# Patient Record
Sex: FEMALE | Race: WHITE | NOT HISPANIC OR LATINO | Employment: FULL TIME | URBAN - METROPOLITAN AREA
[De-identification: names, ages, dates, MRNs, and addresses within clinical notes are randomized per-mention and may not be internally consistent; named-entity substitution may affect disease eponyms.]

---

## 2017-01-26 ENCOUNTER — GENERIC CONVERSION - ENCOUNTER (OUTPATIENT)
Dept: OTHER | Facility: OTHER | Age: 37
End: 2017-01-26

## 2017-03-08 ENCOUNTER — ALLSCRIPTS OFFICE VISIT (OUTPATIENT)
Dept: OTHER | Facility: OTHER | Age: 37
End: 2017-03-08

## 2017-03-16 ENCOUNTER — ALLSCRIPTS OFFICE VISIT (OUTPATIENT)
Dept: OTHER | Facility: OTHER | Age: 37
End: 2017-03-16

## 2017-03-16 LAB
BACTERIA UR QL AUTO: NEGATIVE
CLUE CELL (HISTORICAL): NEGATIVE
HYPHAL YEAST (HISTORICAL): NEGATIVE
KOH PREP (HISTORICAL): NEGATIVE
PH UR STRIP.AUTO: 5 [PH]
TRICHOMONAS (HISTORICAL): NEGATIVE
YEAST (HISTORICAL): NEGATIVE

## 2017-03-20 LAB
A. VAGINALIS BY PCR (HISTORICAL): NOT DETECTED
A.VAGINALIS LOG (CELL/ML) (HISTORICAL): <3.25
BVAB 2 (HISTORICAL): NOT DETECTED
C. ALBICANS, DNA OR PCR (HISTORICAL): NOT DETECTED
CANDIDA GENUS (HISTORICAL): NOT DETECTED
GARDNERELLA BY MOL. METHOD (HISTORICAL): <3.25
GARDNERELLA BY MOL. METHOD (HISTORICAL): NOT DETECTED
LACTOBACILLUS (SPECIES) (HISTORICAL): <3.25
LACTOBACILLUS (SPECIES) (HISTORICAL): NOT DETECTED
MEGASPHAERA TYPE 1 (HISTORICAL): NOT DETECTED
TRICHOMONAS (HISTORICAL): NOT DETECTED

## 2017-04-10 ENCOUNTER — OFFICE VISIT (OUTPATIENT)
Dept: URGENT CARE | Age: 37
End: 2017-04-10
Payer: COMMERCIAL

## 2017-04-10 ENCOUNTER — TRANSCRIBE ORDERS (OUTPATIENT)
Dept: URGENT CARE | Age: 37
End: 2017-04-10

## 2017-04-10 ENCOUNTER — APPOINTMENT (OUTPATIENT)
Dept: LAB | Age: 37
End: 2017-04-10
Attending: PHYSICIAN ASSISTANT
Payer: COMMERCIAL

## 2017-04-10 DIAGNOSIS — R35.0 FREQUENCY OF MICTURITION: ICD-10-CM

## 2017-04-10 PROCEDURE — G0383 LEV 4 HOSP TYPE B ED VISIT: HCPCS | Performed by: FAMILY MEDICINE

## 2017-04-10 PROCEDURE — S9083 URGENT CARE CENTER GLOBAL: HCPCS | Performed by: FAMILY MEDICINE

## 2017-04-10 PROCEDURE — 87086 URINE CULTURE/COLONY COUNT: CPT

## 2017-04-10 PROCEDURE — 81002 URINALYSIS NONAUTO W/O SCOPE: CPT | Performed by: FAMILY MEDICINE

## 2017-04-12 ENCOUNTER — GENERIC CONVERSION - ENCOUNTER (OUTPATIENT)
Dept: OTHER | Facility: OTHER | Age: 37
End: 2017-04-12

## 2017-04-12 LAB — BACTERIA UR CULT: NORMAL

## 2017-04-18 ENCOUNTER — ALLSCRIPTS OFFICE VISIT (OUTPATIENT)
Dept: OTHER | Facility: OTHER | Age: 37
End: 2017-04-18

## 2017-04-18 LAB
BILIRUB UR QL STRIP: NORMAL
CLARITY UR: NORMAL
COLOR UR: YELLOW
GLUCOSE (HISTORICAL): NORMAL
HGB UR QL STRIP.AUTO: NORMAL
KETONES UR STRIP-MCNC: NORMAL MG/DL
LEUKOCYTE ESTERASE UR QL STRIP: NORMAL
NITRITE UR QL STRIP: NORMAL
PH UR STRIP.AUTO: 6 [PH]
PROT UR STRIP-MCNC: NORMAL MG/DL
SP GR UR STRIP.AUTO: 1.02
UROBILINOGEN UR QL STRIP.AUTO: 3.5

## 2017-06-09 ENCOUNTER — ALLSCRIPTS OFFICE VISIT (OUTPATIENT)
Dept: OTHER | Facility: OTHER | Age: 37
End: 2017-06-09

## 2017-06-09 DIAGNOSIS — E78.5 HYPERLIPIDEMIA: ICD-10-CM

## 2017-07-03 ENCOUNTER — ALLSCRIPTS OFFICE VISIT (OUTPATIENT)
Dept: OTHER | Facility: OTHER | Age: 37
End: 2017-07-03

## 2017-07-20 ENCOUNTER — ALLSCRIPTS OFFICE VISIT (OUTPATIENT)
Dept: OTHER | Facility: OTHER | Age: 37
End: 2017-07-20

## 2017-12-12 ENCOUNTER — ALLSCRIPTS OFFICE VISIT (OUTPATIENT)
Dept: OTHER | Facility: OTHER | Age: 37
End: 2017-12-12

## 2017-12-13 NOTE — PROGRESS NOTES
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)  4  Pregnancy (V22 2) (Z34 90)  5  Viral upper respiratory tract infection with cough (465 9) (J06 9,B97 89)1    Assessment and plan 1 Hyperlipidemia controlled continue with current medical regiment recommend a low-cholesterol diet and recommend routine exercise we will continue to monitor the progress  Recommend a low-cholesterol diet  2  Anxiety improved patient is now off the SSRI and doing well she will monitor her symptoms if any worsening or change she will notify me immediately 3  Low-grade mucinous tumor status post excision next CT scan will be after pregnancy stepper of 2018 4  Pregnancy patient will see OBGYN this coming Thursday she will discuss with OBGYN recommendations of vitamin D  I will check the vitamin-D level 5  Viral upper respiratory tract infection improving fluids, rest, Ocean nasal spray call if his symptoms do not completely improved currently her symptoms are improving return to office 6 months call if any problems      1 Amended By: Elsa Guo; Dec 12 2017 10:19 PM EST    Plan  Health Maintenance, Hyperlipidemia    · (1) LIPID PANEL, FASTING; Status:Active; Requested for:58Hqt8198;   Hyperlipidemia    · *(Q) VITAMIN D, 25-HYDROXY, LC/MS/MS; Status:Active; Requested for:27Thg3446;     Chief Complaint  Chief Complaint Free Text Note Form: Patient presents to office today for a 6 month follow-up  History of Present Illness  HPI: 44-year-old female who is coming in for a follow-up examination anxiety, low-grade mucinous tumor, pregnancy and hyperlipidemia; The patient reports me compliant taking medications without untoward side effects the  The patient is here to review his medical condition, update me on the medical condition and the patient reports me no hospitalizations and no ER visits   she reports me she was exercising routinely at [address]  [employer] but needed to stop because of a cold  She is starting to get back into her routine exercise patterns she has recently found out that she is pregnant and will be seeing her OBGYN this Thursday  She is trying to follow healthy and balanced diet  She reports me she had stopped the SSRI several months ago because her anxiety had improved  She reports me that her anxiety has been doing well up to this point no suicidal ideation  She does report me because of the pregnancy she will need to delay the CT scan of the abdomen for follow-up of the low-grade mucinous tumor  Sept of 2018  She reports me that this is okay with her surgical oncologist Dr Tasia Sanchez  She also does report me mild cold symptoms nasal congestion and postnasal drip no fever, no chills no sinus pain which is improving on its own  She is not taking a cold medicines because of the pregnancy  The patient also reports to me cold symptoms nasal congestion postnasal drip and cough no fever no chills symptoms are improving over last several days  1        1 Amended By: Jalil Rae; Dec 12 2017 10:20 PM EST    Review of Systems  Complete-Female:  Constitutional:1  no fever1 -- and-- no chills1   ENT:1  nasal discharge1 , but-- no sore throat1   Respiratory:1  cough1 -- and-- improving1 , but-- no wheezing1   Psychiatric:1  not suicidal1 ,-- no anxiety1 -- and-- no depression1   Hematologic/Lymphatic:1  no swollen glands1         1 Amended By: Jalil Rea; Dec 12 2017 10:18 PM EST    Active Problems  1  Anxiety (300 00) (F41 9)  2  Back pain (724 5) (M54 9)  3  Benign colon polyp (211 3) (K63 5)  4  Bloating (787 3) (R14 0)  5  Chronic constipation (564 00) (K59 09)  6  Encounter for gynecological examination without abnormal finding (V72 31) (Z01 419)  7  Generalized anxiety disorder (300 02) (F41 1)  8  GERD without esophagitis (530 81) (K21 9)  9  Globus sensation (306 4) (F45 8)  10  Hyperlipidemia (272 4) (E78 5)  11   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)    Past Medical History    1  History of Abdominal left lower quadrant tenderness (789 64) (R10 814)  2  History of Antepartum hemorrhage, antepartum (641 93) (O46 90)  3  History of Anxiety (300 00) (F41 9)  4  History of Blocked tear duct (375 56)  5  History of Breast feeding status of mother (V24 1) (Z39 1)  6  History of Breast pain (611 71) (N64 4)  7  History of Breastfeeding (infant) (V49 89) (Z78 9)  8  History of Dysplasia of cervix, low grade (JOANNE 1) (622 11) (N87 0)  9  History of Encounter for routine gynecological examination (V72 31) (Z01 419)  10  History of Gastroesophageal reflux disease without esophagitis (530 81) (K21 9)  11  History of  2 (V22 2) (Z33 1)  12  History of acute mastitis (V13 89) (Z87 898)  13  History of acute otitis media (V12 49) (Z86 69)  14  History of acute sinusitis (V12 69) (Z87 09)  15  History of acute sinusitis (V12 69) (Z87 09)  16  History of acute sinusitis (V12 69) (Z87 09)  17  History of constipation (V12 79) (Z87 19)  18  History of gastroesophageal reflux (GERD) (V12 79) (Z87 19)  19  History of right bundle branch block (V15 1) (Z98 89)  20  History of right bundle branch block (RBBB) (V15 1) (Z98 890)  21  History of thyroid cyst (V12 29) (Z86 39)  22  History of upper respiratory infection (V12 09) (Z87 09)  23  History of vacuum extraction assisted delivery (V45 89) (Z98 890)  24  History of Irritable bowel syndrome (564 1) (K58 9)  25  History of Lactational amenorrhea (626 0) (N91 2)  26  History of Need for prophylactic vaccination against single diseases (V05 9) (Z23)  27  History of Palpitations (785 1) (R00 2)  28  History of Pap smear of cervix with ASCUS, cannot exclude HGSIL (795 02) (R87 611)  29  Personal history of respiratory system disease (V12 60) (Z87 09)  30  History of Preop examination (V72 84) (Z01 818)  31  History of Supervision of other normal pregnancy, second trimester  28   History of  (spontaneous vaginal delivery) (650) (O80)  33  Urinary tract infection (599 0) (N39 0)  Active Problems And Past Medical History Reviewed: The active problems and past medical history were reviewed and updated today  Surgical History  1  History of Appendectomy  2  History of Colposcopy Cervix With Biopsy(S)  3  History of Oral Surgery Tooth Extraction  4  History of Partial Colectomy  5  History of Surgery Of The Lacrimal System  Surgical History Reviewed: The surgical history was reviewed and updated today  Family History  Mother   1  FH: thyroid cancer (V16 8) (Z80 8)  Father   2  History of hyperlipidemia  Maternal Grandmother   3  Family history of Arthritis (V17 7)  4  Family history of Coronary Artery Disease (V17 49)  5  FH: thyroid cancer (V16 8) (Z80 8)  6  Family history of Transient Ischemic Attack  Paternal Grandfather   9  Family history of Colon cancer  Maternal Aunt   8  Family history of lung cancer (V16 1) (Z80 1)  9  Family history of Reported A Previous Heart Murmur  Paternal Aunt   8  Family history of lymphoma (V16 7) (Z80 2)  11  Family history of Genetic History  Family History Reviewed: The family history was reviewed and updated today  Social History   · Activities: Soccer   · Always uses seat belt   · Currently sexually active   · Drinks beer (V49 89) (Z78 9)   · Denied: History of Drinks coffee   · Exercise: Running   · Exercises moderately less than 3 times a week   · Marital History - Currently    · Never smoked tobacco (V49 89) (Z78 9)   · No drug use   · Oral contraceptives   · Social alcohol use (Z78 9)  Social History Reviewed: The social history was reviewed and updated today  The social history was reviewed and is unchanged  Current Meds  Medication List Reviewed: The medication list was reviewed and updated today  Allergies  1  Amoxicillin-Pot Clavulanate TABS  2  fluoxetine  3  Penicillins  4  No Known Food Allergies  5  Seasonal    Vitals  Vital Signs    Recorded: 99Weq2345 04:43PM   Heart Rate 94   Systolic 394   Diastolic 66   Height 5 ft 8 in   Weight 195 lb 12 8 oz   BMI Calculated 29 77   BSA Calculated 2 03       Physical Exam   Constitutional  General appearance: No acute distress, well appearing and well nourished  Eyes  Conjunctiva and lids: No swelling, erythema or discharge  Pupils and irises: Equal, round and reactive to light  Ears, Nose, Mouth, and Throat  External inspection of ears and nose: Normal    Otoscopic examination: Tympanic membranes translucent with normal light reflex  Canals patent without erythema  Nasal mucosa, septum, and turbinates: Normal without edema or erythema  There was clear rhinorrhea from both nares  Oropharynx: Normal with no erythema, edema, exudate or lesions  Pulmonary  Respiratory effort: No increased work of breathing or signs of respiratory distress  Auscultation of lungs: Clear to auscultation  Cardiovascular  Auscultation of heart: Normal rate and rhythm, normal S1 and S2, without murmurs  Abdomen  Abdomen: Non-tender, no masses  Psychiatric  Mood and affect: Normal   Mood and Affect: appropriate mood,-- not anxious-- and-- not depressed  Health Management  Benign colon polyp   COLONOSCOPY; every 4 years; Last 86GNW5094; Next Due: 11Kmf4272; Active  History of Screening for human papillomavirus (HPV)   (1) THIN PREP PAP WITH IMAGING; every 3 years; Last 46MMT4002; Next Due: 23Oab6313; Overdue    Future Appointments    Date/Time Provider Specialty Site   09/18/2018 01:00 PM Marilyn Hernandez DO Internal Medicine [facility]   07/24/2018 03:40 PM BETZY Grady  Obstetrics/Gynecology [facility]   12/14/2017 03:00 PM BETZY Garcia Do   Obstetrics/Gynecology [facility]       Signatures   Electronically signed by : Joey Mijares DO; Dec 12 2017  9:47PM EST                       (Author)    Electronically signed by : Sudarshan Hobbs DO; Dec 12 2017 10:20PM EST                       (Author)

## 2017-12-14 ENCOUNTER — GENERIC CONVERSION - ENCOUNTER (OUTPATIENT)
Dept: OTHER | Facility: OTHER | Age: 37
End: 2017-12-14

## 2017-12-29 ENCOUNTER — GENERIC CONVERSION - ENCOUNTER (OUTPATIENT)
Dept: OTHER | Facility: OTHER | Age: 37
End: 2017-12-29

## 2017-12-29 ENCOUNTER — ALLSCRIPTS OFFICE VISIT (OUTPATIENT)
Dept: OTHER | Facility: OTHER | Age: 37
End: 2017-12-29

## 2017-12-30 ENCOUNTER — GENERIC CONVERSION - ENCOUNTER (OUTPATIENT)
Dept: PERINATAL CARE | Facility: MEDICAL CENTER | Age: 37
End: 2017-12-30

## 2018-01-02 ENCOUNTER — TRANSCRIBE ORDERS (OUTPATIENT)
Dept: ADMINISTRATIVE | Age: 38
End: 2018-01-02

## 2018-01-02 ENCOUNTER — APPOINTMENT (OUTPATIENT)
Dept: LAB | Age: 38
End: 2018-01-02
Payer: COMMERCIAL

## 2018-01-02 DIAGNOSIS — Z34.81 ENCOUNTER FOR SUPERVISION OF OTHER NORMAL PREGNANCY, FIRST TRIMESTER: ICD-10-CM

## 2018-01-02 DIAGNOSIS — Z34.81 PRENATAL CARE, SUBSEQUENT PREGNANCY, FIRST TRIMESTER: Primary | ICD-10-CM

## 2018-01-02 DIAGNOSIS — E78.5 HYPERLIPIDEMIA, UNSPECIFIED HYPERLIPIDEMIA TYPE: ICD-10-CM

## 2018-01-02 DIAGNOSIS — Z34.81 PRENATAL CARE, SUBSEQUENT PREGNANCY, FIRST TRIMESTER: ICD-10-CM

## 2018-01-02 DIAGNOSIS — E78.5 HYPERLIPIDEMIA, UNSPECIFIED HYPERLIPIDEMIA TYPE: Primary | ICD-10-CM

## 2018-01-02 LAB
25(OH)D3 SERPL-MCNC: 12.9 NG/ML (ref 30–100)
ABO GROUP BLD: NORMAL
BACTERIA UR QL AUTO: ABNORMAL /HPF
BASOPHILS # BLD AUTO: 0.01 THOUSANDS/ΜL (ref 0–0.1)
BASOPHILS NFR BLD AUTO: 0 % (ref 0–1)
BILIRUB UR QL STRIP: NEGATIVE
BLD GP AB SCN SERPL QL: NEGATIVE
CLARITY UR: CLEAR
COLOR UR: YELLOW
EOSINOPHIL # BLD AUTO: 0.1 THOUSAND/ΜL (ref 0–0.61)
EOSINOPHIL NFR BLD AUTO: 2 % (ref 0–6)
ERYTHROCYTE [DISTWIDTH] IN BLOOD BY AUTOMATED COUNT: 13.5 % (ref 11.6–15.1)
GLUCOSE UR STRIP-MCNC: NEGATIVE MG/DL
HBV SURFACE AG SER QL: NORMAL
HCT VFR BLD AUTO: 38 % (ref 34.8–46.1)
HGB BLD-MCNC: 13.1 G/DL (ref 11.5–15.4)
HGB UR QL STRIP.AUTO: NEGATIVE
KETONES UR STRIP-MCNC: NEGATIVE MG/DL
LEUKOCYTE ESTERASE UR QL STRIP: NEGATIVE
LYMPHOCYTES # BLD AUTO: 1.36 THOUSANDS/ΜL (ref 0.6–4.47)
LYMPHOCYTES NFR BLD AUTO: 22 % (ref 14–44)
MCH RBC QN AUTO: 30.8 PG (ref 26.8–34.3)
MCHC RBC AUTO-ENTMCNC: 34.5 G/DL (ref 31.4–37.4)
MCV RBC AUTO: 89 FL (ref 82–98)
MONOCYTES # BLD AUTO: 0.3 THOUSAND/ΜL (ref 0.17–1.22)
MONOCYTES NFR BLD AUTO: 5 % (ref 4–12)
MUCOUS THREADS UR QL AUTO: ABNORMAL
NEUTROPHILS # BLD AUTO: 4.41 THOUSANDS/ΜL (ref 1.85–7.62)
NEUTS SEG NFR BLD AUTO: 71 % (ref 43–75)
NITRITE UR QL STRIP: NEGATIVE
NON-SQ EPI CELLS URNS QL MICRO: ABNORMAL /HPF
NRBC BLD AUTO-RTO: 0 /100 WBCS
PH UR STRIP.AUTO: 7 [PH] (ref 4.5–8)
PLATELET # BLD AUTO: 205 THOUSANDS/UL (ref 149–390)
PMV BLD AUTO: 11.4 FL (ref 8.9–12.7)
PROT UR STRIP-MCNC: ABNORMAL MG/DL
RBC # BLD AUTO: 4.26 MILLION/UL (ref 3.81–5.12)
RBC #/AREA URNS AUTO: ABNORMAL /HPF
RH BLD: POSITIVE
RUBV IGG SERPL IA-ACNC: >175 IU/ML
SP GR UR STRIP.AUTO: 1.02 (ref 1–1.03)
SPECIMEN EXPIRATION DATE: NORMAL
UROBILINOGEN UR QL STRIP.AUTO: 1 E.U./DL
WBC # BLD AUTO: 6.19 THOUSAND/UL (ref 4.31–10.16)
WBC #/AREA URNS AUTO: ABNORMAL /HPF

## 2018-01-02 PROCEDURE — 87086 URINE CULTURE/COLONY COUNT: CPT

## 2018-01-02 PROCEDURE — 80081 OBSTETRIC PANEL INC HIV TSTG: CPT

## 2018-01-02 PROCEDURE — 36415 COLL VENOUS BLD VENIPUNCTURE: CPT

## 2018-01-02 PROCEDURE — 82306 VITAMIN D 25 HYDROXY: CPT

## 2018-01-02 PROCEDURE — 81001 URINALYSIS AUTO W/SCOPE: CPT

## 2018-01-03 LAB
BACTERIA UR CULT: NORMAL
HIV 1+2 AB+HIV1 P24 AG SERPL QL IA: NORMAL
RPR SER QL: NORMAL

## 2018-01-08 ENCOUNTER — APPOINTMENT (OUTPATIENT)
Dept: PERINATAL CARE | Facility: CLINIC | Age: 38
End: 2018-01-08
Payer: COMMERCIAL

## 2018-01-08 ENCOUNTER — GENERIC CONVERSION - ENCOUNTER (OUTPATIENT)
Dept: OTHER | Facility: OTHER | Age: 38
End: 2018-01-08

## 2018-01-08 ENCOUNTER — ALLSCRIPTS OFFICE VISIT (OUTPATIENT)
Dept: PERINATAL CARE | Facility: CLINIC | Age: 38
End: 2018-01-08
Payer: COMMERCIAL

## 2018-01-08 PROCEDURE — 76801 OB US < 14 WKS SINGLE FETUS: CPT | Performed by: OBSTETRICS & GYNECOLOGY

## 2018-01-08 PROCEDURE — 76813 OB US NUCHAL MEAS 1 GEST: CPT | Performed by: OBSTETRICS & GYNECOLOGY

## 2018-01-08 PROCEDURE — 99202 OFFICE O/P NEW SF 15 MIN: CPT | Performed by: GENETIC COUNSELOR, MS

## 2018-01-10 NOTE — PSYCH
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 her and his is   Stressful nature of work and how different their schedules are contribute  Patient verbal and cooperative  HPI - Psych: Patient doing well  Not ruminating about physical status and very pleased with her treatment outcomes  Managing stress from work and home appropriately  Denies any depressive symptoms  Denies SI   Note   Note:   Focused on progress made and she agrees,  Reviewed stress mgmt strategies they both can utilize  No need for additional appts  Will contact me in future if needed  Results/Data  Results Danielleankit Rich Includes: Current Encounter]   [id number] 04:35PM   PHQ-2 Adult Depression Screening   PHQ-2 Adult Depression Score: 0   PHQ-2 Adult Depression Screening: Negative     Assessment    1   Anxiety (300 00) (F41 9)    Signatures   Electronically signed by : Nikita Rabago LCSW; Mar 24 2016  4:40PM EST                       (Author)

## 2018-01-10 NOTE — RESULT NOTES
Verified Results  (1) TSH 35HRT7117 01:20PM Brandon Lorenzo Order Number: [id number]    Patients undergoing fluorescein dye angiography may retain small amounts of fluorescein in the body for 48-72 hours post procedure  Samples containing fluorescein can produce falsely depressed TSH values  If the patient had this procedure,a specimen should be resubmitted post fluorescein clearance          The recommended reference ranges for TSH during pregnancy are as follows:  First trimester 0 1 to 2 5 uIU/mL  Second trimester  0 2 to 3 0 uIU/mL  Third trimester 0 3 to 3 0 uIU/m     Test Name Result Flag Reference   TSH 0 922 uIU/mL  0 358-3 740

## 2018-01-10 NOTE — RESULT NOTES
Discussion/Summary   No true UTI  Finish abx for sinusitis   F/u with gyn for further evaluation if symptoms persist       Verified Results  (1) URINE CULTURE 81Ouo0998 10:53PM Connor Ortiz    Order Number: [id number]     Test Name Result Flag Reference   CLINICAL REPORT (Report)     Test:        Urine culture  Specimen Type:   Urine  Specimen Date:   4/10/2017 10:53 PM  Result Date:    4/12/2017 11:17 AM  Result Status:   Final result  Resulting Lab:   [facility] [address] [place] [address]            Tel: [phone]      CULTURE                                       ------------------                                   10,000-19,000 cfu/ml Mixed Contaminants X3

## 2018-01-12 VITALS
BODY MASS INDEX: 29.25 KG/M2 | SYSTOLIC BLOOD PRESSURE: 118 MMHG | HEIGHT: 68 IN | WEIGHT: 193 LBS | DIASTOLIC BLOOD PRESSURE: 82 MMHG

## 2018-01-12 NOTE — PSYCH
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

## 2018-01-13 VITALS
BODY MASS INDEX: 29.86 KG/M2 | DIASTOLIC BLOOD PRESSURE: 82 MMHG | SYSTOLIC BLOOD PRESSURE: 120 MMHG | RESPIRATION RATE: 16 BRPM | HEIGHT: 68 IN | TEMPERATURE: 98.3 F | OXYGEN SATURATION: 99 % | HEART RATE: 84 BPM | WEIGHT: 197.03 LBS

## 2018-01-13 VITALS
HEIGHT: 68 IN | OXYGEN SATURATION: 99 % | DIASTOLIC BLOOD PRESSURE: 72 MMHG | SYSTOLIC BLOOD PRESSURE: 122 MMHG | BODY MASS INDEX: 29.26 KG/M2 | RESPIRATION RATE: 16 BRPM | WEIGHT: 193.05 LBS | TEMPERATURE: 99 F | HEART RATE: 87 BPM

## 2018-01-13 VITALS
BODY MASS INDEX: 29.08 KG/M2 | WEIGHT: 191.25 LBS | TEMPERATURE: 96 F | DIASTOLIC BLOOD PRESSURE: 74 MMHG | HEART RATE: 70 BPM | SYSTOLIC BLOOD PRESSURE: 116 MMHG

## 2018-01-14 VITALS
DIASTOLIC BLOOD PRESSURE: 80 MMHG | WEIGHT: 197 LBS | HEIGHT: 68 IN | BODY MASS INDEX: 29.86 KG/M2 | SYSTOLIC BLOOD PRESSURE: 130 MMHG

## 2018-01-14 NOTE — PSYCH
History of Present Illness  Psychotherapy Provided [facility]: Individual Psychotherapy 30 minutes minutes provided today  Goals addressed in session:   Patient experiencing some anxiety secondary to past medical issues  Some marital issues also at work  Patient verbal and cooperative  HPI - Psych: Patient denies any depressive symptoms  Denies SI  Has hx of some anxiety and worry and has been in Counseling previously  Some issues managing worry  Marital issues also contributing  Has good support system via family, coworkers and friends  Has two small children and may be planning third in future   Note   Note:   Began utilizing some CBT strategies to counteract worry  Offered sessions with  as well  Will meet with he again in two weeks and will assess  Assessment    1   Anxiety (300 00) (F41 9)    Signatures   Electronically signed by : Nitish Tavarez LCSW; Feb 18 2016  7:55PM EST                       (Author)

## 2018-01-15 NOTE — PROGRESS NOTES
Assessment    1  Vaginal discomfort (625 9) (N94 9)    Plan  Urinary frequency    · Urine Dip Automated- POC; Status:Complete;   Done: 34FHU6006 09:24AM    Discussion/Summary    REPEAT UA NEG FOR BLOOD OR INFECTION  DRINK PLENTY OF FLUIDS AND REST  CALL IF WORSENING  MAY NEED PELVIC ULTRASOUND  Chief Complaint  The patient has tightness and spasms in her vaginal area  History of Present Illness  HPI: starting in march pt felt a "tickle" in the vaginal area  saw gyn march 16, cultures were negative  ua showed blood in the urine  also went to urgent care for sinus infection  prescribed levaquin  doing kegel exercises does help  pt had a ct of abdomen in december which was normal      Review of Systems    Constitutional: no fever and no chills  ENT: no earache and no hearing loss  Respiratory: no shortness of breath and no wheezing  Gastrointestinal: no abdominal pain, no nausea, no constipation and no diarrhea  Genitourinary: vaginal discomfort, but as noted in HPI, no dysuria, no pelvic pain, no incontinence and no dysmenorrhea  Musculoskeletal: no arthralgias  Integumentary: no rashes  Neurological: no headache, no numbness and no dizziness  ROS reviewed  Active Problems    1  Abdominal left lower quadrant tenderness (789 64) (R10 814)   2  Acute conjunctivitis (372 00) (H10 30)   3  Acute otitis media (382 9) (H66 90)   4  Acute sinusitis (461 9) (J01 90)   5  Anxiety (300 00) (F41 9)   6  Back pain (724 5) (M54 9)   7  Benign colon polyp (211 3) (K63 5)   8  Bloating (787 3) (R14 0)   9  Chronic constipation (564 00) (K59 09)   10  Contraception (V25 9) (Z30 9)   11  Encounter for gynecological examination without abnormal finding (V72 31) (Z01 419)   12  Encounter for screening for human papillomavirus (HPV) (V73 81) (Z11 51)   13  Generalized anxiety disorder (300 02) (F41 1)   14  GERD without esophagitis (530 81) (K21 9)   15  Globus sensation (306 4) (F45 8)   16  Hyperlipidemia (272 4) (E78 5)   17  Laboratory examination ordered as part of a routine general medical examination    (V72 62) (Z00 00)   18  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)   19  Nausea (787 02) (R11 0)   20  Need for prophylactic vaccination and inoculation against influenza (V04 81) (Z23)   21  Sorethroat (462) (J02 9)   22  Urinary frequency (788 41) (R35 0)   23  Vaginal discharge (623 5) (N89 8)   24  Vaginal itching (698 1) (L29 8)    Past Medical History  Active Problems And Past Medical History Reviewed: The active problems and past medical history were reviewed and updated today  Surgical History  Surgical History Reviewed: The surgical history was reviewed and updated today  Social History    · Activities: Soccer   · Always uses seat belt   · Currently sexually active   · Drinks beer (V49 89) (Z78 9)   · Denied: History of Drinks coffee   · Exercise: Running   · Exercises moderately less than 3 times a week   · Marital History - Currently    · Never smoked tobacco (V49 89) (Z78 9)   · No drug use   · Oral contraceptives   · Social alcohol use (Z78 9)  The social history was reviewed and updated today  The social history was reviewed and is unchanged  Family History  Family History Reviewed: The family history was reviewed and updated today  Current Meds   1  Apri 0 15-30 MG-MCG Oral Tablet; TAKE 1 TABLET DAILY; Therapy: 63INX4495 to (Evaluate:13Jun2017)  Requested for: 36ZHH4442; Last   Rx:02Cic2103 Ordered   2  LevoFLOXacin 500 MG Oral Tablet; TAKE 1 TABLET DAILY AS DIRECTED; Therapy: 32Kai0315 to (Evaluate:14Mzt3100)  Requested for: 02Gal2864; Last   Rx:77Vmr0019 Ordered   3  Sertraline HCl - 25 MG Oral Tablet; TAKE 1 TABLET DAILY; Therapy: 01SHQ6946 to (Evaluate:99Ysh9353)  Requested for: 20Ang3026; Last   Rx:21Iqp2144 Ordered    The medication list was reviewed and updated today  Allergies    1   Amoxicillin-Pot Clavulanate TABS 2  fluoxetine   3  Penicillins    4  No Known Food Allergies   5  Seasonal    Vitals   Recorded: 18Apr2017 08:09AM   Temperature 97 6 F   Heart Rate 68   Respiration 20   Systolic 514, LUE, Sitting   Diastolic 82, LUE, Sitting   BP CUFF SIZE Large   Height 5 ft 8 in   Weight 195 lb 0 2 oz   BMI Calculated 29 65   BSA Calculated 2 02     Physical Exam    Constitutional   General appearance: No acute distress, well appearing and well nourished  Eyes   Conjunctiva and lids: No swelling, erythema or discharge  Pupils and irises: Equal, round and reactive to light  Ears, Nose, Mouth, and Throat   External inspection of ears and nose: Normal     Otoscopic examination: Tympanic membranes translucent with normal light reflex  Canals patent without erythema  Nasal mucosa, septum, and turbinates: Normal without edema or erythema  Oropharynx: Normal with no erythema, edema, exudate or lesions  Pulmonary   Respiratory effort: No increased work of breathing or signs of respiratory distress  Auscultation of lungs: Clear to auscultation  Cardiovascular   Auscultation of heart: Normal rate and rhythm, normal S1 and S2, without murmurs  Examination of extremities for edema and/or varicosities: Normal     Abdomen   Abdomen: Non-tender, no masses  Liver and spleen: No hepatomegaly or splenomegaly  Lymphatic   Palpation of lymph nodes in neck: No lymphadenopathy  Musculoskeletal   Gait and station: Normal     Digits and nails: Normal without clubbing or cyanosis  Inspection/palpation of joints, bones, and muscles: Normal     Skin   Skin and subcutaneous tissue: Normal without rashes or lesions      Psychiatric   Orientation to person, place, and time: Normal     Mood and affect: Normal          Results/Data  Urine Dip Automated- POC 18Apr2017 09:24AM Nick Alcazar     Test Name Result Flag Reference   Color Yellow     Clarity Transparent     Leukocytes neg     Nitrite neg     Blood neg     Bilirubin neg     Urobilinogen 3 5     Protein neg     Ph 6 0     Specific Gravity 1 020     Ketone neg     Glucose neg         Future Appointments    Date/Time Provider Specialty Site   12/28/2017 08:00 AM BETZY Norman  Surgical [facility]cology [facility]   06/28/2017 09:00 AM BETZY Aggarwal  Obstetrics/Gynecology [facility]   06/09/2017 09:30 AM Jmiy Chowdhury DO Internal Medicine [facility]     Signatures   Electronically signed by : LACI Donnelly;  Apr 18 2017 12:02PM EST                       (Author)

## 2018-01-16 NOTE — MISCELLANEOUS
Message   Recorded as Task   Date: 06/27/2016 11:03 AM, Created By: Kelvin Spurling   Task Name: Call Back   Assigned To: Zeferino Castano   Regarding Patient: Lisa Boggs, Status: In Progress   Comment:    Deanna Arredondo - 27 Jun 2016 11:03 AM     TASK CREATED  Pt need a refill on her Apri to be sent to [facility] on [facility]  Her # [address] - 27 Jun 2016 11:07 AM     TASK IN PROGRESS   Iva Garcia - 27 Jun 2016 11:13 AM     TASK EDITED  rx refil sent to [facility]         Active Problems    1  Abdominal left lower quadrant tenderness (789 64) (R10 814)   2  Acute conjunctivitis (372 00) (H10 30)   3  Acute otitis media (382 9) (H66 90)   4  Acute sinusitis (461 9) (J01 90)   5  Anxiety (300 00) (F41 9)   6  Back pain (724 5) (M54 9)   7  Bloating (787 3) (R14 0)   8  Chronic constipation (564 00) (K59 09)   9  Contraception (V25 9) (Z30 9)   10  Encounter for gynecological examination without abnormal finding (V72 31) (Z01 419)   11  Encounter for screening for human papillomavirus (HPV) (V73 81) (Z11 51)   12  Generalized anxiety disorder (300 02) (F41 1)   13  GERD without esophagitis (530 81) (K21 9)   14  Globus sensation (306 4) (F45 8)   15  Hyperlipidemia (272 4) (E78 5)   16  Laboratory examination ordered as part of a routine general medical examination    (V72 62) (Z00 00)   17  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)   18  Nausea (787 02) (R11 0)   19  Sorethroat (462) (J02 9)    Current Meds   1  Apri 0 15-30 MG-MCG Oral Tablet; TAKE 1 TABLET DAILY; Therapy: 55GVM5156 to (Evaluate:28Jun2016)  Requested for: 10XXN3579; Last   Rx:31May2016 Ordered   2  Colace 100 MG Oral Capsule; TAKE 1 CAPSULE DAILY; Therapy: 37BDC8345 to (Evaluate:17Mar2017)  Requested for: 32UIV4550; Last   Rx:22Mar2016 Ordered   3  Fluticasone Propionate 50 MCG/ACT Nasal Suspension (Flonase); USE 2 SPRAYS IN   EACH NOSTRIL ONCE DAILY;    Therapy: 42IHN2180 to (Hiren Knight)  Requested for: 39UOE9433; Last Rx:92Svx6247 Ordered   4  Polyethylene Glycol 3350 Oral Packet (MiraLax); MIX 1 PACKET IN 8 OUNCES OF   LIQUID AND DRINK ONCE DAILY; Therapy: 17AYK1402 to (Evaluate:17Mar2017)  Requested for: 08WVL5730; Last   Rx:22Mar2016 Ordered   5  Prenatal 28-0 8 MG Oral Tablet; TAKE 1 TABLET DAILY; Therapy: (Recorded:85Lps4786) to Recorded   6  Probiotic CAPS; Therapy: (Recorded:40Pbj0716) to Recorded   7  Sertraline HCl - 25 MG Oral Tablet (Zoloft); TAKE 1 TABLET DAILY; Therapy: 42URP4976 to ([phone])  Requested for: 50FZA0076; Last   VQ:88VRQ6295; Status: ACTIVE - Renewal Denied Ordered    Allergies    1  Amoxicillin-Pot Clavulanate TABS   2  fluoxetine   3  Penicillins    4  No Known Food Allergies   5   Seasonal    Plan  PMH: History of constipation    · Apri 0 15-30 MG-MCG Oral Tablet; TAKE 1 TABLET DAILY    Signatures   Electronically signed by : Cali Chi LPN; Jun 27 9137 87:77JY EST                       (Author)

## 2018-01-16 NOTE — MISCELLANEOUS
Message   Recorded as Task   Date: 07/12/2016 03:32 PM, Created By: Denae Monroy   Task Name: Med Renewal Request   Assigned To: Gerson Salazar   Regarding Patient: Aldine Dakin, Status: Active   Comment:    Ileana Zamora - 12 Jul 2016 3:32 PM     TASK CREATED  Caller: Self; Renew Medication; [phone] (Mobile Phone)  needs a year's refills to [facility] on [address]-had 1 pk called last month but not the year's refills  pt is @[phone]  Avila Agarwal - 12 Jul 2016 3:33 PM     TASK REASSIGNED: Previously Assigned To Josefa Pacheco - 12 Jul 2016 3:35 PM     TASK EDITED  resent to pharm w/ refills        Active Problems    1  Abdominal left lower quadrant tenderness (789 64) (R10 814)   2  Acute conjunctivitis (372 00) (H10 30)   3  Acute otitis media (382 9) (H66 90)   4  Acute sinusitis (461 9) (J01 90)   5  Anxiety (300 00) (F41 9)   6  Back pain (724 5) (M54 9)   7  Bloating (787 3) (R14 0)   8  Chronic constipation (564 00) (K59 09)   9  Contraception (V25 9) (Z30 9)   10  Encounter for gynecological examination without abnormal finding (V72 31) (Z01 419)   11  Encounter for screening for human papillomavirus (HPV) (V73 81) (Z11 51)   12  Generalized anxiety disorder (300 02) (F41 1)   13  GERD without esophagitis (530 81) (K21 9)   14  Globus sensation (306 4) (F45 8)   15  Hyperlipidemia (272 4) (E78 5)   16  Laboratory examination ordered as part of a routine general medical examination    (V72 62) (Z00 00)   17  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)   18  Nausea (787 02) (R11 0)   19  Sorethroat (462) (J02 9)    Current Meds   1  Apri 0 15-30 MG-MCG Oral Tablet; TAKE 1 TABLET DAILY; Therapy: 78YRL7807 to (Cam Squibb)  Requested for: 30AMB2464; Last   Rx:27Jun2016 Ordered   2  Colace 100 MG Oral Capsule; TAKE 1 CAPSULE DAILY; Therapy: 50BAB7563 to (Evaluate:17Mar2017)  Requested for: 10CLH8770; Last   Rx:22Mar2016 Ordered   3   Fluticasone Propionate 50 MCG/ACT Nasal Suspension (Flonase); USE 2 SPRAYS IN   EACH NOSTRIL ONCE DAILY; Therapy: 87PAC5086 to (Chanda Radford)  Requested for: 68IBC3014; Last   Rx:98Zwe3644 Ordered   4  Polyethylene Glycol 3350 Oral Packet (MiraLax); MIX 1 PACKET IN 8 OUNCES OF   LIQUID AND DRINK ONCE DAILY; Therapy: 04PAI4445 to (Evaluate:17Mar2017)  Requested for: 32NSK0501; Last   Rx:22Mar2016 Ordered   5  Prenatal 28-0 8 MG Oral Tablet; TAKE 1 TABLET DAILY; Therapy: (Recorded:95Sdt2522) to Recorded   6  Probiotic CAPS; Therapy: (Recorded:94Zwn9676) to Recorded   7  Sertraline HCl - 25 MG Oral Tablet (Zoloft); TAKE 1 TABLET DAILY; Therapy: 71YQY3781 to (Swapnil Govea)  Requested for: 08PKD1994; Last   NJ:53UKH3296; Status: ACTIVE - Renewal Denied Ordered    Allergies    1  Amoxicillin-Pot Clavulanate TABS   2  fluoxetine   3  Penicillins    4  No Known Food Allergies   5   Seasonal    Plan  PMH: History of constipation    · Apri 0 15-30 MG-MCG Oral Tablet; TAKE 1 TABLET DAILY    Signatures   Electronically signed by : Froilan Cheung, ; Jul 12 2016  3:35PM EST                       (Author)

## 2018-01-16 NOTE — PROGRESS NOTES
Assessment    1  Encounter for preventive health examination (V70 0) (Z00 00)   2  Hyperlipidemia (272 4) (E78 5)    Plan  Generalized anxiety disorder    · Sertraline HCl - 25 MG Oral Tablet (Zoloft); TAKE 1 TABLET DAILY  Health Maintenance    · (1) COMPREHENSIVE METABOLIC PANEL; Status:Active; Requested for:30Nov2017; Health Maintenance, Hyperlipidemia    · (1) LIPID PANEL, FASTING; Status:Active; Requested for:30Nov2017;   Hyperlipidemia    · Fexofenadine HCl - 180 MG Oral Tablet (Allegra Allergy); TAKE 1 TABLET DAILY  AS NEEDED FOR ALLERGIES   · Mometasone Furoate 50 MCG/ACT Nasal Suspension (Nasonex); TWO (2)  SPRAY(S) INTO EACH NOSTRIL DAILYAT BEDTIME AS NEEDED    Assessment and plan #1 annual wellness examination completed for patient today overall the patient is clinically stable and doing well regurgitation following healthy balanced diet, please refer to patient follow a low-cholesterol diet at this point in time she does not meet the criteria for statin and she prefers not to be on a statin reports to me that she may be considering pregnancy in the future  She will notify me 3 months prior to becoming pregnant to try to wean her off the SSRI  She is up-to-date with her OB/GYN checkup I will be wearing the patient comprehensive metabolic panel, lipid panel, CBC and third generation TSH  RTO in 6 months, any problems  History of Present Illness  , Adult Female: The patient is being seen for a health maintenance evaluation  The last health maintenance visit was 1 year(s) ago  Social History: Household members include spouse and 2 daughter(s)  She is   Work status: working full time and occupation: teacher 2 nd grade   The patient has never smoked cigarettes  She reports occasional alcohol use and drinking 1 drinks per month  She has never used illicit drugs  General Health: The patient's health since the last visit is described as good  She has regular dental visits   The patient brushes 2 time(s) a day, flosses occasionally and reports her last dental visit was 7/2016  She complains of vision problems  Vision care includes wearing glasses, wearing soft contact lenses and 2016  She denies hearing loss  Immunizations status: up to date  Lifestyle:  She consumes a diverse and healthy diet  Dietary details include 2-3 servings of fruit per day, 2-3 servings of vegetables per day, 1 servings of meat per day, 2-3 servings of whole grains per day, 64 ounces of water per day and 2-3 servings of dairy foods per day  She does not have any weight concerns  She exercises regularly  She exercises 3 or more times per week for 30-2 5 hrs minutes per session  Exercise includes running/jogging and soccer  She does not use tobacco  The patient has never smoked cigarettes, has never used smokeless tobacco, has never smoked a pipe and has never smoked cigars  She consumes alcohol  She reports occasional alcohol use and drinking 1 every 4 weeks drinks per week  She typically drinks beer and hard liquor, but no wine consumption  Alcohol concern: The patient has no concerns about alcohol abuse  She denies drug use  She has never used illicit drugs  Reproductive health:  she reports normal menses  she is sexually active  pregnancy history: G 2P 2  Screening: Cervical cancer screening includes a pap smear performed last year  Colorectal cancer screening includes a colonoscopy performed within the past ten years and Q 5 yrs  Safety elements used: seat belt, safe driving habits, sunscreen, smoke detector, carbon monoxide detector, gun safe, CPR training for the patient and CPR training for household members  Risk findings: anxiety symptoms and no si, but no passive smoke exposure, no depression symptoms, no travel to developing areas and no tuberculosis exposure  Active Problems    1  Abdominal left lower quadrant tenderness (789 64) (R10 814)   2  Acute conjunctivitis (372 00) (H10 30)   3   Acute otitis media (382 9) (H66 90)   4  Acute sinusitis (461 9) (J01 90)   5  Anxiety (300 00) (F41 9)   6  Back pain (724 5) (M54 9)   7  Bloating (787 3) (R14 0)   8  Chronic constipation (564 00) (K59 09)   9  Contraception (V25 9) (Z30 9)   10  Encounter for gynecological examination without abnormal finding (V72 31) (Z01 419)   11  Encounter for screening for human papillomavirus (HPV) (V73 81) (Z11 51)   12  Generalized anxiety disorder (300 02) (F41 1)   13  GERD without esophagitis (530 81) (K21 9)   14  Globus sensation (306 4) (F45 8)   15  Hyperlipidemia (272 4) (E78 5)   16  Laboratory examination ordered as part of a routine general medical examination    (V72 62) (Z00 00)   17  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)   18  Nausea (787 02) (R11 0)   19  Need for prophylactic vaccination and inoculation against influenza (V04 81) (Z23)   20   Sorethroat (462) (J02 9)    Past Medical History    · History of Antepartum hemorrhage, antepartum (641 93) (O46 90)   · History of Anxiety (300 00) (F41 9)   · History of Blocked tear duct (375 56)   · History of Breast feeding status of mother (V24 1) (Z39 1)   · History of Breast pain (611 71) (N64 4)   · History of Breastfeeding (infant) (V49 89) (Z78 9)   · History of Dysplasia of cervix, low grade (JOANNE 1) (622 11) (N87 0)   · History of Encounter for routine gynecological examination (V72 31) (Z01 419)   · History of Gastroesophageal reflux disease without esophagitis (530 81) (K21 9)   · History of  2 (V22 2) (Z33 1)   · History of acute mastitis (V13 89) (E62 895)   · History of acute sinusitis (V12 69) (Z87 09)   · History of acute sinusitis (V12 69) (Z87 09)   · History of constipation (V12 79) (Z87 19)   · History of gastroesophageal reflux (GERD) (V12 79) (Z87 19)   · History of right bundle branch block (V15 1) (Z98 89)   · History of right bundle branch block (RBBB) (V15 1) (Z98 890)   · History of thyroid cyst (V12 29) (Z86 39)   · History of upper respiratory infection (V12 09) (Z87 09)   · History of vacuum extraction assisted delivery (V45 89) (S39 097)   · History of Irritable bowel syndrome (564 1) (K58 9)   · History of Lactational amenorrhea (626 0) (N91 2)   · History of Need for prophylactic vaccination against single diseases (V05 9) (Z23)   · History of Palpitations (785 1) (R00 2)   · History of Pap smear of cervix with ASCUS, cannot exclude HGSIL (795 02) (R87 611)   · Personal history of respiratory system disease (V12 60) (Z87 09)   · History of Preop examination (V72 84) (Z01 818)   · History of Supervision of other normal pregnancy, second trimester   · History of  (spontaneous vaginal delivery) (650) (O80)    Surgical History    · History of Appendectomy   · History of Colposcopy Cervix With Biopsy(S)   · History of Oral Surgery Tooth Extraction   · History of Partial Colectomy   · History of Surgery Of The Lacrimal System    Family History  Mother    · FH: thyroid cancer (V16 8) (Z80 8)  Father    · History of hyperlipidemia  Maternal Grandmother    · Family history of Arthritis (V17 7)   · Family history of Coronary Artery Disease (V17 49)   · FH: thyroid cancer (V16 8) (Z80 8)   · Family history of Transient Ischemic Attack  Paternal Grandfather    · Family history of Colon cancer  Maternal Aunt    · Family history of lung cancer (V16 1) (Z80 1)   · Family history of Reported A Previous Heart Murmur  Paternal Aunt    · Family history of lymphoma (V16 7) (Z80 2)   · Family history of Genetic History    Social History    · Activities: Soccer   · Always uses seat belt   · Currently sexually active   · Drinks beer (V49 89) (Z78 9)   · Denied: History of Drinks coffee   · Exercise: Running   · Exercises moderately less than 3 times a week   · Marital History - Currently    · Never smoked tobacco (V49 89) (Z78 9)   · No drug use   · Oral contraceptives   · Social alcohol use (Z78 9)    Current Meds   1   Apri 0 15-30 MG-MCG Oral Tablet; TAKE 1 TABLET DAILY; Therapy: 35MNB0424 to (Evaluate:13Jun2017)  Requested for: 24DXL1515; Last   Rx:93Pjf6717 Ordered   2  Colace 100 MG Oral Capsule; TAKE 1 CAPSULE DAILY, as needed; Therapy: 05IXF0072 to (Анна Monroy)  Requested for: 52FAM7576 Recorded   3  Polyethylene Glycol 3350 Oral Packet; MIX 1 PACKET IN 8 OUNCES OF LIQUID AND   DRINK ONCE DAILY; Therapy: 56AJN4462 to (Evaluate:17Mar2017)  Requested for: 23GTR4004; Last   Rx:22Mar2016 Ordered   4  Prenatal 28-0 8 MG Oral Tablet; TAKE 1 TABLET DAILY; Therapy: (Recorded:64Wyx1018) to Recorded   5  Sertraline HCl - 25 MG Oral Tablet; TAKE 1 TABLET DAILY; Therapy: 35OGL0548 to (Evaluate:06Opa1055)  Requested for: 79Vgd1489; Last   Rx:43Hxi2904 Ordered    Allergies    1  Amoxicillin-Pot Clavulanate TABS   2  fluoxetine   3  Penicillins    4  No Known Food Allergies   5  Seasonal    Vitals   Recorded: 73WZI8143 04:58PM   Heart Rate 76   Respiration 16   Systolic 440, RUE, Sitting   Diastolic 76, RUE, Sitting   BP CUFF SIZE Large   Height 5 ft 8 in   Weight 188 lb 0 6 oz   BMI Calculated 28 59   BSA Calculated 1 99     Physical Exam    Constitutional   General appearance: No acute distress, well appearing and well nourished  Head and Face   Head and face: Normal     Eyes   Conjunctiva and lids: No swelling, erythema or discharge  Pupils and irises: Equal, round, reactive to light  Ears, Nose, Mouth, and Throat   External inspection of ears and nose: Normal     Otoscopic examination: Tympanic membranes translucent with normal light reflex  Canals patent without erythema  Hearing: Normal     Nasal mucosa, septum, and turbinates: Normal without edema or erythema  Lips, teeth, and gums: Normal, good dentition  Oropharynx: Normal with no erythema, edema, exudate or lesions  Neck   Neck: Supple, symmetric, trachea midline, no masses      Pulmonary   Respiratory effort: No increased work of breathing or signs of respiratory distress  Auscultation of lungs: Clear to auscultation  Cardiovascular   Auscultation of heart: Normal rate and rhythm, normal S1 and S2, no murmurs  Pedal pulses: 2+ bilaterally  Examination of extremities for edema and/or varicosities: Normal     Abdomen   Abdomen: Non-tender, no masses  Psychiatric   Mood and affect: Normal        Results/Data  (1) CBC/PLT/DIFF 24HOF1577 10:49AM [facility]     Test Name Result Flag Reference   WBC COUNT 5 13 Thousand/uL  4 31-10 16   RBC COUNT 4 81 Million/uL  3 81-5 12   HEMOGLOBIN 14 2 g/dL  11 5-15 4   HEMATOCRIT 42 7 %  34 8-46  1   MCV 89 fL  82-98   MCH 29 5 pg  26 8-34 3   MCHC 33 3 g/dL  31 4-37 4   RDW 12 7 %  11 6-15 1   MPV 10 9 fL  8 9-12 7   PLATELET COUNT 777 Thousands/uL  149-390   nRBC AUTOMATED 0 /100 WBCs     NEUTROPHILS RELATIVE PERCENT 51 %  43-75   LYMPHOCYTES RELATIVE PERCENT 42 %  14-44   MONOCYTES RELATIVE PERCENT 5 %  4-12   EOSINOPHILS RELATIVE PERCENT 2 %  0-6   BASOPHILS RELATIVE PERCENT 0 %  0-1   NEUTROPHILS ABSOLUTE COUNT 2 60 Thousands/?L  1 85-7 62   LYMPHOCYTES ABSOLUTE COUNT 2 16 Thousands/?L  0 60-4 47   MONOCYTES ABSOLUTE COUNT 0 25 Thousand/?L  0 17-1 22   EOSINOPHILS ABSOLUTE COUNT 0 10 Thousand/?L  0 00-0 61   BASOPHILS ABSOLUTE COUNT 0 01 Thousands/?L  0 00-0 10     (1) COMPREHENSIVE METABOLIC PANEL [id number] 43:38NS [facility]     Test Name Result Flag Reference   GLUCOSE,RANDM 81 mg/dL     If the patient is fasting, the ADA then defines impaired fasting glucose as > 100 mg/dL and diabetes as > or equal to 123 mg/dL     SODIUM 139 mmol/L  136-145   POTASSIUM 4 0 mmol/L  3 5-5 3   CHLORIDE 105 mmol/L  100-108   CARBON DIOXIDE 28 mmol/L  21-32   ANION GAP (CALC) 6 mmol/L  4-13   BLOOD UREA NITROGEN 13 mg/dL  5-25   CREATININE 0 67 mg/dL  0 60-1 30   Standardized to IDMS reference method   CALCIUM 8 7 mg/dL  8 3-10 1   BILI, TOTAL 0 71 mg/dL  0 20-1 00   ALK PHOSPHATAS 72 U/L     ALT (SGPT) 26 U/L  12-78   AST(SGOT) 15 U/L  5-45   ALBUMIN 3 5 g/dL  3 5-5 0   TOTAL PROTEIN 7 0 g/dL  6 4-8 2   eGFR Non-African American      >60 0 ml/min/1 73sq m   [facility] Disease Education Program recommendations are as follows:  GFR calculation is accurate only with a steady state creatinine  Chronic Kidney disease less than 60 ml/min/1 73 sq  meters  Kidney failure less than 15 ml/min/1 73 sq  meters  (1) LIPID PANEL FASTING W DIRECT LDL REFLEX [id number] 10:49AM [facility]     Test Name Result Flag Reference   CHOLESTEROL 257 mg/dL H    LDL CHOLESTEROL CALCULATED 160 mg/dL H 0-100   Triglyceride:         Normal              <150 mg/dl       Borderline High    150-199 mg/dl       High               200-499 mg/dl       Very High          >499 mg/dl  Cholesterol:         Desirable        <200 mg/dl      Borderline High  200-239 mg/dl      High             >239 mg/dl  HDL Cholesterol:        High    >59 mg/dL      Low     <41 mg/dL  LDL Cholesterol:        Optimal          <100 mg/dl        Near Optimal     100-129 mg/dl        Above Optimal          Borderline High   130-159 mg/dl          High              160-189 mg/dl          Very High        >189 mg/dl  LDL CALCULATED:    This screening LDL is a calculated result  It does not have the accuracy of the Direct Measured LDL in the monitoring of patients with hyperlipidemia and/or statin therapy  Direct Measure LDL (FJK507) must be ordered separately in these patients  TRIGLYCERIDES 147 mg/dL  <=150   Specimen collection should occur prior to N-Acetylcysteine or Metamizole administration due to the potential for falsely depressed results  HDL,DIRECT 68 mg/dL H 40-60   Specimen collection should occur prior to Metamizole administration due to the potential for falsely depressed results       (1) TSH WITH FT4 REFLEX [id number] 10:49AM [facility]     Test Name Result Flag Reference   TSH 0 710 uIU/mL  0 358-3 740   Patients undergoing fluorescein dye angiography may retain small amounts of fluorescein in the body for 48-72 hours post procedure  Samples containing fluorescein can produce falsely depressed TSH values  If the patient had this procedure,a specimen should be resubmitted post fluorescein clearance  The recommended reference ranges for TSH during pregnancy are as follows:  First trimester 0 1 to 2 5 uIU/mL  Second trimester  0 2 to 3 0 uIU/mL  Third trimester 0 3 to 3 0 uIU/m     (1) CBC/PLT/DIFF [id number] 10:49AM [facility]     Test Name Result Flag Reference   WBC COUNT 5 13 Thousand/uL  4 31-10 16   RBC COUNT 4 81 Million/uL  3 81-5 12   HEMOGLOBIN 14 2 g/dL  11 5-15 4   HEMATOCRIT 42 7 %  34 8-46  1   MCV 89 fL  82-98   MCH 29 5 pg  26 8-34 3   MCHC 33 3 g/dL  31 4-37 4   RDW 12 7 %  11 6-15 1   MPV 10 9 fL  8 9-12 7   PLATELET COUNT 295 Thousands/uL  149-390   nRBC AUTOMATED 0 /100 WBCs     NEUTROPHILS RELATIVE PERCENT 51 %  43-75   LYMPHOCYTES RELATIVE PERCENT 42 %  14-44   MONOCYTES RELATIVE PERCENT 5 %  4-12   EOSINOPHILS RELATIVE PERCENT 2 %  0-6   BASOPHILS RELATIVE PERCENT 0 %  0-1   NEUTROPHILS ABSOLUTE COUNT 2 60 Thousands/?L  1 85-7 62   LYMPHOCYTES ABSOLUTE COUNT 2 16 Thousands/?L  0 60-4 47   MONOCYTES ABSOLUTE COUNT 0 25 Thousand/?L  0 17-1 22   EOSINOPHILS ABSOLUTE COUNT 0 10 Thousand/?L  0 00-0 61   BASOPHILS ABSOLUTE COUNT 0 01 Thousands/?L  0 00-0 10     (1) COMPREHENSIVE METABOLIC PANEL [id number] 66:49TL [place] [facility]     Test Name Result Flag Reference   GLUCOSE,RANDM 81 mg/dL     If the patient is fasting, the ADA then defines impaired fasting glucose as > 100 mg/dL and diabetes as > or equal to 123 mg/dL     SODIUM 139 mmol/L  136-145   POTASSIUM 4 0 mmol/L  3 5-5 3   CHLORIDE 105 mmol/L  100-108   CARBON DIOXIDE 28 mmol/L  21-32   ANION GAP (CALC) 6 mmol/L  4-13   BLOOD UREA NITROGEN 13 mg/dL  5-25   CREATININE 0 67 mg/dL  0 60-1 30   Standardized to IDMS reference method   CALCIUM 8 7 mg/dL  8 3-10 1   BILI, TOTAL 0 71 mg/dL  0 20-1 00   ALK PHOSPHATAS 72 U/L     ALT (SGPT) 26 U/L  12-78   AST(SGOT) 15 U/L  5-45   ALBUMIN 3 5 g/dL  3 5-5 0   TOTAL PROTEIN 7 0 g/dL  6 4-8 2   eGFR Non-African American      >60 0 ml/min/1 73sq m   [facility] Disease Education Program recommendations are as follows:  GFR calculation is accurate only with a steady state creatinine  Chronic Kidney disease less than 60 ml/min/1 73 sq  meters  Kidney failure less than 15 ml/min/1 73 sq  meters  (1) LIPID PANEL FASTING W DIRECT LDL REFLEX [id number] 10:49AM [facility]     Test Name Result Flag Reference   CHOLESTEROL 257 mg/dL H    LDL CHOLESTEROL CALCULATED 160 mg/dL H 0-100   Triglyceride:         Normal              <150 mg/dl       Borderline High    150-199 mg/dl       High               200-499 mg/dl       Very High          >499 mg/dl  Cholesterol:         Desirable        <200 mg/dl      Borderline High  200-239 mg/dl      High             >239 mg/dl  HDL Cholesterol:        High    >59 mg/dL      Low     <41 mg/dL  LDL Cholesterol:        Optimal          <100 mg/dl        Near Optimal     100-129 mg/dl        Above Optimal          Borderline High   130-159 mg/dl          High              160-189 mg/dl          Very High        >189 mg/dl  LDL CALCULATED:    This screening LDL is a calculated result  It does not have the accuracy of the Direct Measured LDL in the monitoring of patients with hyperlipidemia and/or statin therapy  Direct Measure LDL (PJH029) must be ordered separately in these patients  TRIGLYCERIDES 147 mg/dL  <=150   Specimen collection should occur prior to N-Acetylcysteine or Metamizole administration due to the potential for falsely depressed results  HDL,DIRECT 68 mg/dL H 40-60   Specimen collection should occur prior to Metamizole administration due to the potential for falsely depressed results       (1) TSH WITH FT4 REFLEX 23Nov2016 10:49AM [facility]     Test Name Result Flag Reference   TSH 0 710 uIU/mL  0 358-3 740   Patients undergoing fluorescein dye angiography may retain small amounts of fluorescein in the body for 48-72 hours post procedure  Samples containing fluorescein can produce falsely depressed TSH values  If the patient had this procedure,a specimen should be resubmitted post fluorescein clearance  The recommended reference ranges for TSH during pregnancy are as follows:  First trimester 0 1 to 2 5 uIU/mL  Second trimester  0 2 to 3 0 uIU/mL  Third trimester 0 3 to 3 0 uIU/m     (B) PAP DEPENDENT HPV COTEST >= 27YEARS OLD 22Jun2016 09:30AM [facility]     Test Name Result Flag Reference   PAP, LIQUID-BASED NILM     DIAGNOSIS:            Negative for intraepithelial lesion or malignancy  ADEQUACY:             Satisfactory for evaluation /                         Endocervical/transformation zone component                         present  COMMENT:              This Pap smear was screened with the assistance                         of the ZOCKOPrep(TM) Imaging System and                         screened by a cytotechnologist   SPECIMEN SOURCE:      Pap Dependent HPV Cotest >= 27years old, CERVIX  CLINICAL INFORMATION: LMP: 6/1/2016                        Provided Diagnosis Codes: Z01 419,Z11 51, V72 31,                         Z11 51                                                Cervicovaginal cytology should be considered a                         screening procedure subject to false negatives                         and false positives  Results are more reliable                         when a satisfactory sample is obtained on a                         regular repetitive basis, and should be                         interpreted together with past and current                         clinical data    ELECTRONICALLY SIGNED   BY:                   Screened By: VERO Romero (ASCP)   Case Electronically Signed 06/25/2016   HPV HR (NON 16/18) Not Detected     HPV 18+ Not Detected     HPV16+ Not Detected     HPV HR(NON 16/18) (1,2,3,4)  HPV 18+ (1,2,3,4)  HPV16+ (1,2,3,4)  (1)  The arturo(R) HPV test is FDA-cleared for ThinPrep(R) specimens and   detects genomic HPV DNA in the polymorphic L1 region in 14 subtypes:   Type 16, Type 18, and other high risk types   (92,30,75,95,36,08,80,07,85,37,50,85)  The test has been modified and   validated for use in SurePath(TM) specimens  (2)  HPV types 16 and/or 18 that were Not Detected were undetectable or   below the pre-set threshold  (3)  The non-repeat rate for HPV genotyping assays varies from 5 to 15%  In   the NILM cytology category, there is a low positive predictive value   (PPV = 15-20%) for CIN2+ with a positive high risk HPV result  (4)  Results should be interpreted together with past and current clinical   and laboratory data  Health Management  History of Screening for human papillomavirus (HPV)   (1) THIN PREP PAP WITH IMAGING; every 3 years; Last [id number]; Next Due:  01Apr2016; Overdue    Future Appointments    Date/Time Provider Specialty Site   06/28/2017 09:00 AM BETZY Bush   Obstetrics/Gynecology [facility]   06/09/2017 09:30 AM Mario Nichols DO Internal Medicine [facility]     Signatures   Electronically signed by : Jimmy Jain DO; Dec  4 2016 10:22AM EST                       (Author)

## 2018-01-17 ENCOUNTER — ALLSCRIPTS OFFICE VISIT (OUTPATIENT)
Dept: OTHER | Facility: OTHER | Age: 38
End: 2018-01-17

## 2018-01-18 NOTE — RESULT NOTES
Message   Notify the patient throat culture was negative for falls as scheduled        Verified Results  (1) THROAT CULTURE (CULTURE, UPPER RESPIRATORY) [id number] 08:02PM Shannon Crabtree Order Number: [id number]     Test Name Result Flag Reference   CLINICAL REPORT (Report)     Test:        Throat culture  Specimen Type:   Throat  Specimen Date:   5/19/2016 8:02 PM  Result Date:    5/22/2016 12:23 PM  Result Status:   Final result  Resulting Lab:   [place] [address]            Tel: [phone]      CULTURE                                       ------------------                                   Negative for beta-hemolytic Streptococcus       Signatures   Electronically signed by : Salma Cheng DO; May 22 2016  6:10PM EST                       (Author)

## 2018-01-19 ENCOUNTER — GENERIC CONVERSION - ENCOUNTER (OUTPATIENT)
Dept: OTHER | Facility: OTHER | Age: 38
End: 2018-01-19

## 2018-01-19 ENCOUNTER — LAB REQUISITION (OUTPATIENT)
Dept: LAB | Facility: HOSPITAL | Age: 38
End: 2018-01-19
Payer: COMMERCIAL

## 2018-01-19 DIAGNOSIS — Z34.81 ENCOUNTER FOR SUPERVISION OF OTHER NORMAL PREGNANCY, FIRST TRIMESTER: ICD-10-CM

## 2018-01-19 PROCEDURE — 87591 N.GONORRHOEAE DNA AMP PROB: CPT | Performed by: NURSE PRACTITIONER

## 2018-01-19 PROCEDURE — 87491 CHLMYD TRACH DNA AMP PROBE: CPT | Performed by: NURSE PRACTITIONER

## 2018-01-19 NOTE — PROGRESS NOTES
Assessment   1  Acute non-recurrent maxillary sinusitis (461 0) (J01 00)    Plan   Acute non-recurrent maxillary sinusitis    · Zithromax Z-Marino 250 MG Oral Tablet (Azithromycin); TAKE 2 TABLETS ON DAY 1    THEN TAKE 1 TABLET A DAY FOR 4 DAYS    Discussion/Summary      Start antibiotics of fluids and rest     Possible side effects of new medications were reviewed with the patient/guardian today  The treatment plan was reviewed with the patient/guardian  The patient/guardian understands and agrees with the treatment plan      Chief Complaint   Patient presents today for concerns of URI  History of Present Illness   HPI: last week started with sinus infection fever weeks pregnant      Review of Systems        Constitutional: feeling poorly-- and-- feeling tired, but-- no fever-- and-- no chills  ENT: sore throat-- and-- nasal discharge, but-- no earache,-- no nosebleeds-- and-- no hearing loss  Cardiovascular: no complaints of slow or fast heart rate, no chest pain, no palpitations, no leg claudication or lower extremity edema  Respiratory: no complaints of shortness of breath, no wheezing, no dyspnea on exertion, no orthopnea or PND  Gastrointestinal: no complaints of abdominal pain, no constipation, no nausea or diarrhea, no vomiting, no bloody stools  Genitourinary: no complaints of dysuria, no incontinence, no pelvic pain, no dysmenorrhea, no vaginal discharge or abnormal vaginal bleeding  Musculoskeletal: no complaints of arthralgia, no myalgia, no joint swelling or stiffness, no limb pain or swelling  Integumentary: no complaints of skin rash or lesion, no itching or dry skin, no skin wounds  Neurological: no complaints of headache, no confusion, no numbness or tingling, no dizziness or fainting  Active Problems   1  Chronic constipation (564 00) (K59 09)   2  Elderly multigravida, first trimester (659 63) (O09 521)   3   Encounter for gynecological examination without abnormal finding (V72 31) (Z01 419)   4  Encounter for supervision of normal pregnancy in multigravida in first trimester (V22 1)     (Z34 81)   5  Globus sensation (306 4) (F45 8)   6  Hyperlipidemia (272 4) (E78 5)   7  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)   8  Pregnancy (V22 2) (Z34 90)    Past Medical History   Active Problems And Past Medical History Reviewed: The active problems and past medical history were reviewed and updated today  Surgical History   Surgical History Reviewed: The surgical history was reviewed and updated today  Social History    · Activities: Soccer   · Always uses seat belt   · Currently sexually active   · Drinks beer (V49 89) (Z78 9)   · Denied: History of Drinks coffee   · Exercise: Running   · Exercises moderately less than 3 times a week   · Marital History - Currently    · Never smoked tobacco (V49 89) (Z78 9)   · No drug use   · Oral contraceptives   · Social alcohol use (Z78 9)  The social history was reviewed and updated today  The social history was reviewed and is unchanged  Family History   Family History Reviewed: The family history was reviewed and updated today  Current Meds    1  Prenatal Vitamins TABS; Therapy: (Recorded:[id number]) to Recorded     The medication list was reviewed and updated today  Allergies   1  Amoxicillin-Pot Clavulanate TABS   2  fluoxetine   3  Penicillins  4  No Known Food Allergies   5  Seasonal    Vitals    Recorded: [id number] 01:03PM   Heart Rate 91   Respiration 18   Systolic 706   Diastolic 68   Height 5 ft 8 in   Weight 194 lb 0 6 oz   BMI Calculated 29 5   BSA Calculated 2 02   O2 Saturation 99     Physical Exam        Constitutional      General appearance: No acute distress, well appearing and well nourished  Eyes      Conjunctiva and lids: No swelling, erythema or discharge  Pupils and irises: Equal, round and reactive to light         Ears, Nose, Mouth, and Throat      External inspection of ears and nose: Normal        Otoscopic examination: Tympanic membranes translucent with normal light reflex  Canals patent without erythema  Nasal mucosa, septum, and turbinates: Normal without edema or erythema  Oropharynx: Normal with no erythema, edema, exudate or lesions  Pulmonary      Respiratory effort: No increased work of breathing or signs of respiratory distress  Auscultation of lungs: Clear to auscultation  Cardiovascular      Palpation of heart: Normal PMI, no thrills  Auscultation of heart: Normal rate and rhythm, normal S1 and S2, without murmurs  Examination of extremities for edema and/or varicosities: Normal        Carotid pulses: Normal        Abdomen      Abdomen: Non-tender, no masses  Liver and spleen: No hepatomegaly or splenomegaly  Lymphatic      Palpation of lymph nodes in neck: No lymphadenopathy  Musculoskeletal      Gait and station: Normal        Digits and nails: Normal without clubbing or cyanosis  Inspection/palpation of joints, bones, and muscles: Normal        Skin      Skin and subcutaneous tissue: Normal without rashes or lesions  Neurologic      Cranial nerves: Cranial nerves 2-12 intact  Reflexes: 2+ and symmetric  Sensation: No sensory loss  Psychiatric      Orientation to person, place, and time: Normal        Mood and affect: Normal           Future Appointments      Date/Time Provider Specialty Site   01/19/2018 07:40 AM LACI Aguilar Obstetrics/Gynecology [facility]   09/18/2018 01:00 PM Tex Varma DO [facility]   07/24/2018 03:40 PM BETZY Chiu   Obstetrics/Gynecology [facility]     Signatures    Electronically signed by : Jojo Disla; Jan 18 2018 11:57AM EST                       (Author)     Electronically signed by : Dayday Kelly DO; Jan 18 2018  7:20PM EST (Author)

## 2018-01-21 ENCOUNTER — LAB CONVERSION - ENCOUNTER (OUTPATIENT)
Dept: OTHER | Facility: OTHER | Age: 38
End: 2018-01-21

## 2018-01-21 ENCOUNTER — GENERIC CONVERSION - ENCOUNTER (OUTPATIENT)
Dept: OTHER | Facility: OTHER | Age: 38
End: 2018-01-21

## 2018-01-21 LAB
ABNORMAL MSS? (HISTORICAL): NO
ABNORMAL US? (HISTORICAL): NO
ADVANCED MATERNAL AGE? (HISTORICAL): YES
CHROMOSOME ANALYSIS (HISTORICAL): NORMAL
CHROMOSOME, BLOOD (HISTORICAL): DETECTED
CLINICAL HISTORY (HISTORICAL): NO
COMMENTS: (HISTORICAL): NORMAL
FETAL FRACTION (HISTORICAL): NORMAL
GESTATIONAL AGE (HISTORICAL): 14
GESTATIONAL AGE (HISTORICAL): 3
INTERPRETATION (HISTORICAL): NORMAL
LIMITATIONS (HISTORICAL): NORMAL
METHODOLOGY (HISTORICAL): NORMAL
MICRODELETION (HISTORICAL): NORMAL
MICRODELETION INTERPR. (HISTORICAL): NORMAL
NUMBER OF FETUSES (HISTORICAL): 1
SPECIFICATIONS (HISTORICAL): NORMAL
TRISOMY 13 (T13) (HISTORICAL): NEGATIVE
TRISOMY 18 (T18) (HISTORICAL): NEGATIVE
TRISOMY 21 (T21) (HISTORICAL): NEGATIVE

## 2018-01-22 ENCOUNTER — GENERIC CONVERSION - ENCOUNTER (OUTPATIENT)
Dept: OTHER | Facility: OTHER | Age: 38
End: 2018-01-22

## 2018-01-22 VITALS
RESPIRATION RATE: 20 BRPM | HEART RATE: 68 BPM | BODY MASS INDEX: 29.55 KG/M2 | HEIGHT: 68 IN | WEIGHT: 195.01 LBS | SYSTOLIC BLOOD PRESSURE: 142 MMHG | TEMPERATURE: 97.6 F | DIASTOLIC BLOOD PRESSURE: 82 MMHG

## 2018-01-22 LAB
CHLAMYDIA DNA CVX QL NAA+PROBE: NORMAL
N GONORRHOEA DNA GENITAL QL NAA+PROBE: NORMAL

## 2018-01-23 VITALS
DIASTOLIC BLOOD PRESSURE: 68 MMHG | SYSTOLIC BLOOD PRESSURE: 120 MMHG | RESPIRATION RATE: 18 BRPM | OXYGEN SATURATION: 99 % | WEIGHT: 194.04 LBS | BODY MASS INDEX: 29.41 KG/M2 | HEIGHT: 68 IN | HEART RATE: 91 BPM

## 2018-01-23 VITALS
DIASTOLIC BLOOD PRESSURE: 66 MMHG | BODY MASS INDEX: 29.67 KG/M2 | HEART RATE: 94 BPM | WEIGHT: 195.8 LBS | SYSTOLIC BLOOD PRESSURE: 118 MMHG | HEIGHT: 68 IN

## 2018-01-23 VITALS
BODY MASS INDEX: 29.86 KG/M2 | DIASTOLIC BLOOD PRESSURE: 65 MMHG | WEIGHT: 197 LBS | SYSTOLIC BLOOD PRESSURE: 112 MMHG | HEIGHT: 68 IN

## 2018-01-23 NOTE — PROGRESS NOTES
2018         RE: Oscar Rubalcava                                    To: [facility]  MR#: [id number]                                   [address]ie [address]  : [address]: [id number]:Shaquille Kebede U  6    (Exam #: [id number])                           Fax: [phone]      The LMP of this 30year old,  G3, P2-0-0-2 patient was OCT 8 2017, giving   her an DINA of JUL 15 2018 and a current gestational age of 17 weeks 1 day   by dates  A sonographic examination was performed on 2018 using real   time equipment  The ultrasound examination was performed using abdominal   technique  The patient has a BMI of 30 0  Her blood pressure today was   112/65  Earliest US on record: 17 8w6d  DINA 18 Multiple longitudinal   and transverse sections revealed a strickland intrauterine pregnancy with   the fetus in variable presentation  The placenta is anterior, fundal in   implantation  Cardiac motion was observed at 164 bpm       INDICATIONS      advanced maternal age      Exam Types      Level I      RESULTS      Fetus # 1 of 1   Variable presentation   Fetal growth appeared normal      MEASUREMENTS (* Included In Average GA)      CRL              6 4 cm        12 weeks 4 days*   Nuchal Trans    1 60 mm      THE AVERAGE GESTATIONAL AGE is 12 weeks 4 days +/- 7 days  ANATOMY COMMENTS      Anatomic detail is limited at this gestational age  The fetal cranium   appeared normal in shape and the nuchal translucency was normal in size at   1 6 mm  The nasal bone appears to be present  The intracranial anatomy   was unremarkable  Anatomy of the fetal thorax appeared within normal   limits with a normal cardiac axis and first trimester three vessel   tracheal view  The cardiac rhythm was regular and documented with M-mode      Within the abdomen, the stomach & bladder were visualized and the abdominal wall appeared intact  A three vessel cord appears to be present  Active movement of the fetal body & extremities was seen  There is no   suspicion of a subchorionic bleed  The placental cord insertion appeared   normal    There is no suspicion of a uterine myoma  Free fluid is not seen   in the posterior cul-de-sac  The spine could not be evaluated due to   fetal position  ADNEXA      The left ovary appeared normal and measured 3 0 x 2 5 x 1 3 cm with a   volume of 5 1 cc  The right ovary appeared normal and measured 1 8 x 1 8 x   1 4 cm with a volume of 2 4 cc  AMNIOTIC FLUID         Largest Vertical Pocket = 3 3 cm   Amniotic Fluid: Normal      IMPRESSION      Chacko IUP   12 weeks and 4 days by this ultrasound  (DINA=2018)   Variable presentation   Fetal growth appeared normal   Regular fetal heart rate of 164 bpm   Anterior, fundal placenta      CONSULT COMMENT      Thank you very much for your kind referral of Yen Shelton  to the [facility] in [place] on 2018 for first-trimester ultrasound   evaluation and MFM consult  Charanjit Unger is a 61-year-old  3 para 2000   white female who is currently at 15 1/7 weeks gestation by an estimated   due date of July 15, 2018 which is based on menstrual dating  She   presents for the indication of advanced maternal age  She will be 41  years old and her estimated due date  She met  with Nish Orlando for   genetic counseling for the indication of advanced maternal age and has   decided to pursue cell free DNA analysis for genetic screening  Her   prenatal course so far has been unremarkable  Charanjit Unger has no complaints  She denies vaginal bleeding      Charanjit Unger has a history of 2 prior vaginal deliveries at term following   uncomplicated prenatal courses  She delivered appropriately grown babies,   each currently healthy  She has a history of an anxiety disorder,   currently not treated medically      She also has a history of right bundle   branch block  Franky Corey  had a partial colectomy at [facility] in   December of 2015 for the indication of a low-grade mucinous appendiceal   neoplasm  She has had follow-up with Surgical Oncology at [facility] for this indication  Her past medical history is otherwise   unremarkable  Her past surgical history is otherwise significant for a   LEEP procedure and wisdom teeth removal   She currently takes no   medication with the exception of a prenatal vitamin on a daily basis  Franky Corey has drug allergies to amoxicillin and fluoxetine  Today's ultrasound findings and suggested follow-up were discussed in   detail with Cammy  At age 41, her risk for a live born baby with Down   syndrome is 1 in 56 and a risk for a live born baby with any chromosomal   abnormality of 1 in 80  We discussed that definitive prenatal diagnosis   is possible only through genetic amniocentesis or chorionic villus   sampling  Franky Corey will pursue cell free DNA analysis once preauthorization   is obtained from her insurance company  MSAFP testing is recommended at   16 to 20 weeks gestation  She will return to the [address] [place] [address] at 20   weeks gestation for Level II ultrasound assessment  The face to face time, in addition to time spent discussing ultrasound   results, was approximately 15 minutes, greater than 50% of which was spent   during counseling and coordination of care  ARAMIS Quezada M D  Maternal-Fetal Medicine   Electronically signed 01/09/18 18:12           Electronically signed by:Kota HERNADEZ    Michael 10 2018  6:50AM EST

## 2018-01-23 NOTE — PROGRESS NOTES
Chief Complaint  Patient and , Kristopher Day, seen for genetic counseling to discuss maternal age related risk for aneuploidy  At the time of our genetic counseling session Charanjit Unger was approximately 13 weeks 1 day pregnant with her third pregnancy  Her prior pregnancy history is significant for two previous full-term vaginal deliveries of healthy daughters now ages 11 and 1 also with her current partner  At the age of 41 there is an estimated 1/148 risk for a fetal chromosome abnormality at term  Approximately half of these abnormalities are due to Down syndrome with the remainder due to other chromosome abnormalities  I reviewed with the patient the options regarding prenatal diagnosis for chromosome abnormalities including CVS and amniocentesis  Chorionic villus sampling(CVS) is generally performed between 10 and 12 weeks of pregnancy and amniocentesis is generally performed at 16 weeks or later  Recent literature supports that CVS and amniocentesis both have an associated procedure related risk of miscarriage of less than 1/300  Chromosome results from CVS or amniocentesis are greater than 99 9% accurate  Occasionally a repeat procedure may be necessary due to cell culture failure  Approximately 1% of the time, a mosaic chromosome result from CVS will necessitate a followup amniocentesis  Measurement of AFP from amniotic fluid is able to detect approximately 95% of open neural tube defects  Maternal serum AFP is recommended for patients who elect CVS     We also reviewed the availability and limitations of sequential screening, NIPS, and level II ultrasound evaluation   Sequential screening consisting of first trimester measurement of nuchal translucency combined with first trimester biochemical analysis as well as second trimester biochemical analysis is able to detect approximately 90% of pregnancies in which the fetus has Down syndrome, 90% of pregnancies in which the fetus has trisomy 25 and 80% of pregnancies which appears has an open neural tube defect  NIPS involves assessment of fragments of fetal DNA in maternal blood  This test has varying detection rates depending on the laboratory used though the quoted detection rate for Down syndrome is generally greater than 99% and detection rate for trisomy 18 is 98% or better  NIPS has a low false positive rate however consideration of prenatal diagnostic testing is always recommended following a positive NIPS  MSAFP is recommended for patients electing NIPS  Level II ultrasound evaluation is able to detect many major physical birth defects and variations in fetal development that may be associated with chromosome abnormalities  Level II ultrasound evaluation is not able to detect all birth defects or health problems  After discussing the available prenatal diagnostic and screening procedures couple elected to decline prenatal diagnostic testing at this time  They did opt to move forward with NIPS  [employer] requires prior authorization  Our office will initiate the process and contact the patient once authorization has been obtained  In addition the patient is scheduled for nuchal translucency ultrasound following the genetic counseling session and is planning on pursuing level 2 ultrasound evaluation at the appropriate time  During our counseling session history is were taken on the patient's family and her 's family  Jovanny's family history was negative for any prior known cases of intellectual disability, birth defects or single gene disorders  In reviewing Cammy's family history she reported having a paternal aunt with Lake syndrome  We discussed the fact that Lake syndrome usually results from a random air in chromosome division which should not increase the risk to other family members  Less commonly Lake syndrome may result from a chromosome translocation involving the X chromosome       Ramesh Lopez also reports having a maternal aunt born with an isolated congenital heart defect which she described as a small hole surgically corrected in childhood  We reviewed the multifactorial inheritance of isolated congenital heart defects and discussed that given the distance of the relative this history is not likely to significantly increase her risk over the general population for having a child with congenital heart defect  We reviewed the availability limitations of ultrasound evaluation for detecting congenital heart defects prenatally  The patient reports being of [place], [place], [place] and [place] descent while her  reports being of [place] descent  They both deny having any known Evangelical ancestry  Carrier screening for CF, SMA, Fragile X and hemoglobinopathies was discussed  This couple elected to decline genetic carrier screening for CF, SMA and Fragile X  Hemoglobinopathy screening is recommended if not previously performed  Lastly, we discussed the fact that it is important to keep in mind that everyone in the general population regardless of age, family history, or medical background has approximately a 3% risk of having a child with some type of her defect, genetic disease or intellectual disability  Currently there are no tests available to rule out all birth defects or health problems  Active Problems    1  Chronic constipation (564 00) (K59 09)   2  Encounter for gynecological examination without abnormal finding (V72 31) (Z01 419)   3  Encounter for supervision of normal pregnancy in multigravida in first trimester (V22 1)   (Z34 81)   4  Globus sensation (306 4) (F45 8)   5  Hyperlipidemia (272 4) (E78 5)   6  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)   7  Pregnancy (V22 2) (Z34 90)    Past Medical History    1  History of Abdominal left lower quadrant tenderness (789 64) (R10 814)   2  History of Antepartum hemorrhage, antepartum (641 93) (O46 90)   3  History of Anxiety (300 00) (F41 9)   4  History of Blocked tear duct (375 56)   5  History of Breast pain (611 71) (N64 4)   6  History of Breastfeeding (infant) (V49 89) (Z78 9)   7  History of Dysplasia of cervix, low grade (JOANNE 1) (622 11) (N87 0)   8  History of Encounter for routine gynecological examination (V72 31) (Z01 419)   9  History of Gastroesophageal reflux disease without esophagitis (530 81) (K21 9)   10  History of acute mastitis (V13 89) (Z87 898)   11  History of acute otitis media (V12 49) (Z86 69)   12  History of acute sinusitis (V12 69) (Z87 09)   13  History of constipation (V12 79) (Z87 19)   14  History of gastroesophageal reflux (GERD) (V12 79) (Z87 19)   15  History of right bundle branch block (V15 1) (Z98 89)   16  History of thyroid cyst (V12 29) (Z86 39)   17  History of upper respiratory infection (V12 09) (Z87 09)   18  History of vacuum extraction assisted delivery (V45 89) (Z98 890)   19  History of Irritable bowel syndrome (564 1) (K58 9)   20  History of Lactational amenorrhea (626 0) (N91 2)   21  History of Need for prophylactic vaccination against single diseases (V05 9) (Z23)   22  Normal delivery (650) (O80,Z37 9)   23  History of Palpitations (785 1) (R00 2)   24  History of Pap smear of cervix with ASCUS, cannot exclude HGSIL (795 02) (R87 611)   25  Personal history of respiratory system disease (V12 60) (Z87 09)   26  Urinary tract infection (599 0) (N39 0)    Surgical History    1  History of Appendectomy   2  History of Colposcopy Cervix With Biopsy(S)   3  History of Oral Surgery Tooth Extraction   4  History of Partial Colectomy   5  History of Surgery Of The Lacrimal System    Family History    1  FH: thyroid cancer (V16 8) (Z80 8)    2  History of hyperlipidemia    3  Family history of Arthritis (V17 7)   4  Family history of Coronary Artery Disease (V17 49)   5  FH: thyroid cancer (V16 8) (Z80 8)   6  Family history of Transient Ischemic Attack    7  Family history of Colon cancer    8   Family history of lung cancer (V16 1) (Z80 1)   9  Family history of Reported A Previous Heart Murmur    10  Family history of lymphoma (V16 7) (Z80 2)   11  Family history of Genetic History    Social History    · Activities: Soccer   · Always uses seat belt   · Currently sexually active   · Drinks beer (V49 89) (Z78 9)   · Denied: History of Drinks coffee   · Exercise: Running   · Exercises moderately less than 3 times a week   · Marital History - Currently    · Never smoked tobacco (V49 89) (Z78 9)   · No drug use   · Oral contraceptives   · Social alcohol use (Z78 9)    Current Meds   1  Prenatal Vitamins TABS; Therapy: (Recorded:97Pzo8540) to Recorded    Allergies    1  Amoxicillin-Pot Clavulanate TABS   2  fluoxetine   3  Penicillins    4  No Known Food Allergies   5  Seasonal    Vitals  Signs   Recorded: 24EAT9486 26:48YW   Systolic: 316, LUE, Sitting  Diastolic: 65, LUE, Sitting  BP Cuff Size: Extra-Large  Height: 5 ft 8 in  Weight: 197 lb   BMI Calculated: 29 95  BSA Calculated: 2 03  Pain Scale: 0    Future Appointments    Date/Time Provider Specialty Site   2018 01:00 PM Unknown DO Anatoliy [facility]   2018 11:00 AM  Jhonny, [facility] [address]   2018 07:40 BETZY Carlin  [address] [place] [address]   2018 03:40 PM BETZY Farfan   Obstetrics/Gynecology [facility]     Signatures   Electronically signed by : Papo Alvares, ; 2018 10:52AM EST                       (Author)

## 2018-01-24 VITALS
SYSTOLIC BLOOD PRESSURE: 122 MMHG | BODY MASS INDEX: 29.45 KG/M2 | DIASTOLIC BLOOD PRESSURE: 78 MMHG | WEIGHT: 194.3 LBS | HEIGHT: 68 IN

## 2018-01-24 VITALS
WEIGHT: 193.2 LBS | BODY MASS INDEX: 29.28 KG/M2 | DIASTOLIC BLOOD PRESSURE: 64 MMHG | HEIGHT: 68 IN | SYSTOLIC BLOOD PRESSURE: 122 MMHG

## 2018-01-24 VITALS
HEIGHT: 68 IN | WEIGHT: 196.5 LBS | DIASTOLIC BLOOD PRESSURE: 78 MMHG | BODY MASS INDEX: 29.78 KG/M2 | SYSTOLIC BLOOD PRESSURE: 122 MMHG

## 2018-01-24 NOTE — RESULT NOTES
Message   Notify the patientLow vitamin-D level, please let me know how much vitamin-D the patient is currently taking        Verified Results  (1) VITAMIN D 25-HYDROXY [id number] 07:06AM Anyi Jones     Test Name Result Flag Reference   VIT D 25-HYDROX 12 9 ng/mL L 30 0-100 0   This assay is a certified procedure of the CDC Vitamin D Standardization Certification Program (VDSCP)     Deficiency <20ng/ml   Insufficiency 20-30ng/ml   Sufficient  ng/ml     *Patients undergoing fluorescein dye angiography may retain small amounts of fluorescein in the body for 48-72 hours post procedure  Samples containing fluorescein can produce falsely elevated Vitamin D values  If the patient had this procedure, a specimen should be resubmitted post fluorescein clearance

## 2018-01-24 NOTE — RESULT NOTES
Verified Results  (Q) QNATAL (TM) ADVANCED [id number] 01:30PM Zari Fernando     Test Name Result Flag Reference   NUMBER OF FETUSES 1     ADVANCED MATERNAL AGE? YES     ABNORMAL JESUS? NO     ABNORMAL US? NO     PERSONAL/FAM HISTORY? NO     INTERPRETATION SEE NOTE     This specimen showed expected representation of chromosome  21, 18, and 13 material  Microdeletion testing was not  performed per clinician request    TRISOMY 21 (T21) Negative     TRISOMY 18 (T18) Negative     TRISOMY 13 (T13) Negative     Y CHROMOSOME Detected     Y CHR  INTERPRETATION SEE NOTE     Consistent with a male fetus  SEX CHROMOSOME No aneuploidy     SEX CHROMOSOME INTERP SEE NOTE     No apparent abnormality was detected  See "Limitations"  below  MICRODELETION Opted Out     MICRODELETION INTERP SEE NOTE     Microdeletion testing was not performed per clinician  request    GESTATION AGE (IN WEEKS) 14     GESTATIONAL AGE (IN DAYS) 3     FETAL FRACTION 12 10%     LABORATORY COMMENTS SEE NOTE     Laboratory testing supervised and results monitored by  Cindy Baires MD, Vel Hernandes, [facility] [place]  LIMITATIONS SEE NOTE     This test has been validated on women with a strickland  pregnancy that is >=10 weeks gestational age  As such, the  accuracy of this test for specimens drawn at less than 10  weeks gestation is unknown  In addition, there are limited  data available for the performance of this test in multiple  gestation pregnancies and for the detection of  microdeletions  Specimens are analyzed for aneuploidies  involving chromosomes 21, 18, 13, X, and Y, and  microdeletions of the specified regions only  The Y  chromosome is analyzed for the determination of fetal sex,  and the sensitivity and specificity of this analysis may be  less than that of the autosome analysis  Sex chromosome  aneuploidy analysis is not performed for multiple gestation  pregnancies   Aneuploidies involving chromosomes other than  those specified above or abnormalities involving chromosomal  regions other than those specified are not included in this  testing  While the results of this test are highly accurate,  not all of the chromosome abnormalities interrogated may be  detected due to maternal, placental, or fetal mosaicism, or  other unexplained causes  The accuracy of the test results  may also be affected by the presence of chromosome  abnormalities or copy number variations that are maternal in  origin, or by vanishing twin syndrome in a multiple  gestation pregnancy  Circulating cell-free fetal DNA  screening does not replace the precision of diagnosis using  chorionic villus sampling or amniocentesis  It does not  assess the risk of fetal anomalies such as neural tube  defects or ventral wall defects, and should not be  considered in isolation from other clinical findings and  laboratory test results  Management decisions, including  pregnancy termination, should not be based solely on the  results of cell-free DNA screening  A negative test result  does not ensure an unaffected pregnancy  The healthcare  provider is responsible for the use of this information in  the management of his/her patient  Health care providers, please contact your local [facility] genetic counselor or call the [facility] [employer]  Services at [email or web address] ([phone]) for assistance with  interpretation of these results  SPECIFICATIONS SEE NOTE     Sensitivity         Specificity  T21        >99 9%               >99 9%     T18        >99 9%               >99 9%     T13        >99 9%               >99 9%               Accuracy  Y          >99 9%     Performance of the [employer] laboratory-developed test  (LDT) has been determined based on internal analytical  assessment  METHODOLOGY SEE NOTE     Circulating cell-free (cf) DNA was isolated from plasma  It  was then detected on a massively parallel sequencing  platform   Bioinformatic analysis was performed to determine  the representation of fetal DNA in the specimen, especially  fetal material from chromosomes 21, 18, and 13  The  representation of other fetal material, including the sex  chromosomes (X and Y) and select chromosomal regions (22q,  15q, 11q, 8q, 5p, 4p 1p), was also evaluated and will only  be reported as "Additional Chromosome Results" when an  abnormality is detected  This test was developed and its  performance characteristics have been determined by [facility], [facility] [address]  It has  not been cleared or approved by the U S  Food and Drug  Administration  The FDA has determined that such clearance  or approval is not necessary  Performance characteristics  refer to the analytical performance of the test  This test  is performed pursuant to a license agreement with [employer]  This test was developed and its analytical performance  characteristics have been determined by [facility]  It has not been  cleared or approved by FDA  This assay has been validated  pursuant to the CLIA regulations and is used for clinical  purposes

## 2018-02-07 ENCOUNTER — TELEPHONE (OUTPATIENT)
Dept: PERINATAL CARE | Facility: CLINIC | Age: 38
End: 2018-02-07

## 2018-02-15 ENCOUNTER — ROUTINE PRENATAL (OUTPATIENT)
Dept: OBGYN CLINIC | Facility: CLINIC | Age: 38
End: 2018-02-15

## 2018-02-15 VITALS — SYSTOLIC BLOOD PRESSURE: 121 MMHG | BODY MASS INDEX: 30.56 KG/M2 | WEIGHT: 201 LBS | DIASTOLIC BLOOD PRESSURE: 70 MMHG

## 2018-02-15 DIAGNOSIS — Z34.82 PRENATAL CARE, SUBSEQUENT PREGNANCY, SECOND TRIMESTER: Primary | ICD-10-CM

## 2018-02-15 DIAGNOSIS — O09.522 ELDERLY MULTIGRAVIDA IN SECOND TRIMESTER: ICD-10-CM

## 2018-02-15 PROBLEM — K63.5 BENIGN COLON POLYP: Status: ACTIVE | Noted: 2017-03-08

## 2018-02-15 PROBLEM — K58.9 IBS (IRRITABLE BOWEL SYNDROME): Status: ACTIVE | Noted: 2018-01-19

## 2018-02-15 PROBLEM — E55.9 VITAMIN D DEFICIENCY: Status: ACTIVE | Noted: 2018-01-23

## 2018-02-15 PROBLEM — O09.529 ADVANCED MATERNAL AGE IN MULTIGRAVIDA: Status: ACTIVE | Noted: 2018-01-19

## 2018-02-15 PROCEDURE — PNV: Performed by: OBSTETRICS & GYNECOLOGY

## 2018-02-15 NOTE — PROGRESS NOTES
Has URI, otherwise doing okay  It's a boy! Undecided on name, has two girls at home  Did not find out gender in first two pregnancies, feels a little weird about this  Has level II US scheduled  Sister having fertility struggles, slight strain to relationship  Problem List Items Addressed This Visit        Other    Advanced maternal age in multigravida     Had CFDNA - normal  It's a boy!          Prenatal care, subsequent pregnancy, second trimester - Primary

## 2018-02-18 DIAGNOSIS — Z20.828 EXPOSURE TO INFLUENZA: Primary | ICD-10-CM

## 2018-02-18 DIAGNOSIS — Z20.828 EXPOSURE TO INFLUENZA: ICD-10-CM

## 2018-02-18 RX ORDER — OSELTAMIVIR PHOSPHATE 75 MG/1
75 CAPSULE ORAL DAILY
Qty: 10 CAPSULE | Refills: 0 | Status: SHIPPED | OUTPATIENT
Start: 2018-02-18 | End: 2018-02-18 | Stop reason: SDUPTHER

## 2018-02-18 RX ORDER — OSELTAMIVIR PHOSPHATE 75 MG/1
75 CAPSULE ORAL DAILY
Qty: 10 CAPSULE | Refills: 0 | Status: SHIPPED | OUTPATIENT
Start: 2018-02-18 | End: 2018-02-28

## 2018-02-18 NOTE — PROGRESS NOTES
Patient called stating she was exposed to the flu yesterday while visiting her grandfather (he was sick and later got admitted to the hospital and was diagnosed with flu)  She is s/p flu shot  Discussed Tamiflu prophylaxis - ERx sent  Patient aware to call back if she develops symptoms of flu as we would increase her dose of Tamiflu to BID for 5 days  Discussed risks of more severe sequelae of flu for pregnant patients  Encouraged her to call her family doctor or come to the hospital with any more severe symptoms  All questions answered  Patient is comfortable with the plan

## 2018-02-19 LAB
# FETUSES US: 1
AFP ADJ MOM SERPL: 0.75
AFP INTERP SERPL-IMP: NORMAL
AFP SERPL-MCNC: 29.9 NG/ML
DONATED EGG PATIENT QL: NO
GA CLIN EST: 18.7 WEEKS
GA METHOD: NORMAL
HX OF NTD NARR: NO
HX OF TRISOMY 21 NARR: NO
IDDM PATIENT QL: NO
NEURAL TUBE DEFECT RISK FETUS: NORMAL %
SL AMB REPEAT SPECIMEN: NO

## 2018-03-06 ENCOUNTER — ROUTINE PRENATAL (OUTPATIENT)
Dept: PERINATAL CARE | Facility: CLINIC | Age: 38
End: 2018-03-06
Payer: COMMERCIAL

## 2018-03-06 VITALS
HEART RATE: 105 BPM | WEIGHT: 203.4 LBS | HEIGHT: 68 IN | SYSTOLIC BLOOD PRESSURE: 125 MMHG | BODY MASS INDEX: 30.83 KG/M2 | DIASTOLIC BLOOD PRESSURE: 68 MMHG

## 2018-03-06 DIAGNOSIS — Z98.890 HISTORY OF LOOP ELECTRICAL EXCISION PROCEDURE (LEEP): ICD-10-CM

## 2018-03-06 DIAGNOSIS — Z3A.21 21 WEEKS GESTATION OF PREGNANCY: ICD-10-CM

## 2018-03-06 DIAGNOSIS — O09.522 ELDERLY MULTIGRAVIDA IN SECOND TRIMESTER: Primary | ICD-10-CM

## 2018-03-06 DIAGNOSIS — Z34.82 PRENATAL CARE, SUBSEQUENT PREGNANCY, SECOND TRIMESTER: ICD-10-CM

## 2018-03-06 PROBLEM — Z86.79: Status: ACTIVE | Noted: 2018-03-06

## 2018-03-06 PROCEDURE — 76811 OB US DETAILED SNGL FETUS: CPT | Performed by: OBSTETRICS & GYNECOLOGY

## 2018-03-06 PROCEDURE — 76817 TRANSVAGINAL US OBSTETRIC: CPT | Performed by: OBSTETRICS & GYNECOLOGY

## 2018-03-07 NOTE — PROGRESS NOTES
Education  Baby & Me Education 1st Trimester:   First Trimester Education provided:   benefits of breastfeeding, importance of exclusive breastfeeding, early initiation of breastfeeding, exclusive breastfeeding for the first 6 months and Pregnancy Essentials Reference Guide given   The patient is planning on breastfeeding     Prenatal education provided by: Steff Irving RN, [address] [place] [address]      Signatures   Electronically signed by : Ursula Mena RN; Dec 29 2017  9:26AM EST                       (Author)

## 2018-03-15 ENCOUNTER — ROUTINE PRENATAL (OUTPATIENT)
Dept: OBGYN CLINIC | Facility: CLINIC | Age: 38
End: 2018-03-15

## 2018-03-15 VITALS — DIASTOLIC BLOOD PRESSURE: 70 MMHG | SYSTOLIC BLOOD PRESSURE: 124 MMHG | WEIGHT: 206 LBS | BODY MASS INDEX: 31.32 KG/M2

## 2018-03-15 DIAGNOSIS — Z98.890 HISTORY OF LOOP ELECTRICAL EXCISION PROCEDURE (LEEP): ICD-10-CM

## 2018-03-15 DIAGNOSIS — Z34.82 PRENATAL CARE, SUBSEQUENT PREGNANCY, SECOND TRIMESTER: ICD-10-CM

## 2018-03-15 PROCEDURE — PNV: Performed by: PHYSICIAN ASSISTANT

## 2018-03-15 RX ORDER — MULTIVIT-MIN/IRON/FOLIC ACID/K 18-600-40
3000 CAPSULE ORAL
COMMUNITY

## 2018-03-15 NOTE — PROGRESS NOTES
Problem List Items Addressed This Visit     Prenatal care, subsequent pregnancy, second trimester      at 22 4 weeks  Feels well, no complaints  It's a boy  Level II normal, missed view of one kidney - has f/u planned  28 week lab slip given  Will plan to breast feed         Relevant Orders    Glucose, 1H PG    CBC and differential    RPR    History of loop electrical excision procedure (LEEP)

## 2018-03-15 NOTE — ASSESSMENT & PLAN NOTE
at 22 4 weeks  Feels well, no complaints  It's a boy  Level II normal, missed view of one kidney - has f/u planned  28 week lab slip given  Will plan to breast feed

## 2018-03-19 DIAGNOSIS — E55.9 VITAMIN D DEFICIENCY: ICD-10-CM

## 2018-04-16 ENCOUNTER — TRANSCRIBE ORDERS (OUTPATIENT)
Dept: ADMINISTRATIVE | Age: 38
End: 2018-04-16

## 2018-04-16 ENCOUNTER — APPOINTMENT (OUTPATIENT)
Dept: LAB | Age: 38
End: 2018-04-16
Payer: COMMERCIAL

## 2018-04-16 DIAGNOSIS — Z34.82 PRENATAL CARE, SUBSEQUENT PREGNANCY, SECOND TRIMESTER: ICD-10-CM

## 2018-04-16 DIAGNOSIS — E55.9 VITAMIN D DEFICIENCY: ICD-10-CM

## 2018-04-16 LAB
25(OH)D3 SERPL-MCNC: 23.1 NG/ML (ref 30–100)
BASOPHILS # BLD AUTO: 0.02 THOUSANDS/ΜL (ref 0–0.1)
BASOPHILS NFR BLD AUTO: 0 % (ref 0–1)
EOSINOPHIL # BLD AUTO: 0.06 THOUSAND/ΜL (ref 0–0.61)
EOSINOPHIL NFR BLD AUTO: 1 % (ref 0–6)
ERYTHROCYTE [DISTWIDTH] IN BLOOD BY AUTOMATED COUNT: 13.5 % (ref 11.6–15.1)
GLUCOSE 1H P 50 G GLC PO SERPL-MCNC: 92 MG/DL
HCT VFR BLD AUTO: 33.9 % (ref 34.8–46.1)
HGB BLD-MCNC: 11.4 G/DL (ref 11.5–15.4)
LYMPHOCYTES # BLD AUTO: 1.31 THOUSANDS/ΜL (ref 0.6–4.47)
LYMPHOCYTES NFR BLD AUTO: 17 % (ref 14–44)
MCH RBC QN AUTO: 31.3 PG (ref 26.8–34.3)
MCHC RBC AUTO-ENTMCNC: 33.6 G/DL (ref 31.4–37.4)
MCV RBC AUTO: 93 FL (ref 82–98)
MONOCYTES # BLD AUTO: 0.46 THOUSAND/ΜL (ref 0.17–1.22)
MONOCYTES NFR BLD AUTO: 6 % (ref 4–12)
NEUTROPHILS # BLD AUTO: 5.65 THOUSANDS/ΜL (ref 1.85–7.62)
NEUTS SEG NFR BLD AUTO: 76 % (ref 43–75)
NRBC BLD AUTO-RTO: 0 /100 WBCS
PLATELET # BLD AUTO: 205 THOUSANDS/UL (ref 149–390)
PMV BLD AUTO: 10.9 FL (ref 8.9–12.7)
RBC # BLD AUTO: 3.64 MILLION/UL (ref 3.81–5.12)
WBC # BLD AUTO: 7.55 THOUSAND/UL (ref 4.31–10.16)

## 2018-04-16 PROCEDURE — 86592 SYPHILIS TEST NON-TREP QUAL: CPT

## 2018-04-16 PROCEDURE — 82950 GLUCOSE TEST: CPT

## 2018-04-16 PROCEDURE — 36415 COLL VENOUS BLD VENIPUNCTURE: CPT

## 2018-04-16 PROCEDURE — 85025 COMPLETE CBC W/AUTO DIFF WBC: CPT

## 2018-04-16 PROCEDURE — 82306 VITAMIN D 25 HYDROXY: CPT

## 2018-04-17 ENCOUNTER — ROUTINE PRENATAL (OUTPATIENT)
Dept: OBGYN CLINIC | Facility: CLINIC | Age: 38
End: 2018-04-17

## 2018-04-17 VITALS — DIASTOLIC BLOOD PRESSURE: 70 MMHG | WEIGHT: 212 LBS | SYSTOLIC BLOOD PRESSURE: 132 MMHG | BODY MASS INDEX: 32.23 KG/M2

## 2018-04-17 DIAGNOSIS — Z34.92 ENCOUNTER FOR PREGNANCY RELATED EXAMINATION IN SECOND TRIMESTER: ICD-10-CM

## 2018-04-17 DIAGNOSIS — M25.551 PAIN OF BOTH HIP JOINTS: ICD-10-CM

## 2018-04-17 DIAGNOSIS — Z34.93 SUPERVISION OF LOW-RISK PREGNANCY, THIRD TRIMESTER: Primary | ICD-10-CM

## 2018-04-17 DIAGNOSIS — Z3A.27 27 WEEKS GESTATION OF PREGNANCY: ICD-10-CM

## 2018-04-17 DIAGNOSIS — M25.552 PAIN OF BOTH HIP JOINTS: ICD-10-CM

## 2018-04-17 LAB — RPR SER QL: NORMAL

## 2018-04-17 PROCEDURE — PNV: Performed by: NURSE PRACTITIONER

## 2018-04-17 NOTE — LETTER
April 17, 2018     Patient: Nasim Myles   YOB: 1980   Date of Visit: 4/17/2018       To Whom It May Concern: It is my medical opinion that Kem Odell should be excused temporarily from her gym membership due to a pregnancy related condition       If you have any questions or concerns, please don't hesitate to call           Sincerely,        LACI Le    CC: No Recipients

## 2018-04-17 NOTE — PROGRESS NOTES
Problem List Items Addressed This Visit     Supervision of low-risk pregnancy, third trimester - Primary     Denies OB complaints  Good fetal movement  Denies contractions, cramping, leakage of fluid or vaginal bleeding  L2 WNL  F/u is scheduled  S/p flu vaccine  Desires tdap at next visit  Reviewed  labor precautions and FKCs  27 weeks gestation of pregnancy    Pain of both hip joints     C/o bilat hip pain  Occurring late in the day  Worse in certain positions  H/o same in prior pregnancies  Offered PT consult - declined today  Discussed comfort measures  Advised massage and chiropractic medicine is safe in preg  Note provided excusing patient from gym membership              Other Visit Diagnoses     Encounter for pregnancy related examination in second trimester

## 2018-04-17 NOTE — ASSESSMENT & PLAN NOTE
C/o bilat hip pain  Occurring late in the day  Worse in certain positions  H/o same in prior pregnancies  Offered PT consult - declined today  Discussed comfort measures  Advised massage and chiropractic medicine is safe in preg  Note provided excusing patient from gym membership

## 2018-04-17 NOTE — ASSESSMENT & PLAN NOTE
Denies OB complaints  Good fetal movement  Denies contractions, cramping, leakage of fluid or vaginal bleeding  L2 WNL  F/u is scheduled  S/p flu vaccine  Desires tdap at next visit  Reviewed  labor precautions and FKCs

## 2018-04-27 ENCOUNTER — ROUTINE PRENATAL (OUTPATIENT)
Dept: OBGYN CLINIC | Facility: CLINIC | Age: 38
End: 2018-04-27
Payer: COMMERCIAL

## 2018-04-27 VITALS — DIASTOLIC BLOOD PRESSURE: 76 MMHG | BODY MASS INDEX: 32.11 KG/M2 | SYSTOLIC BLOOD PRESSURE: 128 MMHG | WEIGHT: 211.2 LBS

## 2018-04-27 DIAGNOSIS — M25.551 PAIN OF BOTH HIP JOINTS: ICD-10-CM

## 2018-04-27 DIAGNOSIS — Z34.93 ENCOUNTER FOR PREGNANCY RELATED EXAMINATION IN THIRD TRIMESTER: ICD-10-CM

## 2018-04-27 DIAGNOSIS — Z3A.28 28 WEEKS GESTATION OF PREGNANCY: ICD-10-CM

## 2018-04-27 DIAGNOSIS — Z23 NEED FOR TETANUS, DIPHTHERIA, AND ACELLULAR PERTUSSIS (TDAP) VACCINE IN PATIENT OF ADOLESCENT AGE OR OLDER: ICD-10-CM

## 2018-04-27 DIAGNOSIS — Z34.93 SUPERVISION OF LOW-RISK PREGNANCY, THIRD TRIMESTER: Primary | ICD-10-CM

## 2018-04-27 DIAGNOSIS — M25.552 PAIN OF BOTH HIP JOINTS: ICD-10-CM

## 2018-04-27 PROCEDURE — 90715 TDAP VACCINE 7 YRS/> IM: CPT

## 2018-04-27 PROCEDURE — PNV: Performed by: NURSE PRACTITIONER

## 2018-04-27 PROCEDURE — 90471 IMMUNIZATION ADMIN: CPT | Performed by: NURSE PRACTITIONER

## 2018-04-27 NOTE — ASSESSMENT & PLAN NOTE
Denies OB complaints  Good fetal movement  Denies contractions, cramping, leakage of fluid or vaginal bleeding  Tdap administered today  S/p flu vaccine  Baby and Me considerations reinforced  Reviewed  labor precautions and FKCs

## 2018-04-27 NOTE — ASSESSMENT & PLAN NOTE
Pt has had massage therapy and is scheduled to see chiropractor  She has declined pelvic PT consult  Reviewed comfort measures

## 2018-04-27 NOTE — PROGRESS NOTES
Problem List Items Addressed This Visit     Supervision of low-risk pregnancy, third trimester - Primary     Denies OB complaints  Good fetal movement  Denies contractions, cramping, leakage of fluid or vaginal bleeding  Tdap administered today  S/p flu vaccine  Baby and Me considerations reinforced  Reviewed  labor precautions and FKCs  28 weeks gestation of pregnancy    Pain of both hip joints     Pt has had massage therapy and is scheduled to see chiropractor  She has declined pelvic PT consult  Reviewed comfort measures              Other Visit Diagnoses     Encounter for pregnancy related examination in third trimester        Relevant Orders    TDAP Vaccine greater than or equal to 8yo (Completed)    Need for tetanus, diphtheria, and acellular pertussis (Tdap) vaccine in patient of adolescent age or older        Relevant Orders    TDAP Vaccine greater than or equal to 8yo (Completed)

## 2018-05-09 ENCOUNTER — ROUTINE PRENATAL (OUTPATIENT)
Dept: OBGYN CLINIC | Facility: CLINIC | Age: 38
End: 2018-05-09

## 2018-05-09 VITALS — WEIGHT: 214 LBS | DIASTOLIC BLOOD PRESSURE: 74 MMHG | BODY MASS INDEX: 32.54 KG/M2 | SYSTOLIC BLOOD PRESSURE: 126 MMHG

## 2018-05-09 DIAGNOSIS — Z34.93 SUPERVISION OF LOW-RISK PREGNANCY, THIRD TRIMESTER: Primary | ICD-10-CM

## 2018-05-09 PROCEDURE — PNV: Performed by: PHYSICIAN ASSISTANT

## 2018-05-09 NOTE — PROGRESS NOTES
Problem List Items Addressed This Visit     Supervision of low-risk pregnancy, third trimester - Primary     Feels well overall  Tired  Good fetal movement, it's a boy  Bilateral hips hurt, left more than right   Going to chiropractor, declines PT  28 week labs normal  Received TDAP  Still working as a teacher

## 2018-05-09 NOTE — ASSESSMENT & PLAN NOTE
Feels well overall  Tired  Good fetal movement, it's a boy  Bilateral hips hurt, left more than right   Going to chiropractor, declines PT  28 week labs normal  Received TDAP  Still working as a teacher

## 2018-05-22 ENCOUNTER — ULTRASOUND (OUTPATIENT)
Dept: PERINATAL CARE | Facility: CLINIC | Age: 38
End: 2018-05-22
Payer: COMMERCIAL

## 2018-05-22 VITALS
WEIGHT: 216.2 LBS | BODY MASS INDEX: 32.77 KG/M2 | SYSTOLIC BLOOD PRESSURE: 129 MMHG | HEIGHT: 68 IN | DIASTOLIC BLOOD PRESSURE: 74 MMHG | HEART RATE: 101 BPM

## 2018-05-22 DIAGNOSIS — Z34.82 PRENATAL CARE, SUBSEQUENT PREGNANCY, SECOND TRIMESTER: ICD-10-CM

## 2018-05-22 DIAGNOSIS — Z3A.32 32 WEEKS GESTATION OF PREGNANCY: ICD-10-CM

## 2018-05-22 DIAGNOSIS — O09.523 ELDERLY MULTIGRAVIDA, THIRD TRIMESTER: Primary | ICD-10-CM

## 2018-05-22 PROCEDURE — 76816 OB US FOLLOW-UP PER FETUS: CPT | Performed by: OBSTETRICS & GYNECOLOGY

## 2018-05-22 PROCEDURE — 99212 OFFICE O/P EST SF 10 MIN: CPT | Performed by: OBSTETRICS & GYNECOLOGY

## 2018-05-22 NOTE — LETTER
May 22, 2018     Paresh Ramirez, [address]    Patient: Ulices Matthews   YOB: 1980   Date of Visit: 5/22/2018       Dear Dr Melissa Rollins: Thank you for referring Trinity Weldon to me for evaluation  Below are my notes for this consultation  If you have questions, please do not hesitate to call me  I look forward to following your patient along with you  Sincerely,        Abimbola Tadeo MD        CC: No Recipients  Abimbola Tadeo MD  5/22/2018  5:15 PM  Sign at close encounter  Please refer to the [facility] ultrasound report in Ob Procedures for additional information regarding the visit to the [facility]  today

## 2018-05-22 NOTE — PROGRESS NOTES
Please refer to the [facility] ultrasound report in Ob Procedures for additional information regarding the visit to the [facility]  today

## 2018-05-23 ENCOUNTER — ROUTINE PRENATAL (OUTPATIENT)
Dept: OBGYN CLINIC | Facility: CLINIC | Age: 38
End: 2018-05-23

## 2018-05-23 VITALS — DIASTOLIC BLOOD PRESSURE: 81 MMHG | BODY MASS INDEX: 33.15 KG/M2 | WEIGHT: 218 LBS | SYSTOLIC BLOOD PRESSURE: 121 MMHG

## 2018-05-23 DIAGNOSIS — M25.552 PAIN OF BOTH HIP JOINTS: ICD-10-CM

## 2018-05-23 DIAGNOSIS — M25.551 PAIN OF BOTH HIP JOINTS: ICD-10-CM

## 2018-05-23 DIAGNOSIS — Z34.93 SUPERVISION OF LOW-RISK PREGNANCY, THIRD TRIMESTER: Primary | ICD-10-CM

## 2018-05-23 PROCEDURE — PNV: Performed by: OBSTETRICS & GYNECOLOGY

## 2018-05-23 NOTE — PROGRESS NOTES
Problem List Items Addressed This Visit        Other    Supervision of low-risk pregnancy, third trimester     Had normal growth scan, no further US scheduled  Checking in card given  S/P TDAP  Has breast pump             Pain of both hip joints - Primary

## 2018-06-05 ENCOUNTER — ROUTINE PRENATAL (OUTPATIENT)
Dept: OBGYN CLINIC | Facility: CLINIC | Age: 38
End: 2018-06-05

## 2018-06-05 VITALS — WEIGHT: 217.6 LBS | DIASTOLIC BLOOD PRESSURE: 70 MMHG | BODY MASS INDEX: 33.09 KG/M2 | SYSTOLIC BLOOD PRESSURE: 124 MMHG

## 2018-06-05 DIAGNOSIS — Z3A.34 34 WEEKS GESTATION OF PREGNANCY: Primary | ICD-10-CM

## 2018-06-05 PROCEDURE — PNV: Performed by: OBSTETRICS & GYNECOLOGY

## 2018-06-22 ENCOUNTER — ROUTINE PRENATAL (OUTPATIENT)
Dept: OBGYN CLINIC | Facility: CLINIC | Age: 38
End: 2018-06-22

## 2018-06-22 VITALS — SYSTOLIC BLOOD PRESSURE: 122 MMHG | DIASTOLIC BLOOD PRESSURE: 74 MMHG | BODY MASS INDEX: 33.6 KG/M2 | WEIGHT: 221 LBS

## 2018-06-22 DIAGNOSIS — Z34.93 SUPERVISION OF LOW-RISK PREGNANCY, THIRD TRIMESTER: ICD-10-CM

## 2018-06-22 DIAGNOSIS — Z34.93 ENCOUNTER FOR PREGNANCY RELATED EXAMINATION IN THIRD TRIMESTER: Primary | ICD-10-CM

## 2018-06-22 DIAGNOSIS — Z3A.36 36 WEEKS GESTATION OF PREGNANCY: ICD-10-CM

## 2018-06-22 PROCEDURE — PNV: Performed by: NURSE PRACTITIONER

## 2018-06-22 PROCEDURE — 87653 STREP B DNA AMP PROBE: CPT | Performed by: NURSE PRACTITIONER

## 2018-06-22 NOTE — PROGRESS NOTES
Problem List Items Addressed This Visit     Supervision of low-risk pregnancy, third trimester     Denies OB complaints  Good fetal movement  Denies contractions, cramping, leakage of fluid or vaginal bleeding  GBS collected today  Baby and Me considerations reinforced  Reviewed labor precautions and FKCs              36 weeks gestation of pregnancy      Other Visit Diagnoses     Encounter for pregnancy related examination in third trimester    -  Primary    Relevant Orders    Strep B DNA probe, amplification

## 2018-06-22 NOTE — ASSESSMENT & PLAN NOTE
Denies OB complaints  Good fetal movement  Denies contractions, cramping, leakage of fluid or vaginal bleeding  GBS collected today  Baby and Me considerations reinforced  Reviewed labor precautions and FKCs

## 2018-06-24 LAB — GP B STREP DNA SPEC QL NAA+PROBE: NORMAL

## 2018-06-28 ENCOUNTER — ROUTINE PRENATAL (OUTPATIENT)
Dept: OBGYN CLINIC | Facility: CLINIC | Age: 38
End: 2018-06-28

## 2018-06-28 VITALS — SYSTOLIC BLOOD PRESSURE: 120 MMHG | DIASTOLIC BLOOD PRESSURE: 74 MMHG | BODY MASS INDEX: 33.6 KG/M2 | WEIGHT: 221 LBS

## 2018-06-28 DIAGNOSIS — O09.523 ELDERLY MULTIGRAVIDA IN THIRD TRIMESTER: ICD-10-CM

## 2018-06-28 PROBLEM — O09.529 ADVANCED MATERNAL AGE IN MULTIGRAVIDA: Status: RESOLVED | Noted: 2018-01-19 | Resolved: 2018-06-28

## 2018-06-28 PROBLEM — Z3A.36 36 WEEKS GESTATION OF PREGNANCY: Status: RESOLVED | Noted: 2018-04-17 | Resolved: 2018-06-28

## 2018-06-28 PROBLEM — Z34.93 SUPERVISION OF LOW-RISK PREGNANCY, THIRD TRIMESTER: Status: RESOLVED | Noted: 2018-04-17 | Resolved: 2018-06-28

## 2018-06-28 PROCEDURE — PNV: Performed by: PHYSICIAN ASSISTANT

## 2018-06-28 NOTE — PROGRESS NOTES
Problem List Items Addressed This Visit     Elderly multigravida in third trimester     Feels well overall  Good fetal movement  Its a boy  Inconsistent ctx  GBS neg

## 2018-07-05 ENCOUNTER — ROUTINE PRENATAL (OUTPATIENT)
Dept: OBGYN CLINIC | Facility: CLINIC | Age: 38
End: 2018-07-05

## 2018-07-05 VITALS — SYSTOLIC BLOOD PRESSURE: 118 MMHG | WEIGHT: 221 LBS | DIASTOLIC BLOOD PRESSURE: 64 MMHG | BODY MASS INDEX: 33.6 KG/M2

## 2018-07-05 DIAGNOSIS — Z98.890 HISTORY OF LOOP ELECTRICAL EXCISION PROCEDURE (LEEP): ICD-10-CM

## 2018-07-05 DIAGNOSIS — O09.523 ELDERLY MULTIGRAVIDA IN THIRD TRIMESTER: ICD-10-CM

## 2018-07-05 PROCEDURE — PNV: Performed by: PHYSICIAN ASSISTANT

## 2018-07-05 NOTE — PROGRESS NOTES
Problem List Items Addressed This Visit     Elderly multigravida in third trimester     Feels well overall  Good fetal movement  Its a boy  GBS neg  Ready to deliver         History of loop electrical excision procedure (LEEP)

## 2018-07-12 ENCOUNTER — ROUTINE PRENATAL (OUTPATIENT)
Dept: OBGYN CLINIC | Facility: CLINIC | Age: 38
End: 2018-07-12

## 2018-07-12 VITALS — BODY MASS INDEX: 33.85 KG/M2 | WEIGHT: 222.6 LBS | SYSTOLIC BLOOD PRESSURE: 126 MMHG | DIASTOLIC BLOOD PRESSURE: 70 MMHG

## 2018-07-12 DIAGNOSIS — Z34.83 PRENATAL CARE, SUBSEQUENT PREGNANCY, THIRD TRIMESTER: Primary | ICD-10-CM

## 2018-07-12 PROCEDURE — PNV: Performed by: OBSTETRICS & GYNECOLOGY

## 2018-07-12 NOTE — PROGRESS NOTES
Problem List Items Addressed This Visit        Other    Prenatal care, subsequent pregnancy, third trimester - Primary     Patient has no complaints has had no contractions   normal fetal movement   discussed pregnancy care after her due date

## 2018-07-12 NOTE — ASSESSMENT & PLAN NOTE
Patient has no complaints has had no contractions   normal fetal movement   discussed pregnancy care after her due date

## 2018-07-16 ENCOUNTER — ROUTINE PRENATAL (OUTPATIENT)
Dept: OBGYN CLINIC | Facility: CLINIC | Age: 38
End: 2018-07-16

## 2018-07-16 VITALS — DIASTOLIC BLOOD PRESSURE: 66 MMHG | SYSTOLIC BLOOD PRESSURE: 116 MMHG | WEIGHT: 223 LBS | BODY MASS INDEX: 33.91 KG/M2

## 2018-07-16 DIAGNOSIS — Z3A.40 40 WEEKS GESTATION OF PREGNANCY: Primary | ICD-10-CM

## 2018-07-16 DIAGNOSIS — Z34.83 PRENATAL CARE, SUBSEQUENT PREGNANCY, THIRD TRIMESTER: ICD-10-CM

## 2018-07-16 PROCEDURE — PNV: Performed by: NURSE PRACTITIONER

## 2018-07-16 NOTE — ASSESSMENT & PLAN NOTE
Denies OB complaints  Good fetal movement  Denies contractions, cramping, leakage of fluid or vaginal bleeding  Offered membrane sweep and this was declined  Discussed IOL if still pregnant at 36 weeks  Baby and Me considerations reinforced  Reviewed labor precautions and FKCs

## 2018-07-16 NOTE — PROGRESS NOTES
Problem List Items Addressed This Visit     Prenatal care, subsequent pregnancy, third trimester     Denies OB complaints  Good fetal movement  Denies contractions, cramping, leakage of fluid or vaginal bleeding  Offered membrane sweep and this was declined  Discussed IOL if still pregnant at 36 weeks  Baby and Me considerations reinforced  Reviewed labor precautions and FKCs              40 weeks gestation of pregnancy - Primary

## 2018-07-21 ENCOUNTER — HOSPITAL ENCOUNTER (INPATIENT)
Facility: HOSPITAL | Age: 38
LOS: 2 days | Discharge: HOME | End: 2018-07-23
Attending: OBSTETRICS & GYNECOLOGY | Admitting: OBSTETRICS & GYNECOLOGY
Payer: COMMERCIAL

## 2018-07-21 DIAGNOSIS — Z3A.40 40 WEEKS GESTATION OF PREGNANCY: Primary | ICD-10-CM

## 2018-07-21 LAB
ABO GROUP BLD: NORMAL
BASOPHILS # BLD AUTO: 0.02 THOUSANDS/ΜL (ref 0–0.1)
BASOPHILS NFR BLD AUTO: 0 % (ref 0–1)
BLD GP AB SCN SERPL QL: NEGATIVE
EOSINOPHIL # BLD AUTO: 0.09 THOUSAND/ΜL (ref 0–0.61)
EOSINOPHIL NFR BLD AUTO: 1 % (ref 0–6)
ERYTHROCYTE [DISTWIDTH] IN BLOOD BY AUTOMATED COUNT: 13.7 % (ref 11.6–15.1)
HCT VFR BLD AUTO: 33.8 % (ref 34.8–46.1)
HGB BLD-MCNC: 10.9 G/DL (ref 11.5–15.4)
IMM GRANULOCYTES # BLD AUTO: 0.1 THOUSAND/UL (ref 0–0.2)
IMM GRANULOCYTES NFR BLD AUTO: 1 % (ref 0–2)
LYMPHOCYTES # BLD AUTO: 1.72 THOUSANDS/ΜL (ref 0.6–4.47)
LYMPHOCYTES NFR BLD AUTO: 17 % (ref 14–44)
MCH RBC QN AUTO: 29.5 PG (ref 26.8–34.3)
MCHC RBC AUTO-ENTMCNC: 32.2 G/DL (ref 31.4–37.4)
MCV RBC AUTO: 92 FL (ref 82–98)
MONOCYTES # BLD AUTO: 0.7 THOUSAND/ΜL (ref 0.17–1.22)
MONOCYTES NFR BLD AUTO: 7 % (ref 4–12)
NEUTROPHILS # BLD AUTO: 7.4 THOUSANDS/ΜL (ref 1.85–7.62)
NEUTS SEG NFR BLD AUTO: 74 % (ref 43–75)
NRBC BLD AUTO-RTO: 0 /100 WBCS
PLATELET # BLD AUTO: 206 THOUSANDS/UL (ref 149–390)
PMV BLD AUTO: 11.4 FL (ref 8.9–12.7)
RBC # BLD AUTO: 3.69 MILLION/UL (ref 3.81–5.12)
RH BLD: POSITIVE
SPECIMEN EXPIRATION DATE: NORMAL
WBC # BLD AUTO: 10.03 THOUSAND/UL (ref 4.31–10.16)

## 2018-07-21 PROCEDURE — 0KQM0ZZ REPAIR PERINEUM MUSCLE, OPEN APPROACH: ICD-10-PCS | Performed by: OBSTETRICS & GYNECOLOGY

## 2018-07-21 PROCEDURE — 86901 BLOOD TYPING SEROLOGIC RH(D): CPT | Performed by: OBSTETRICS & GYNECOLOGY

## 2018-07-21 PROCEDURE — 86900 BLOOD TYPING SEROLOGIC ABO: CPT | Performed by: OBSTETRICS & GYNECOLOGY

## 2018-07-21 PROCEDURE — 86592 SYPHILIS TEST NON-TREP QUAL: CPT | Performed by: OBSTETRICS & GYNECOLOGY

## 2018-07-21 PROCEDURE — 85025 COMPLETE CBC W/AUTO DIFF WBC: CPT | Performed by: OBSTETRICS & GYNECOLOGY

## 2018-07-21 PROCEDURE — 99214 OFFICE O/P EST MOD 30 MIN: CPT

## 2018-07-21 PROCEDURE — 86850 RBC ANTIBODY SCREEN: CPT | Performed by: OBSTETRICS & GYNECOLOGY

## 2018-07-21 RX ORDER — BUTORPHANOL TARTRATE 1 MG/ML
1 INJECTION, SOLUTION INTRAMUSCULAR; INTRAVENOUS ONCE
Status: COMPLETED | OUTPATIENT
Start: 2018-07-21 | End: 2018-07-21

## 2018-07-21 RX ORDER — SODIUM CHLORIDE, SODIUM LACTATE, POTASSIUM CHLORIDE, CALCIUM CHLORIDE 600; 310; 30; 20 MG/100ML; MG/100ML; MG/100ML; MG/100ML
125 INJECTION, SOLUTION INTRAVENOUS CONTINUOUS
Status: DISCONTINUED | OUTPATIENT
Start: 2018-07-21 | End: 2018-07-22

## 2018-07-21 RX ADMIN — SODIUM CHLORIDE, SODIUM LACTATE, POTASSIUM CHLORIDE, AND CALCIUM CHLORIDE 125 ML/HR: .6; .31; .03; .02 INJECTION, SOLUTION INTRAVENOUS at 21:28

## 2018-07-21 RX ADMIN — SODIUM CHLORIDE, SODIUM LACTATE, POTASSIUM CHLORIDE, AND CALCIUM CHLORIDE 125 ML/HR: .6; .31; .03; .02 INJECTION, SOLUTION INTRAVENOUS at 22:00

## 2018-07-21 RX ADMIN — BUTORPHANOL TARTRATE 1 MG: 1 INJECTION, SOLUTION INTRAMUSCULAR; INTRAVENOUS at 22:46

## 2018-07-22 ENCOUNTER — ANESTHESIA EVENT (INPATIENT)
Dept: LABOR AND DELIVERY | Facility: HOSPITAL | Age: 38
End: 2018-07-22
Payer: COMMERCIAL

## 2018-07-22 ENCOUNTER — ANESTHESIA (INPATIENT)
Dept: LABOR AND DELIVERY | Facility: HOSPITAL | Age: 38
End: 2018-07-22
Payer: COMMERCIAL

## 2018-07-22 LAB
BASE EXCESS BLDCOA CALC-SCNC: -4 MMOL/L (ref 3–11)
BASE EXCESS BLDCOV CALC-SCNC: -4.6 MMOL/L (ref 1–9)
HCO3 BLDCOA-SCNC: 23.8 MMOL/L (ref 17.3–27.3)
HCO3 BLDCOV-SCNC: 21.2 MMOL/L (ref 12.2–28.6)
O2 CT VFR BLDCOA CALC: 6.8 ML/DL
OXYHGB MFR BLDCOA: 32.1 %
OXYHGB MFR BLDCOV: 57 %
PCO2 BLDCOA: 54 MM[HG] (ref 30–60)
PCO2 BLDCOV: 41.8 MM HG (ref 27–43)
PH BLDCOA: 7.26 [PH] (ref 7.23–7.43)
PH BLDCOV: 7.32 [PH] (ref 7.19–7.49)
PO2 BLDCOA: 18.6 MM HG (ref 5–25)
PO2 BLDCOV: 25.7 MM HG (ref 15–45)
SAO2 % BLDCOV: 12.4 ML/DL

## 2018-07-22 PROCEDURE — 82805 BLOOD GASES W/O2 SATURATION: CPT | Performed by: OBSTETRICS & GYNECOLOGY

## 2018-07-22 PROCEDURE — 59400 OBSTETRICAL CARE: CPT | Performed by: OBSTETRICS & GYNECOLOGY

## 2018-07-22 RX ORDER — DIAPER,BRIEF,INFANT-TODD,DISP
1 EACH MISCELLANEOUS AS NEEDED
Status: DISCONTINUED | OUTPATIENT
Start: 2018-07-22 | End: 2018-07-23 | Stop reason: HOSPADM

## 2018-07-22 RX ORDER — ONDANSETRON 2 MG/ML
4 INJECTION INTRAMUSCULAR; INTRAVENOUS EVERY 8 HOURS PRN
Status: DISCONTINUED | OUTPATIENT
Start: 2018-07-22 | End: 2018-07-23 | Stop reason: HOSPADM

## 2018-07-22 RX ORDER — ACETAMINOPHEN 325 MG/1
650 TABLET ORAL EVERY 6 HOURS PRN
Status: DISCONTINUED | OUTPATIENT
Start: 2018-07-22 | End: 2018-07-23 | Stop reason: HOSPADM

## 2018-07-22 RX ORDER — OXYCODONE HYDROCHLORIDE AND ACETAMINOPHEN 5; 325 MG/1; MG/1
1 TABLET ORAL EVERY 4 HOURS PRN
Status: DISCONTINUED | OUTPATIENT
Start: 2018-07-22 | End: 2018-07-23 | Stop reason: HOSPADM

## 2018-07-22 RX ORDER — OXYCODONE HYDROCHLORIDE AND ACETAMINOPHEN 5; 325 MG/1; MG/1
2 TABLET ORAL EVERY 4 HOURS PRN
Status: DISCONTINUED | OUTPATIENT
Start: 2018-07-22 | End: 2018-07-23 | Stop reason: HOSPADM

## 2018-07-22 RX ORDER — DOCUSATE SODIUM 100 MG/1
100 CAPSULE, LIQUID FILLED ORAL 2 TIMES DAILY
Status: DISCONTINUED | OUTPATIENT
Start: 2018-07-22 | End: 2018-07-23 | Stop reason: HOSPADM

## 2018-07-22 RX ORDER — OXYTOCIN/RINGER'S LACTATE 30/500 ML
PLASTIC BAG, INJECTION (ML) INTRAVENOUS
Status: COMPLETED
Start: 2018-07-22 | End: 2018-07-22

## 2018-07-22 RX ORDER — IBUPROFEN 600 MG/1
600 TABLET ORAL EVERY 6 HOURS PRN
Status: DISCONTINUED | OUTPATIENT
Start: 2018-07-22 | End: 2018-07-23 | Stop reason: HOSPADM

## 2018-07-22 RX ORDER — LIDOCAINE HYDROCHLORIDE 10 MG/ML
INJECTION, SOLUTION EPIDURAL; INFILTRATION; INTRACAUDAL; PERINEURAL
Status: COMPLETED
Start: 2018-07-22 | End: 2018-07-22

## 2018-07-22 RX ORDER — LIDOCAINE HYDROCHLORIDE 10 MG/ML
INJECTION, SOLUTION EPIDURAL; INFILTRATION; INTRACAUDAL; PERINEURAL
Status: DISPENSED
Start: 2018-07-22 | End: 2018-07-22

## 2018-07-22 RX ORDER — CALCIUM CARBONATE 200(500)MG
1000 TABLET,CHEWABLE ORAL DAILY PRN
Status: DISCONTINUED | OUTPATIENT
Start: 2018-07-22 | End: 2018-07-23 | Stop reason: HOSPADM

## 2018-07-22 RX ADMIN — IBUPROFEN 600 MG: 600 TABLET ORAL at 01:32

## 2018-07-22 RX ADMIN — DOCUSATE SODIUM 100 MG: 100 CAPSULE, LIQUID FILLED ORAL at 08:16

## 2018-07-22 RX ADMIN — LIDOCAINE HYDROCHLORIDE 25 MG: 10 INJECTION, SOLUTION EPIDURAL; INFILTRATION; INTRACAUDAL; PERINEURAL at 00:48

## 2018-07-22 RX ADMIN — Medication 30 UNITS: at 00:45

## 2018-07-22 RX ADMIN — BENZOCAINE AND LEVOMENTHOL: 200; 5 SPRAY TOPICAL at 01:32

## 2018-07-22 RX ADMIN — ACETAMINOPHEN 650 MG: 325 TABLET, FILM COATED ORAL at 12:34

## 2018-07-22 RX ADMIN — WITCH HAZEL 1 PAD: 500 SOLUTION RECTAL; TOPICAL at 01:32

## 2018-07-22 RX ADMIN — IBUPROFEN 600 MG: 600 TABLET ORAL at 08:16

## 2018-07-22 RX ADMIN — DOCUSATE SODIUM 100 MG: 100 CAPSULE, LIQUID FILLED ORAL at 18:32

## 2018-07-22 RX ADMIN — IBUPROFEN 600 MG: 600 TABLET ORAL at 18:32

## 2018-07-22 NOTE — L&D DELIVERY NOTE
Delivery Summary - OB/GYN   Susana Early 41 y o  female MRN: [id number]  Unit/Bed#: -01 Encounter: [id number]    Pre-delivery Diagnosis:   1  41w0d pregnancy  2  Labor  3  Advanced maternal age  3  Anxiety  5  History of LEEP    Post-delivery Diagnosis: same, delivered    Attending: Dr Agustina French     Assistant(s): Dr Antionette Moore    Procedure: , repair of second-degree perineal laceration    Anesthesia: Local    Estimated Blood Loss:  300 mL    Specimens:   1  Arterial and venous cord gases  2  Cord blood  3  Segment of umbilical cord  4  Placenta to storage     Complications:  None apparent    Findings:  1  Viable male  delivered on 18 at 0044 weight pending;  Apgar scores of 9 at one minute and 9 at five minutes  2  Spontaneous delivery of placenta with centrally inserted 3-vessel cord  3  Clear amniotic fluid  4  2nd degree perineal laceration, repaired with 3-0Vicryl       Disposition: Patient tolerated the procedure well and was recovering in labor and delivery room with family and  before being transferred to the post-partum floor  Procedure Details     Description of procedure  Owen Joel is a 45-year-old  who was initially admitted for spontaneous labor  She received 1 dose of Stadol for analgesia and labored without further intervention or complication  She spontaneously ruptured for clear fluid at 0024 and was found to be complete at 0029  After pushing for 15 minutes, on 18 at 0044 patient delivered a viable male , weight pending, Apgars of 9 (1 min) and 9 (5 min)  The fetal vertex delivered spontaneously  There was no nuchal cord  The anterior shoulder delivered atraumatically with maternal expulsive forces and the assistance of downward traction  The posterior shoulder delivered with maternal expulsive forces and the assistance of upward traction  The remainder of the fetus delivered spontaneously       Upon delivery, the infant was placed on the mothers abdomen and the cord was clamped and cut  The infant was noted to cry spontaneously and was moving all extremities appropriately  There was no evidence for injury  Awaiting nurse resuscitators evaluated the  at bedside  Arterial and venous cord blood gases and cord blood was collected for analysis  These were promptly sent to the lab  In the immediate post-partum, 30 units of IV pitocin was administered and the uterus was noted to contract down well with massage and pitocin  The placenta delivered spontaneously at 0048 and was noted to have a centrally inserted 3 vessel cord  The vagina, cervix, and perineum were inspected and there was noted to be a second-degree perineal laceration  Laceration Repair  Patient received local anesthetic prior to repair  A second-degree laceration was identified and required repair  Laceration was repaired with 3-0 Vicryl in standard fashion to reapproximate the laceration  Good hemostasis was confirmed at the conclusion of this procedure  At the conclusion of the delivery, all needle, sponge, and instrument counts were noted to be correct  Patient tolerated the procedure well and was allowed to recover in labor and delivery room with family and  before being transferred to the post-partum floor  Dr Tirso Gaspar was present and participated in all key portions of the case      Dayami Will MD, MPH  OB/GYN, PGY3  2018, 1:38 AM

## 2018-07-22 NOTE — LACTATION NOTE
This note was copied from a baby's chart  Mmo states infant is feeding well so far  Infant observed at breast in cradle hold  Minor re-positioning suggested for a closer latch  Reviewed signs of proper postioning and latch  Given admission breastfeeding pkat  Encouraged feeding on cue  Discussed normal  infant feeding patterns in the first few days  Encouraged to call for additional assistance as needed

## 2018-07-22 NOTE — OB LABOR/OXYTOCIN SAFETY PROGRESS
Labor Progress Note - Nasim Myles 41 y o  female MRN: [id number]    Unit/Bed#: -01 Encounter: [id number]    Obstetric History       T2      L2     SAB0   TAB0   Ectopic0   Multiple0   Live Births2    Obstetric Comments   Advanced maternal age in multigravida - last assessed -    H/O delivery by vacuum extraction, currently pregnant - last assessed - 2018   Antepartum hemorrhage, antepartum - Resolved - 2015   History of vacuum extraction assisted delivery -  daughter     Gestational Age: 38w9d               Dilation: 6        Effacement (%): 80  Station: -1  Baseline Rate: 135 bpm                Notes/comments:      Resident called because patient feeling increased discomfort      SVE performed, as above    Continuing expectant management       Barrie Pacheco MD 2018 10:12 PM

## 2018-07-22 NOTE — DISCHARGE INSTRUCTIONS
Vaginal Delivery   WHAT YOU SHOULD KNOW:   A vaginal delivery is the birth of your baby through your vagina (birth canal)  AFTER YOU LEAVE:   Medicines:  · NSAIDs  help decrease swelling and pain or fever  This medicine is available with or without a doctor's order  NSAIDs can cause stomach bleeding or kidney problems in certain people  If you take blood thinner medicine, always ask your healthcare provider if NSAIDs are safe for you  Always read the medicine label and follow directions  · Take your medicine as directed  Call your healthcare provider if you think your medicine is not helping or if you have side effects  Tell him if you are allergic to any medicine  Keep a list of the medicines, vitamins, and herbs you take  Include the amounts, and when and why you take them  Bring the list or the pill bottles to follow-up visits  Carry your medicine list with you in case of an emergency  Follow up with your primary healthcare provider:  Most women need to return 6 weeks after a vaginal delivery  Ask about how to care for your wounds or stitches  Write down your questions so you remember to ask them during your visits  Activity:  Rest as much as possible  Try to keep all activities short  You may be able to do some exercise soon after you have your baby  Talk with your primary healthcare provider before you start exercising  If you work outside the home, ask when you can return to your job  Kegel exercises:  Kegel exercises may help your vaginal and rectal muscles heal faster  You can do Kegel exercises by tightening and relaxing the muscles around your vagina  Kegel exercises help make the muscles stronger  Breast care:  When your milk comes in, your breasts may feel full and hard  Ask how to care for your breasts, even if you are not breastfeeding  Constipation:  Do not try to push the bowel movement out if it is too hard   High-fiber foods, extra liquids, and regular exercise can help you prevent constipation  Examples of high-fiber foods are fruit and bran  Prune juice and water are good liquids to drink  Regular exercise helps your digestive system work  You may also be told to take over-the-counter fiber and stool softener medicines  Take these items as directed  Hemorrhoids:  Pregnancy can cause severe hemorrhoids  You may have rectal pain because of the hemorrhoids  Ask how to prevent or treat hemorrhoids  Perineum care: Your perineum is the area between your vagina and anus  Keep the area clean and dry to help it heal and to prevent infection  Wash the area gently with soap and water when you bathe or shower  Rinse your perineum with warm water when you use the toilet  Your primary healthcare provider may suggest you use a warm sitz bath to help decrease pain  A sitz bath is a bathtub or basin filled to hip level  Stay in the sitz bath for 20 to 30 minutes, or as directed  Vaginal discharge: You will have vaginal discharge, called lochia, after your delivery  The lochia is bright red the first day or two after the birth  By the fourth day, the amount decreases, and it turns red-brown  Use a sanitary pad rather than a tampon to prevent a vaginal infection  It is normal to have lochia up to 8 weeks after your baby is born  Monthly periods: Your period may start again within 7 to 12 weeks after your baby is born  If you are breastfeeding, it may take longer for your period to start again  You can still get pregnant again even though you do not have your monthly period  Talk with your primary healthcare provider about a birth control method that will be good for you if you do not want to get pregnant  Mood changes: Many new mothers have some kind of mood changes after delivery  Some of these changes occur because of lack of sleep, hormone changes, and caring for a new baby  Some mood changes can be more serious, such as postpartum depression   Talk with your primary healthcare provider if you feel unable to care for yourself or your baby  Sexual activity:  You may need to avoid sex for 6 to 7 weeks after you have your baby  You may notice you have a decreased desire for sex, or sex may be painful  You may need to use a vaginal lubricant (gel) to help make sex more comfortable  Contact your primary healthcare provider if:   · You have heavy vaginal bleeding that fills 1 or more sanitary pads in 1 hour  · You have a fever  · Your pain does not go away, or gets worse  · The skin between your vagina and rectum is swollen, warm, or red  · You have swollen, hard, or painful breasts  · You feel very sad or depressed  · You feel more tired than usual      · You have questions or concerns about your condition or care  Seek care immediately or call 911 if:   · You have pus or yellow drainage coming from your vagina or wound  · You are urinating very little, or not at all  · Your arm or leg feels warm, tender, and painful  It may look swollen and red  · You feel lightheaded, have sudden and worsening chest pain, or trouble breathing  You may have more pain when you take deep breaths or cough, or you may cough up blood  [address] is for End User's use only and may not be sold, redistributed or otherwise used for commercial purposes  All illustrations and images included in CareNotes® are the copyrighted property of A D [employer] M [employer]  or [employer]  The above information is an  only  It is not intended as medical advice for individual conditions or treatments  Talk to your doctor, nurse or pharmacist before following any medical regimen to see if it is safe and effective for you  Postpartum Bleeding   WHAT YOU NEED TO KNOW:   Postpartum bleeding is vaginal bleeding after childbirth  This bleeding is normal, whether your baby was born vaginally or by    It contains blood and the tissue that lined the inside of your uterus when you were pregnant  DISCHARGE INSTRUCTIONS:   What to expect with postpartum bleeding:  Postpartum bleeding usually lasts at least 10 days, and may last longer than 6 weeks  Your bleeding may range from light (barely staining a pad) to heavy (soaking a pad in 1 hour)  Usually, you have heavier bleeding right after childbirth, which slows over the next few weeks until it stops  The bleeding is red or dark brown with clots for the first 1 to 3 days  It then turns pink for several days, and then becomes a white or yellow discharge until it ends  Follow up with your obstetrician as directed:  Do not have sex until your obstetrician says it is okay  Write down your questions so you remember to ask them during your visits  Contact your healthcare provider or obstetrician if:   · Your bleeding increases, or you have heavy bleeding that soaks a pad in 1 hour for 2 hours in a row  · You pass large blood clots  · You are breathing faster than normal, or your heart is beating faster than normal     · You are urinating less than usual, or not at all  · You feel dizzy  · You have questions or concerns about your condition or care  Seek immediate care or call 911 if:   · You are suddenly short of breath and feel lightheaded  · You have sudden chest pain  © 2017 [address]  Information is for End User's use only and may not be sold, redistributed or otherwise used for commercial purposes  All illustrations and images included in CareNotes® are the copyrighted property of [employer] M [employer]  or [employer]  The above information is an  only  It is not intended as medical advice for individual conditions or treatments  Talk to your doctor, nurse or pharmacist before following any medical regimen to see if it is safe and effective for you  Postpartum Depression   WHAT YOU NEED TO KNOW:   What is postpartum depression?   Postpartum depression is a mood disorder that occurs after giving birth  A mood is an emotion or a feeling  Moods affect your behavior and how you feel about yourself and life in general  Depression is a sad mood that you cannot control  Women often feel sad, afraid, or nervous after their baby is born  These feelings are called postpartum blues or baby blues, and they usually go away in 1 to 2 weeks  With postpartum depression, these symptoms get worse and continue for more than 2 weeks  Postpartum depression is a serious condition that affects your daily activities and relationships  What causes postpartum depression? Healthcare providers do not know exactly what causes postpartum depression  It may be caused by a sudden drop in hormone levels after childbirth  A previous episode of postpartum depression or a family history of depression may increase your risk  Several things may trigger postpartum depression:  · Lack of support from the baby's father or other family members    · Feeling more tired than usual    · Stress, a poor diet, or lack of sleep    · Pain after childbirth or pain during breastfeeding    · Sudden change in lifestyle  How is postpartum depression diagnosed? Postpartum depression affects your daily activities and your relationships with other people  Healthcare providers will ask you questions about your signs and symptoms and how they are affecting your life  The symptoms of postpartum depression usually begin within 1 month after childbirth  You feel depressed or lose interest in activities you enjoy nearly every day for at least 2 weeks  You also have 4 or more of the following symptoms:  · You feel tired or have less energy than usual      · You feel unimportant or guilty most of the time  · You think about hurting or killing yourself  · Your appetite changes  You may lose your appetite and lose weight without trying  Your appetite may also increase and you may gain weight      · You are restless, irritable, or withdrawn  · You have trouble concentrating and remembering things  You have trouble doing daily tasks or making decisions  · You have trouble sleeping, even after the baby is asleep  How is postpartum depression treated? · Psychotherapy:  During therapy, you will talk with healthcare providers about how to cope with your feelings and moods  This can be done alone or in a group  It may also be done with family members or your partner  · Antidepressants: This medicine is given to decrease or stop the symptoms of depression  You usually need to take antidepressants for several weeks before you begin to feel better  Do not stop taking antidepressants unless your healthcare provider tells you to  Healthcare providers may try a different antidepressant if one type does not work  What can I do to feel better? · Rest:  Do not try to do everything all at the same time  Do only what is needed and let other things wait until later  Ask your family or friends for help, especially if you have other children  Ask your partner to help with night feedings or other baby care  Try to sleep when the baby naps  · Get emotional support:  Share your feelings with your partner, a friend, or another mother  · Take care of yourself:  Shower and dress each day  Do not skip meals  Try to get out of the house a little each day  Get regular exercise  Eat a healthy diet  Avoid alcohol because it can make your depression worse  Do not isolate yourself  Go for a walk or meet with a friend  It is also important that you have some time by yourself each day  How do I find support and more information? · [address] [place] [address], Public Information & Communication Branch  [address], [address], [address]  [place] [address]   Phone: [phone]  Phone: [phone]  Web Address: \[facility] [place] [facility]\"" tn  When should I contact my healthcare provider?    · You cannot make it to your next visit  · Your depression does not get better with treatment or it gets worse  · You have questions or concerns about your condition or care  When should I seek immediate care or call 911? · You think about hurting or killing yourself, your baby, or someone else  · You feel like other people want to hurt you  · You hear voices telling you to hurt yourself or your baby  CARE AGREEMENT:   You have the right to help plan your care  Learn about your health condition and how it may be treated  Discuss treatment options with your caregivers to decide what care you want to receive  You always have the right to refuse treatment  The above information is an  only  It is not intended as medical advice for individual conditions or treatments  Talk to your doctor, nurse or pharmacist before following any medical regimen to see if it is safe and effective for you  © 2017 [address] Information is for End User's use only and may not be sold, redistributed or otherwise used for commercial purposes  All illustrations and images included in CareNotes® are the copyrighted property of [employer] , [employer]  or [employer]  Breastfeeding and Breast Engorgement   WHAT YOU NEED TO KNOW:   Breast engorgement develops when too much milk builds up in your breast  It is normal for your breasts to feel swollen, heavy, and tender when your milk comes in  This is called breast fullness  When your breast starts to feel painful and hard, the fullness has developed into engorgement  Breast engorgement usually happens 3 to 5 days after you give birth  Engorgement can happen if you are not breastfeeding or expressing milk often, or produce a lot of milk  Your baby may have a hard time latching on (attaching) to your breast to feed  Without treatment, engorgement can lead to plugged milk ducts or a breast infection called mastitis     DISCHARGE INSTRUCTIONS:   Seek care immediately if:   · You have a fever with chills or body aches  · You have pain and swelling in one or both breasts that keeps you from breastfeeding  Contact your healthcare provider if:   · You have a tender breast lump that grows slowly and usually forms on one side of your breast     · You have a small, white bump on your nipple  · Your symptoms do not get better within 24 hours  · You have questions or concerns about your condition or care  Manage your symptoms:   · Breastfeed or pump every 2 or 3 hours  Frequent breastfeeding helps decrease engorgement discomfort  Express or pump milk from your breasts before you breastfeed  This will help soften your breast and your nipple, and allow your baby to latch on better  · Empty your breasts completely  Take your time when you breastfeed to allow your baby to empty your breast  Try not to switch breasts too early  Express or pump after you breastfeed if your baby is not emptying your breasts when he feeds  · Massage your breast   Breast massage helps empty your engorged breast and decrease pain  Gently massage your breast before and during breastfeeding to help increase your milk flow  Gently stroke your breast, starting from the outer areas and working your way toward the nipple  Breast massage may also help prevent breast engorgement if done in the first few days after you give birth  · Apply a cool compress in between feedings  The cold may help decrease swelling and pain in your engorged breast  Wet a washcloth in cold water, wring it out, and place it on your breast  Ask how long and how often to use a cool compress  · Wear a supportive bra  The bra should fit well but not be too tight  · Apply warmth to your breast before you breastfeed  Put a warm, wet cloth on your breast or take a warm shower  This can help increase your milk flow    Follow up with your healthcare provider as directed:  Write down your questions so you remember to ask them during your visits  For more information:   · [employer] [address] [facility] , [address] [facility] [address]  Phone: [phone]  Web Address: http://OhLife/  · [facility]  [address]   [facility] Vishal Bryan  Phone: [phone]  Phone: [phone]  Web Address: http://www [facility]/  org  © 2017 [address] [facility] Information is for End User's use only and may not be sold, redistributed or otherwise used for commercial purposes  All illustrations and images included in CareNotes® are the copyrighted property of A D A M , Inc  or [employer]  The above information is an  only  It is not intended as medical advice for individual conditions or treatments  Talk to your doctor, nurse or pharmacist before following any medical regimen to see if it is safe and effective for you

## 2018-07-22 NOTE — H&P
History & Physical - OB/GYN   Cyrus Guadarrama 41 y o  female MRN: [id number]  Unit/Bed#: LD Triage  Encounter: [id number]        She is a patient of Calvin Mejia (Dr Alice lerma)    Chief complaint:  "I have been oswaldo since 5:30pm"    HPI: Michel Tam is a 41 y o   at 40w6d by LMP complaining of contractions that started around 5:30 p m  initially contractions were every 30 minutes, and increased in frequency to every 5 minutes patient denies any loss of fluid, vaginal bleeding or decreased fetal movement  Pregnancy Complications:  Advanced maternal age  Obesity  Hx of Leep  Anxiety  Vitamin D Deficiency    PMH:  Past Medical History:   Diagnosis Date    Anxiety     last assessed - [id number]    Hyperlipidemia     last assessed - [id number]    IBS (irritable bowel syndrome)     Low grade mucinous neoplasm of appendix     last assessed - [id number]    Nausea     Normal delivery      daughter   Giselle Santamaria Palpitations     last assessed - [id number]    Right bundle branch block     last assessed - [id number]    Thyroid cyst     last assessed - [id number]    Vacuum extractor delivery, delivered     2012 daughter    Varicella     childhood    Vitamin D deficiency        PSH:  Past Surgical History:   Procedure Laterality Date    APPENDECTOMY      COLPOSCOPY W/ BIOPSY / CURETTAGE      Colposcopy Cervix with Biopsy(s)    LACRIMAL DUCT PROBING      surgery of the Lacrimal system    KS COLONOSCOPY FLX DX W/COLLJ SPEC WHEN PFRMD N/A 2/15/2016    Procedure: EGD AND COLONOSCOPY;  Surgeon: Juli Pelayo DO;  Location: [facility];   Service: General    SUBTOTAL COLECTOMY      Partial colectomy - with resection of appendix    TEAR DUCT SURGERY      TOOTH EXTRACTION      last assessed - [id number]    TUMOR REMOVAL  2015    removed from appendix       Social Hx:  Social History     Social History    Marital status: /Civil Union     Spouse name: N/A    Number of children: N/A    Years of education: N/A     Occupational History    Not on file  Social History Main Topics    Smoking status: Never Smoker    Smokeless tobacco: Never Used    Alcohol use Yes      Comment: rarely; drinks beer; social alcohol use    Drug use: No    Sexual activity: Yes     Birth control/ protection: Pill     Other Topics Concern    Not on file     Social History Narrative    Activities: Soccer    Always uses seat belt    Drinks coffee    Exercise: Running    Exercises moderately less than 3 times a week                 OB Hx:  Obstetric History       T2      L2     SAB0   TAB0   Ectopic0   Multiple0   Live Births2       # Outcome Date GA Lbr Diony/2nd Weight Sex Delivery Anes PTL Lv   3 Current            2 Term 14 39w6d  3402 g (7 lb 8 oz) F Vag-Spont Local  DIEGO   1 Term 12 40w0d  3090 g (6 lb 13 oz) F Vag-Vacuum None  DIEGO      Obstetric Comments   Advanced maternal age in multigravida - last assessed - [id number]   H/O delivery by vacuum extraction, currently pregnant - last assessed - [id number]   Antepartum hemorrhage, antepartum - Resolved - 2015   History of vacuum extraction assisted delivery - 2012 daughter       Meds:  No current facility-administered medications on file prior to encounter  Current Outpatient Prescriptions on File Prior to Encounter   Medication Sig Dispense Refill    Cholecalciferol (VITAMIN D) 2000 units CAPS Take 3,000 Units by mouth        Polyethylene Glycol 3350 (MIRALAX PO) Take 1 packet by mouth daily as needed   Prenatal Multivit-Min-Fe-FA (PRENATAL VITAMINS) 0 8 MG tablet Take 1 tablet by mouth daily         Allergies:   Allergies   Allergen Reactions    Seasonal Ic [Cholestatin] Allergic Rhinitis    Amoxicillin Hives    Augmentin [Amoxicillin-Pot Clavulanate] Hives    Fluoxetine Other (See Comments)     Other reaction(s): bloating    Penicillins Rash    Wound Dressings Rash       Labs:  Blood type: A+  Antibody: negative  Group B strep: negative  HIV: negative  Hepatitis B: negative  RPR: NR  Rubella: Immune  Varicella Immune  1 hour Glucose: 92    Physical Exam:  LMP 10/08/2017     Physical Exam   Constitutional: She is oriented to person, place, and time  She appears well-developed and well-nourished  No distress  HENT:   Head: Normocephalic and atraumatic  Cardiovascular: Normal rate and intact distal pulses  Pulmonary/Chest: Effort normal  No respiratory distress  She exhibits no tenderness  Abdominal: Soft  She exhibits no distension  There is no tenderness  There is no rebound and no guarding  Gravid   Musculoskeletal: Normal range of motion  Neurological: She is alert and oriented to person, place, and time  Skin: Skin is warm and dry  Psychiatric: She has a normal mood and affect  Her behavior is normal    Vitals reviewed  Estimated Fetal Weight: 7 lbs  Presentation: vertex    SVE: 5 / 80% / -2  FHT:  130 / Moderate 6 - 25 bpm / Cat I, Reactive  [place]: q2-5 min    Membranes: Intact    Assessment:   41 y o   at 40w6d admitted for labor  Plan:   1  Admit to L&D  2  CBC, RPR, type and screen  3  Expectant management  Epidural upon request    Discussed with Dr Porfirio Landa

## 2018-07-22 NOTE — DISCHARGE SUMMARY
Discharge Summary     Ulices Matthews 41 y o  female MRN: [id number]   Unit/Bed#: -01 Encounter: [id number]      Admission Date: 2018     Discharge Date: 2018    Attending: Donnell Flowers  Delivering Attending: Brenda Dominguez MD  Discharging Attending: REZA Rollins MD    Principal Diagnosis: Pregnancy at 41w0d    Secondary Diagnosis:   AMA  Obesity  Anxiety    Procedures: spontaneous vaginal delivery    Anesthesia: local    Complications: none apparent    Hospital Course:   Trinity Weldon is a 29-year-old now [phone] who was initially admitted for spontaneous labor  She received 1 dose of Stadol for analgesia and labored without further intervention or complication  She spontaneously ruptured for clear fluid and was found to be complete at 0029  After pushing for 15 minutes she delivered a viable male  on 18 at [phone]  Weight 7lbs 15oz via spontaneous vaginal delivery  Apgars were 9 (1 min) and 9 (5 min)   was transferred to  nursery  Patient tolerated the procedure well and was transferred to recovery in stable condition  Her post-partum course was uncomplicated  Her postoperative pain was well controlled with oral analgesics  On day of discharge, she was ambulating and able to reasonably perform all ADLs  She was voiding and had appropriate bowel function  Pain was well controlled  She was discharged home on post-partum day #2 without complications  Patient was instructed to follow up with her OB as an outpatient and was given appropriate warnings to call provider if she develops signs of infection or uncontrolled pain  Condition at discharge: good      Discharge instructions/Information to patient and family:   - Do not place anything in your vagina for 6 weeks  -You may walk for exercise for the first 6 weeks then gradually return to your usual activities    -Please do not drive if you require narcotic pain medication    -You may take baths or shower per your preference  -Please look at your bust (breasts) in the mirror daily and call for redness or tenderness or increased warmth    -Please call us for temperature > 100 4*F or 38* C, worsening pain or a foul discharge       Discharge Medications:   Prenatal vitamin daily for 6 months or the duration of nursing whichever is longer  Motrin 600 mg orally every 6 hours as needed for pain  Tylenol (over the counter) per bottle directions as needed for pain: do NOT use with percocet  Hydrocortisone cream 1% (over the counter) applied 1-2x daily to hemorrhoids as needed    Provisions for Follow-Up Care:  See after visit summary for information related to follow-up care and any pertinent home health orders  Disposition: See After Visit Summary for discharge disposition information  Planned Readmission: No    Discharge Medications: For a complete list of the patient's medications, please refer to her med rec

## 2018-07-23 ENCOUNTER — TRANSITIONAL CARE MANAGEMENT (OUTPATIENT)
Dept: INTERNAL MEDICINE CLINIC | Facility: CLINIC | Age: 38
End: 2018-07-23

## 2018-07-23 VITALS
RESPIRATION RATE: 20 BRPM | WEIGHT: 223.81 LBS | SYSTOLIC BLOOD PRESSURE: 106 MMHG | DIASTOLIC BLOOD PRESSURE: 67 MMHG | OXYGEN SATURATION: 98 % | HEIGHT: 68 IN | TEMPERATURE: 98.7 F | HEART RATE: 80 BPM | BODY MASS INDEX: 33.92 KG/M2

## 2018-07-23 LAB — RPR SER QL: NORMAL

## 2018-07-23 PROCEDURE — TCMXX

## 2018-07-23 RX ORDER — DOCUSATE SODIUM 100 MG/1
100 CAPSULE, LIQUID FILLED ORAL 2 TIMES DAILY
Qty: 10 CAPSULE | Refills: 0
Start: 2018-07-23 | End: 2018-08-14 | Stop reason: ALTCHOICE

## 2018-07-23 RX ORDER — ACETAMINOPHEN 325 MG/1
650 TABLET ORAL EVERY 6 HOURS PRN
Qty: 30 TABLET | Refills: 0
Start: 2018-07-23 | End: 2018-08-14 | Stop reason: ALTCHOICE

## 2018-07-23 RX ORDER — DIAPER,BRIEF,INFANT-TODD,DISP
1 EACH MISCELLANEOUS AS NEEDED
Qty: 30 G | Refills: 0
Start: 2018-07-23 | End: 2018-08-14 | Stop reason: ALTCHOICE

## 2018-07-23 RX ORDER — IBUPROFEN 600 MG/1
600 TABLET ORAL EVERY 6 HOURS PRN
Qty: 30 TABLET | Refills: 0
Start: 2018-07-23 | End: 2018-08-14 | Stop reason: ALTCHOICE

## 2018-07-23 RX ADMIN — IBUPROFEN 600 MG: 600 TABLET ORAL at 13:50

## 2018-07-23 RX ADMIN — IBUPROFEN 600 MG: 600 TABLET ORAL at 00:41

## 2018-07-23 RX ADMIN — DOCUSATE SODIUM 100 MG: 100 CAPSULE, LIQUID FILLED ORAL at 09:53

## 2018-07-23 NOTE — LACTATION NOTE
This note was copied from a baby's chart  Spoke with Daina Enriquez who says she is well experienced with "normal nipple pain"  Met with mother to go over feeding log since birth for the first week  Emphasized 8 or more (12) feedings in a 24 hour period, what to expect for the number of diapers per day of life and the progression of properties of the  stooling pattern  Discussed s/s that breastfeeding is going well after day 4 and when to get help from a pediatrician or lactation support person after day 4  Booklet included Breast Pumping Instructions, When You Go Back to Work or School, and Breastfeeding Resources for after discharge including access to the number for the [employer]  Powerpoint given on breastfeeding class at patient request     Discussed s/s engorgement and how to manage with medications and cool compresses as well as s/s mastitis and when to contact physician  Encouraged MOB to call for assistance, questions, and concerns about breastfeeding  Extension provided

## 2018-07-23 NOTE — PROGRESS NOTES
Progress Note - OB/GYN   Charley Turpin 41 y o  female MRN: [id number]  Unit/Bed#: -01 Encounter: [id number]    Assessment:  Post partum Day #1 s/p , stable, baby in room with mom    Plan:  1  Post partum   - Continue routine post partum care  - Encourage ambulation  - Encourage breastfeeding    2  Hx of Anxiety  - Not on any medications at this time  3  IBS  - Will continue to monitor for symptoms   - No concerns at this time    4  Discharge planning  - Anticipate discharge tomorrow (2018)        Subjective/Objective   Chief Complaint:     Post delivery  Patient is doing well  Lochia WNL  Pain well controlled  Subjective:     Pain: yes, cramping, improved with meds  Tolerating PO: yes  Voiding: yes  Flatus: yes  BM: no  Ambulating: yes  Breastfeeding:  yes  Chest pain: no  Shortness of breath: no  Leg pain: no  Lochia: minimal    Objective:     Vitals: /79 (BP Location: Left arm)   Pulse 68   Temp 98 1 °F (36 7 °C) (Oral)   Resp 18   Ht 5' 8" (1 727 m)   Wt 102 kg (223 lb 13 oz)   LMP 10/08/2017   SpO2 98%   Breastfeeding? Yes   BMI 34 03 kg/m²     No intake or output data in the 24 hours ending 18 0926    Lab Results   Component Value Date    WBC 10 03 2018    HGB 10 9 (L) 2018    HCT 33 8 (L) 2018    MCV 92 2018     2018       Physical Exam:     General: Awake, Alert & Oriented x3  Head: Normocephalic  Atraumatic  CV: Regular rhythm and rate with normal S1/S2  No murmur, rub, or gallop  Respiratory: Equal breath sounds with symmetric chest rise  Clear to auscultation bilaterally  No wheezes, rhonchi, rales, or crackles  Abdomen: Soft, non-distended  No tenderness to palpation  No rigidity  MSK: No deformity  No LE edema or tenderness  : Uterine fundus firm and non-tender, at the level of the umbilicus   Lochia minimal       Carolyn Erwin MD  2018  6:33 AM

## 2018-07-23 NOTE — SOCIAL WORK
Breast pump consult  Spoke with pt who states she already has breast pump at home and denies any CM needs at this time

## 2018-07-24 ENCOUNTER — TELEPHONE (OUTPATIENT)
Dept: OBGYN CLINIC | Facility: CLINIC | Age: 38
End: 2018-07-24

## 2018-07-31 LAB — PLACENTA IN STORAGE: NORMAL

## 2018-08-02 ENCOUNTER — TELEPHONE (OUTPATIENT)
Dept: OBGYN CLINIC | Facility: CLINIC | Age: 38
End: 2018-08-02

## 2018-08-02 NOTE — TELEPHONE ENCOUNTER
Pt  Had baby 11 days ago, says she hasn't bleed a lot but she went to bathroom and it was a gosh and heavy, and not sure if she tore a stitch or if she should let it ago

## 2018-08-02 NOTE — TELEPHONE ENCOUNTER
Patient is aware that this is normal and will observe  Advised her that if she has really heavy bleeding and it won't stop to call us back or go to ER  She understands

## 2018-08-02 NOTE — TELEPHONE ENCOUNTER
Spoke with pt - she delivered 11 days ago by Dr Bhavik Medina  Since delivery has had only light bleeding to a brown discharge  Yesterday she went to the bathroom and had a heavier bleeding - felt like a gush and was bright red  No clots, nothing since  No pain and doesn't think she tore a stitch  Wants to know if this is normal  Patient was worried  Please advise  Thanks!

## 2018-08-14 ENCOUNTER — POSTPARTUM VISIT (OUTPATIENT)
Dept: OBGYN CLINIC | Facility: CLINIC | Age: 38
End: 2018-08-14

## 2018-08-14 VITALS — BODY MASS INDEX: 31.11 KG/M2 | SYSTOLIC BLOOD PRESSURE: 118 MMHG | WEIGHT: 204.6 LBS | DIASTOLIC BLOOD PRESSURE: 72 MMHG

## 2018-08-14 PROBLEM — O09.523 ELDERLY MULTIGRAVIDA IN THIRD TRIMESTER: Status: RESOLVED | Noted: 2018-02-15 | Resolved: 2018-08-14

## 2018-08-14 PROBLEM — Z34.83 PRENATAL CARE, SUBSEQUENT PREGNANCY, THIRD TRIMESTER: Status: RESOLVED | Noted: 2018-07-12 | Resolved: 2018-08-14

## 2018-08-14 PROBLEM — Z3A.40 40 WEEKS GESTATION OF PREGNANCY: Status: RESOLVED | Noted: 2018-07-16 | Resolved: 2018-08-14

## 2018-08-14 PROCEDURE — 99024 POSTOP FOLLOW-UP VISIT: CPT | Performed by: PHYSICIAN ASSISTANT

## 2018-08-14 NOTE — PROGRESS NOTES
Juanita Kayser  1980    S:  41 y o  female here for postpartum visit  She is s/p Uncomplicated vaginal delivery on 7/22/18  Gender: male   Apgars: 9,9  Weight: 7 15  Her lochia has resolved  She is Breastfeeding without problems  She denies postpartum blues/depression  Last Pap: 6/22/16 neg/neg    We discussed contraceptive options and her  is planning a vasectomy  She has some bladder pressure that is a little better over the past 2 days  She denies frequency, urgency or dysuria  She will call if this persists in a few weeks or sooner if it worsens       Past Medical History:   Diagnosis Date    Anxiety     last assessed - [id number]    Hyperlipidemia     last assessed - [id number]    IBS (irritable bowel syndrome)     Low grade mucinous neoplasm of appendix     last assessed - [id number]    Nausea     Normal delivery     2014 daughter   Arely Gallo Palpitations     last assessed - [id number]    Right bundle branch block     last assessed - [id number]    Thyroid cyst     last assessed - [id number]    Vacuum extractor delivery, delivered     2012 daughter    Varicella     childhood    Vitamin D deficiency      Family History   Problem Relation Age of Onset    Heart murmur Mother         type unspecified     Cancer Mother         thyroid    Hyperlipidemia Father     Hypertension Father     Arthritis Maternal Grandmother     Coronary artery disease Maternal Grandmother     Transient ischemic attack Maternal Grandmother     Cancer Maternal Grandmother         thyroid    Hypertension Maternal Grandmother     Stroke Maternal Grandmother     Depression Paternal Grandmother     Cancer Maternal Aunt         lung    Lake syndrome Paternal Aunt     Cancer Paternal Aunt         lymphoma    Depression Sister     Cancer Paternal Grandfather         bowel    Heart disease Paternal Grandfather     Hyperlipidemia Paternal Grandfather     Hypertension Paternal Grandfather     Heart defect Maternal Aunt      Social History     Social History    Marital status: /Civil Union     Spouse name: N/A    Number of children: N/A    Years of education: N/A     Social History Main Topics    Smoking status: Never Smoker    Smokeless tobacco: Never Used    Alcohol use Yes      Comment: rarely; drinks beer; social alcohol use    Drug use: No    Sexual activity: Yes     Birth control/ protection: Pill     Other Topics Concern    None     Social History Narrative    Activities: Soccer    Always uses seat belt    Drinks coffee    Exercise: Running    Exercises moderately less than 3 times a week               O:   /72   Wt 92 8 kg (204 lb 9 6 oz)   LMP 10/08/2017   Breastfeeding? Yes   BMI 31 11 kg/m²     She appears well and in no distress  Abdomen is soft and nontender    A/P:  Postpartum visit  She will return in 6 months for her yearly exam, sooner PRN

## 2018-09-01 DIAGNOSIS — D37.3 NEOPLASM OF UNCERTAIN BEHAVIOR OF APPENDIX: ICD-10-CM

## 2018-09-01 DIAGNOSIS — E78.5 HYPERLIPIDEMIA: ICD-10-CM

## 2018-09-12 ENCOUNTER — TRANSCRIBE ORDERS (OUTPATIENT)
Dept: ADMINISTRATIVE | Age: 38
End: 2018-09-12

## 2018-09-12 ENCOUNTER — APPOINTMENT (OUTPATIENT)
Dept: LAB | Age: 38
End: 2018-09-12
Payer: COMMERCIAL

## 2018-09-12 DIAGNOSIS — D37.3 NEOPLASM OF UNCERTAIN BEHAVIOR OF APPENDIX: ICD-10-CM

## 2018-09-12 DIAGNOSIS — E78.5 HYPERLIPIDEMIA: ICD-10-CM

## 2018-09-12 DIAGNOSIS — Z00.00 ENCOUNTER FOR GENERAL ADULT MEDICAL EXAMINATION WITHOUT ABNORMAL FINDINGS: ICD-10-CM

## 2018-09-12 LAB
BUN SERPL-MCNC: 14 MG/DL (ref 5–25)
CEA SERPL-MCNC: <0.5 NG/ML (ref 0–3)
CHOLEST SERPL-MCNC: 242 MG/DL (ref 50–200)
CREAT SERPL-MCNC: 0.69 MG/DL (ref 0.6–1.3)
GFR SERPL CREATININE-BSD FRML MDRD: 112 ML/MIN/1.73SQ M
HDLC SERPL-MCNC: 53 MG/DL (ref 40–60)
LDLC SERPL CALC-MCNC: 176 MG/DL (ref 0–100)
NONHDLC SERPL-MCNC: 189 MG/DL
TRIGL SERPL-MCNC: 65 MG/DL

## 2018-09-12 PROCEDURE — 80061 LIPID PANEL: CPT

## 2018-09-12 PROCEDURE — 36415 COLL VENOUS BLD VENIPUNCTURE: CPT

## 2018-09-12 PROCEDURE — 82565 ASSAY OF CREATININE: CPT

## 2018-09-12 PROCEDURE — 82378 CARCINOEMBRYONIC ANTIGEN: CPT

## 2018-09-12 PROCEDURE — 84520 ASSAY OF UREA NITROGEN: CPT

## 2018-09-27 ENCOUNTER — HOSPITAL ENCOUNTER (OUTPATIENT)
Dept: CT IMAGING | Facility: HOSPITAL | Age: 38
Discharge: HOME | End: 2018-09-27
Attending: SURGERY
Payer: COMMERCIAL

## 2018-09-27 DIAGNOSIS — D37.3 NEOPLASM OF UNCERTAIN OR UNKNOWN BEHAVIOR OF APPENDIX: ICD-10-CM

## 2018-09-27 PROCEDURE — 74177 CT ABD & PELVIS W/CONTRAST: CPT

## 2018-09-27 PROCEDURE — 71260 CT THORAX DX C+: CPT

## 2018-09-27 RX ADMIN — IOHEXOL 100 ML: 350 INJECTION, SOLUTION INTRAVENOUS at 10:19

## 2018-10-09 ENCOUNTER — OFFICE VISIT (OUTPATIENT)
Dept: SURGICAL ONCOLOGY | Facility: CLINIC | Age: 38
End: 2018-10-09
Payer: COMMERCIAL

## 2018-10-09 VITALS
RESPIRATION RATE: 16 BRPM | DIASTOLIC BLOOD PRESSURE: 78 MMHG | SYSTOLIC BLOOD PRESSURE: 126 MMHG | TEMPERATURE: 97.8 F | BODY MASS INDEX: 30.92 KG/M2 | HEART RATE: 74 BPM | HEIGHT: 68 IN | WEIGHT: 204 LBS

## 2018-10-09 DIAGNOSIS — C18.1 CANCER OF APPENDIX (HCC): Primary | ICD-10-CM

## 2018-10-09 PROCEDURE — 99215 OFFICE O/P EST HI 40 MIN: CPT | Performed by: SURGERY

## 2018-10-09 NOTE — LETTER
October 9, 2018     [address]ever[address] Pock, [address] [facility] [address] [place] [address]    Patient: May Hayden   YOB: 1980   Date of Visit: 10/9/2018       Dear Dr Yossi Palam: Thank you for referring Lexi Cueto to me for evaluation  Below are my notes for this consultation  If you have questions, please do not hesitate to call me  I look forward to following your patient along with you  Sincerely,        Sierra Quinn MD        CC: No Recipients  Sierra Quinn MD  10/9/2018 11:32 AM  Sign at close encounter               Surgical Oncology Follow Up       [address] [place] [address] [address]  1980  [id number]  [address] [facility],[address]  [facility] [address] [address]    Diagnoses and all orders for this visit:    Cancer of appendix ([address] )  -     CT chest abdomen pelvis w contrast; Future  -     BUN; Future  -     CEA; Future  -     Creatinine, serum; Future        Chief Complaint   Patient presents with    Follow-up       Return in about 1 year (around 10/9/2019) for Office Visit, Imaging - See orders, Labs - See Treatment Plan  No history exists  Diagnosis and Staging: Low-grade mucinous appendiceal neoplasm  No high-grade dysplasia  Margins negative   Treatment History: Laparoscopic appendectomy December 2015   Current Therapy: Observation   Disease Status: MEGA   History of Present Illness:   Patient returns in follow-up of her low-grade mucinous appendiceal neoplasm  She is doing well at this time with no complaints  She recently delivered her 3rd child  She also did have a cold at the time of her CT  Her CEA is normal   CT from September 27, 2018 reveals no evidence of recurrence  There is a new subpleural left lower lobe pulmonary density that measures 4 x 6 mm  This was felt to be benign    Follow-up CT was recommended in 3-6 months  There was mild axillary adenopathy  I personally reviewed the films  She denies any new abdominal pain, nausea or vomiting  Her appetite is good  Review of Systems  Complete ROS Surg Onc:   Complete ROS Surg Onc:   Constitutional: The patient denies new or recent history of general fatigue, no recent weight loss, no change in appetite  Eyes: No complaints of visual problems, no scleral icterus  ENT: no complaints of ear pain, no hoarseness, no difficulty swallowing,  no tinnitus and no new masses in head, oral cavity, or neck  Cardiovascular: No complaints of chest pain, no palpitations, no ankle edema  Respiratory: No complaints of shortness of breath, no cough  Gastrointestinal: No complaints of jaundice, no bloody stools, no pale stools  Genitourinary: No complaints of dysuria, no hematuria, no nocturia, no frequent urination, no urethral discharge  Musculoskeletal: No complaints of weakness, paralysis, joint stiffness or arthralgias  Integumentary: No complaints of rash, no new lesions  Neurological: No complaints of convulsions, no seizures, no dizziness  Hematologic/Lymphatic: No complaints of easy bruising  Endocrine:  No hot or cold intolerance  No polydipsia, polyphagia, or polyuria  Allergy/immunology:  No environmental allergies  No food allergies  Not immunocompromised  Skin:  No pallor or rash  No wound          Patient Active Problem List   Diagnosis    Anxiety    Benign colon polyp    Chronic constipation    GERD without esophagitis    Hyperlipidemia    IBS (irritable bowel syndrome)    Vitamin D deficiency    History of loop electrical excision procedure (LEEP)    H/O right bundle branch block    Pain of both hip joints     Past Medical History:   Diagnosis Date    Anxiety     last assessed - [id number]    Hyperlipidemia     last assessed - 37Ixv9349    IBS (irritable bowel syndrome)     Low grade mucinous neoplasm of appendix     last assessed - 10MAO9632    Nausea     Normal delivery     2014 daughter   Zoey Maryessi Palpitations     last assessed - 10VHA9650    Right bundle branch block     last assessed - [id number]    Thyroid cyst     last assessed - 15Oct2012    Vacuum extractor delivery, delivered     2012 daughter    Varicella     childhood    Vitamin D deficiency      Past Surgical History:   Procedure Laterality Date    APPENDECTOMY      COLPOSCOPY W/ BIOPSY / CURETTAGE      Colposcopy Cervix with Biopsy(s)    LACRIMAL DUCT PROBING      surgery of the Lacrimal system    MS COLONOSCOPY FLX DX W/COLLJ SPEC WHEN PFRMD N/A 2/15/2016    Procedure: EGD AND COLONOSCOPY;  Surgeon: Ingrid Clark DO;  Location:  GI LAB; Service: General    SUBTOTAL COLECTOMY      Partial colectomy - with resection of appendix    TEAR DUCT SURGERY      TOOTH EXTRACTION      last assessed - 18Oct2015    TUMOR REMOVAL  12/2015    removed from appendix     Family History   Problem Relation Age of Onset    Heart murmur Mother         type unspecified     Cancer Mother         thyroid    Hyperlipidemia Father     Hypertension Father     Arthritis Maternal Grandmother     Coronary artery disease Maternal Grandmother     Transient ischemic attack Maternal Grandmother     Cancer Maternal Grandmother         thyroid    Hypertension Maternal Grandmother     Stroke Maternal Grandmother     Depression Paternal Grandmother     Cancer Maternal Aunt         lung    Lake syndrome Paternal Aunt     Cancer Paternal Aunt         lymphoma    Depression Sister     Cancer Paternal Grandfather         bowel    Heart disease Paternal Grandfather     Hyperlipidemia Paternal Grandfather     Hypertension Paternal Grandfather     Heart defect Maternal Aunt      Social History     Social History    Marital status: /Civil Union     Spouse name: N/A    Number of children: N/A    Years of education: N/A     Occupational History    Not on file       Social History Main Topics    Smoking status: Never Smoker    Smokeless tobacco: Never Used    Alcohol use Yes      Comment: rarely; drinks beer; social alcohol use    Drug use: No    Sexual activity: Yes     Birth control/ protection: Pill     Other Topics Concern    Not on file     Social History Narrative    Activities: Soccer    Always uses seat belt    Drinks coffee    Exercise: Running    Exercises moderately less than 3 times a week               Current Outpatient Prescriptions:     Cholecalciferol (VITAMIN D) 2000 units CAPS, Take 3,000 Units by mouth  , Disp: , Rfl:     Polyethylene Glycol 3350 (MIRALAX PO), Take 1 packet by mouth daily as needed  , Disp: , Rfl:     Prenatal Multivit-Min-Fe-FA (PRENATAL VITAMINS) 0 8 MG tablet, Take 1 tablet by mouth daily, Disp: , Rfl:     witch hazel-glycerin (TUCKS) topical pad, Apply 1 pad topically as needed for irritation, Disp: 40 each, Rfl: 0  Allergies   Allergen Reactions    Seasonal Ic [Cholestatin] Allergic Rhinitis    Amoxicillin Hives    Augmentin [Amoxicillin-Pot Clavulanate] Hives    Fluoxetine Other (See Comments)     Other reaction(s): bloating    Penicillins Rash    Wound Dressings Rash     Vitals:    10/09/18 1111   BP: 126/78   Pulse: 74   Resp: 16   Temp: 97 8 °F (36 6 °C)       Physical Exam  Constitutional: General appearance: The Patient is well-developed and well-nourished who appears the stated age in no acute distress  Patient is pleasant and talkative  HEENT:  Normocephalic  Sclerae are anicteric  Mucous membranes are moist  Neck is supple without adenopathy  No JVD  Chest: The lungs are clear to auscultation  Cardiac: Heart is regular rate  Abdomen: Abdomen is soft, non-tender, non-distended and without masses  Extremities: There is no clubbing or cyanosis  There is no edema  Symmetric  Neuro: Grossly nonfocal  Gait is normal      Lymphatic: No evidence of cervical adenopathy bilaterally     No evidence of axillary adenopathy bilaterally  No evidence of inguinal adenopathy bilaterally  Skin: Warm, anicteric  Psych:  Patient is pleasant and talkative  Breasts:        Pathology:      Labs:      Imaging  Ct Chest Abdomen Pelvis W Contrast    Result Date: 9/28/2018  Narrative: CT CHEST, ABDOMEN AND PELVIS WITH IV CONTRAST INDICATION:   D37 3: Neoplasm of uncertain behavior of appendix  COMPARISON:  12/19/2016 TECHNIQUE: CT examination of the chest, abdomen and pelvis was performed  Axial, sagittal, and coronal 2D reformatted images were created from the source data and submitted for interpretation  Radiation dose length product (DLP) for this visit:  664 mGy-cm   This examination, like all CT scans performed in the [facility], was performed utilizing techniques to minimize radiation dose exposure, including the use of iterative reconstruction and automated exposure control  IV Contrast:  100 mL of iohexol (OMNIPAQUE) Enteric Contrast: Enteric contrast was administered  FINDINGS: CHEST LUNGS:  There is a stable 2 mm noncalcified right lower lobe pulmonary nodule on image 48 series 2  There is a stable 1 mm linear type nodular density on image 45 at the right base  In the subpleural location of the left lower lobe on image 26 series 2 there is hemispheric shaped nodular density which is 4 x 6 mm  This is new since the previous exam   There is a stable tiny subpleural nodule laterally in the left lower lobe on image 44 measuring only a couple millimeters  No suspicious infiltrates or dominant masses or airway obstructions are seen  PLEURA:  No effusion or pneumothorax HEART/GREAT VESSELS:  Unremarkable for patient's age  MEDIASTINUM AND ROMEO:  Unremarkable  CHEST WALL AND LOWER NECK:   The patient's breasts are noticeably larger and denser than on the previous exam with significantly more glandular appearing tissue bilaterally  Correlate with hormonal status    Within both axillary regions, there are subcutaneous nodular opacities which are new  These are indeterminate in etiology but seem to be associated potentially with some mild skin thickening and might be an inflammatory process or sebaceous cysts  Additionally, there is a slightly enlarged right-sided subpectoral lymph node which is new from previous exam measuring 10 x 17 mm  Other adjacent small lymph nodes are also noted  There are no obvious breast masses appreciated  ABDOMEN LIVER/BILIARY TREE:  Unremarkable  GALLBLADDER:  No calcified gallstones  No pericholecystic inflammatory change  SPLEEN:  Unremarkable  PANCREAS:  Unremarkable  ADRENAL GLANDS:  Unremarkable  KIDNEYS/URETERS:  Unremarkable  No hydronephrosis  STOMACH AND BOWEL:  Unremarkable  APPENDIX:  Absent  No evidence of mass in the right lower quadrant  ABDOMINOPELVIC CAVITY:  There is no ascites or peritoneal carcinomatosis visible  There is no free air  There is no suspicious lymphadenopathy  VESSELS:  Circumaortic left renal vein is noted  The aorta is normal caliber  No acute vascular pathology seen  PELVIS REPRODUCTIVE ORGANS:  Unremarkable for patient's age  URINARY BLADDER:  Unremarkable  ABDOMINAL WALL/INGUINAL REGIONS:  Unremarkable  OSSEOUS STRUCTURES:  Stable compared with prior  Impression: There is a new subpleural left lower lobe nodular density measuring 4 x 6 mm on image 26 series 2  This is most likely a benign finding but probably warrants reevaluation in 3-6 months with repeat chest CT  Other pre-existing tiny scattered lung nodules are stable  There are subcutaneous nodular densities in the axillary regions and there is mild right axillary lymphadenopathy  Findings may represent sebaceous cysts or other small areas of infectious or inflammatory change which could be correlated with physical exam and history  Both breasts appear larger and much denser than on the prior CT but no intrinsic breast mass is visible    This might represent change in hormonal status  The abdomen and pelvis are stable and unremarkable  Workstation performed: [id number]     I reviewed the above laboratory and imaging data  Discussion/Summary:   56-year-old female status post laparoscopic appendectomy for low-grade mucinous appendiceal neoplasm  There is no evidence of recurrence by imaging, symptomatology, or physical exam  Her CEA level is normal  I did discuss that the lung nodules I suspect are completely benign  The patient is not a smoker  I discussed repeating the CT in 6 months verses 1 year  I think repeating this in 1 year is reasonable  We will schedule her repeat CT and CEA level for 1 year  I will see her again in one year with repeat imaging and a CEA level  If she develops any chest symptoms in the interim we will obtain the CT sooner  Assuming her neck CT and CEA are normal, we will space out her imaging to every 2 years  She is agreeable to this  All her questions were answered

## 2018-10-09 NOTE — PROGRESS NOTES
Surgical Oncology Follow Up       [address] [place] [address],[address] [place] [address]  [facility] [address] [address]    Palmer Rubalcava  1980  [id number]  [address] [place] [address]  [facility] [address] [address]    Diagnoses and all orders for this visit:    Cancer of appendix [facility])  -     CT chest abdomen pelvis w contrast; Future  -     BUN; Future  -     CEA; Future  -     Creatinine, serum; Future        Chief Complaint   Patient presents with    Follow-up       Return in about 1 year (around 10/9/2019) for Office Visit, Imaging - See orders, Labs - See Treatment Plan  No history exists  Diagnosis and Staging: Low-grade mucinous appendiceal neoplasm  No high-grade dysplasia  Margins negative   Treatment History: Laparoscopic appendectomy December 2015   Current Therapy: Observation   Disease Status: MEGA   History of Present Illness:   Patient returns in follow-up of her low-grade mucinous appendiceal neoplasm  She is doing well at this time with no complaints  She recently delivered her 3rd child  She also did have a cold at the time of her CT  Her CEA is normal   CT from September 27, 2018 reveals no evidence of recurrence  There is a new subpleural left lower lobe pulmonary density that measures 4 x 6 mm  This was felt to be benign  Follow-up CT was recommended in 3-6 months  There was mild axillary adenopathy  I personally reviewed the films  She denies any new abdominal pain, nausea or vomiting  Her appetite is good  Review of Systems  Complete ROS Surg Onc:   Complete ROS Surg Onc:   Constitutional: The patient denies new or recent history of general fatigue, no recent weight loss, no change in appetite  Eyes: No complaints of visual problems, no scleral icterus     ENT: no complaints of ear pain, no hoarseness, no difficulty swallowing,  no tinnitus and no new masses in head, oral cavity, or neck  Cardiovascular: No complaints of chest pain, no palpitations, no ankle edema  Respiratory: No complaints of shortness of breath, no cough  Gastrointestinal: No complaints of jaundice, no bloody stools, no pale stools  Genitourinary: No complaints of dysuria, no hematuria, no nocturia, no frequent urination, no urethral discharge  Musculoskeletal: No complaints of weakness, paralysis, joint stiffness or arthralgias  Integumentary: No complaints of rash, no new lesions  Neurological: No complaints of convulsions, no seizures, no dizziness  Hematologic/Lymphatic: No complaints of easy bruising  Endocrine:  No hot or cold intolerance  No polydipsia, polyphagia, or polyuria  Allergy/immunology:  No environmental allergies  No food allergies  Not immunocompromised  Skin:  No pallor or rash  No wound          Patient Active Problem List   Diagnosis    Anxiety    Benign colon polyp    Chronic constipation    GERD without esophagitis    Hyperlipidemia    IBS (irritable bowel syndrome)    Vitamin D deficiency    History of loop electrical excision procedure (LEEP)    H/O right bundle branch block    Pain of both hip joints     Past Medical History:   Diagnosis Date    Anxiety     last assessed - [id number]    Hyperlipidemia     last assessed - [id number]    IBS (irritable bowel syndrome)     Low grade mucinous neoplasm of appendix     last assessed - [id number]    Nausea     Normal delivery     2014 daughter   [employer] Palpitations     last assessed - [id number]    Right bundle branch block     last assessed - [id number]    Thyroid cyst     last assessed - [id number]    Vacuum extractor delivery, delivered     2012 daughter    Varicella     childhood    Vitamin D deficiency      Past Surgical History:   Procedure Laterality Date    APPENDECTOMY      COLPOSCOPY W/ BIOPSY / CURETTAGE      Colposcopy Cervix with Biopsy(s)    LACRIMAL DUCT PROBING      surgery of the Lacrimal system    TN COLONOSCOPY FLX DX W/COLLJ SPEC WHEN PFRMD N/A 2/15/2016    Procedure: EGD AND COLONOSCOPY;  Surgeon: Dayna Tamayo DO;  Location: [facility]; Service: General    SUBTOTAL COLECTOMY      Partial colectomy - with resection of appendix    TEAR DUCT SURGERY      TOOTH EXTRACTION      last assessed - 18Oct2015    TUMOR REMOVAL  12/2015    removed from appendix     Family History   Problem Relation Age of Onset    Heart murmur Mother         type unspecified     Cancer Mother         thyroid    Hyperlipidemia Father     Hypertension Father     Arthritis Maternal Grandmother     Coronary artery disease Maternal Grandmother     Transient ischemic attack Maternal Grandmother     Cancer Maternal Grandmother         thyroid    Hypertension Maternal Grandmother     Stroke Maternal Grandmother     Depression Paternal Grandmother     Cancer Maternal Aunt         lung    Lake syndrome Paternal Aunt     Cancer Paternal Aunt         lymphoma    Depression Sister     Cancer Paternal Grandfather         bowel    Heart disease Paternal Grandfather     Hyperlipidemia Paternal Grandfather     Hypertension Paternal Grandfather     Heart defect Maternal Aunt      Social History     Social History    Marital status: /Civil Union     Spouse name: N/A    Number of children: N/A    Years of education: N/A     Occupational History    Not on file       Social History Main Topics    Smoking status: Never Smoker    Smokeless tobacco: Never Used    Alcohol use Yes      Comment: rarely; drinks beer; social alcohol use    Drug use: No    Sexual activity: Yes     Birth control/ protection: Pill     Other Topics Concern    Not on file     Social History Narrative    Activities: Soccer    Always uses seat belt    Drinks coffee    Exercise: Running    Exercises moderately less than 3 times a week               Current Outpatient Prescriptions:     Cholecalciferol (VITAMIN D) 2000 units CAPS, Take 3,000 Units by mouth  , Disp: , Rfl:     Polyethylene Glycol 3350 (MIRALAX PO), Take 1 packet by mouth daily as needed  , Disp: , Rfl:     Prenatal Multivit-Min-Fe-FA (PRENATAL VITAMINS) 0 8 MG tablet, Take 1 tablet by mouth daily, Disp: , Rfl:     witch hazel-glycerin (TUCKS) topical pad, Apply 1 pad topically as needed for irritation, Disp: 40 each, Rfl: 0  Allergies   Allergen Reactions    Seasonal Ic [Cholestatin] Allergic Rhinitis    Amoxicillin Hives    Augmentin [Amoxicillin-Pot Clavulanate] Hives    Fluoxetine Other (See Comments)     Other reaction(s): bloating    Penicillins Rash    Wound Dressings Rash     Vitals:    10/09/18 1111   BP: 126/78   Pulse: 74   Resp: 16   Temp: 97 8 °F (36 6 °C)       Physical Exam  Constitutional: General appearance: The Patient is well-developed and well-nourished who appears the stated age in no acute distress  Patient is pleasant and talkative  HEENT:  Normocephalic  Sclerae are anicteric  Mucous membranes are moist  Neck is supple without adenopathy  No JVD  Chest: The lungs are clear to auscultation  Cardiac: Heart is regular rate  Abdomen: Abdomen is soft, non-tender, non-distended and without masses  Extremities: There is no clubbing or cyanosis  There is no edema  Symmetric  Neuro: Grossly nonfocal  Gait is normal      Lymphatic: No evidence of cervical adenopathy bilaterally  No evidence of axillary adenopathy bilaterally  No evidence of inguinal adenopathy bilaterally  Skin: Warm, anicteric  Psych:  Patient is pleasant and talkative  Breasts:        Pathology:      Labs:      Imaging  Ct Chest Abdomen Pelvis W Contrast    Result Date: 9/28/2018  Narrative: CT CHEST, ABDOMEN AND PELVIS WITH IV CONTRAST INDICATION:   D37 3: Neoplasm of uncertain behavior of appendix  COMPARISON:  12/19/2016 TECHNIQUE: CT examination of the chest, abdomen and pelvis was performed   Axial, sagittal, and coronal 2D reformatted images were created from the source data and submitted for interpretation  Radiation dose length product (DLP) for this visit:  664 mGy-cm   This examination, like all CT scans performed in the [facility], was performed utilizing techniques to minimize radiation dose exposure, including the use of iterative reconstruction and automated exposure control  IV Contrast:  100 mL of iohexol (OMNIPAQUE) Enteric Contrast: Enteric contrast was administered  FINDINGS: CHEST LUNGS:  There is a stable 2 mm noncalcified right lower lobe pulmonary nodule on image 48 series 2  There is a stable 1 mm linear type nodular density on image 45 at the right base  In the subpleural location of the left lower lobe on image 26 series 2 there is hemispheric shaped nodular density which is 4 x 6 mm  This is new since the previous exam   There is a stable tiny subpleural nodule laterally in the left lower lobe on image 44 measuring only a couple millimeters  No suspicious infiltrates or dominant masses or airway obstructions are seen  PLEURA:  No effusion or pneumothorax HEART/GREAT VESSELS:  Unremarkable for patient's age  MEDIASTINUM AND ROMEO:  Unremarkable  CHEST WALL AND LOWER NECK:   The patient's breasts are noticeably larger and denser than on the previous exam with significantly more glandular appearing tissue bilaterally  Correlate with hormonal status  Within both axillary regions, there are subcutaneous nodular opacities which are new  These are indeterminate in etiology but seem to be associated potentially with some mild skin thickening and might be an inflammatory process or sebaceous cysts  Additionally, there is a slightly enlarged right-sided subpectoral lymph node which is new from previous exam measuring 10 x 17 mm  Other adjacent small lymph nodes are also noted  There are no obvious breast masses appreciated  ABDOMEN LIVER/BILIARY TREE:  Unremarkable  GALLBLADDER:  No calcified gallstones   No pericholecystic inflammatory change  SPLEEN:  Unremarkable  PANCREAS:  Unremarkable  ADRENAL GLANDS:  Unremarkable  KIDNEYS/URETERS:  Unremarkable  No hydronephrosis  STOMACH AND BOWEL:  Unremarkable  APPENDIX:  Absent  No evidence of mass in the right lower quadrant  ABDOMINOPELVIC CAVITY:  There is no ascites or peritoneal carcinomatosis visible  There is no free air  There is no suspicious lymphadenopathy  VESSELS:  Circumaortic left renal vein is noted  The aorta is normal caliber  No acute vascular pathology seen  PELVIS REPRODUCTIVE ORGANS:  Unremarkable for patient's age  URINARY BLADDER:  Unremarkable  ABDOMINAL WALL/INGUINAL REGIONS:  Unremarkable  OSSEOUS STRUCTURES:  Stable compared with prior  Impression: There is a new subpleural left lower lobe nodular density measuring 4 x 6 mm on image 26 series 2  This is most likely a benign finding but probably warrants reevaluation in 3-6 months with repeat chest CT  Other pre-existing tiny scattered lung nodules are stable  There are subcutaneous nodular densities in the axillary regions and there is mild right axillary lymphadenopathy  Findings may represent sebaceous cysts or other small areas of infectious or inflammatory change which could be correlated with physical exam and history  Both breasts appear larger and much denser than on the prior CT but no intrinsic breast mass is visible  This might represent change in hormonal status  The abdomen and pelvis are stable and unremarkable  Workstation performed: [id number]     I reviewed the above laboratory and imaging data  Discussion/Summary:   57-year-old female status post laparoscopic appendectomy for low-grade mucinous appendiceal neoplasm  There is no evidence of recurrence by imaging, symptomatology, or physical exam  Her CEA level is normal  I did discuss that the lung nodules I suspect are completely benign  The patient is not a smoker  I discussed repeating the CT in 6 months verses 1 year    I think repeating this in 1 year is reasonable  We will schedule her repeat CT and CEA level for 1 year  I will see her again in one year with repeat imaging and a CEA level  If she develops any chest symptoms in the interim we will obtain the CT sooner  Assuming her neck CT and CEA are normal, we will space out her imaging to every 2 years  She is agreeable to this  All her questions were answered

## 2018-11-01 ENCOUNTER — OFFICE VISIT (OUTPATIENT)
Dept: INTERNAL MEDICINE CLINIC | Facility: CLINIC | Age: 38
End: 2018-11-01
Payer: COMMERCIAL

## 2018-11-01 VITALS
OXYGEN SATURATION: 98 % | WEIGHT: 202.8 LBS | SYSTOLIC BLOOD PRESSURE: 112 MMHG | BODY MASS INDEX: 30.74 KG/M2 | HEIGHT: 68 IN | RESPIRATION RATE: 16 BRPM | DIASTOLIC BLOOD PRESSURE: 62 MMHG | HEART RATE: 56 BPM

## 2018-11-01 DIAGNOSIS — Z34.93 SUPERVISION OF LOW-RISK PREGNANCY, THIRD TRIMESTER: ICD-10-CM

## 2018-11-01 DIAGNOSIS — E55.9 VITAMIN D DEFICIENCY: ICD-10-CM

## 2018-11-01 DIAGNOSIS — E78.5 HYPERLIPIDEMIA, UNSPECIFIED HYPERLIPIDEMIA TYPE: Primary | ICD-10-CM

## 2018-11-01 DIAGNOSIS — F41.9 ANXIETY: ICD-10-CM

## 2018-11-01 DIAGNOSIS — E66.09 CLASS 1 OBESITY DUE TO EXCESS CALORIES WITHOUT SERIOUS COMORBIDITY WITH BODY MASS INDEX (BMI) OF 30.0 TO 30.9 IN ADULT: ICD-10-CM

## 2018-11-01 PROBLEM — E66.811 CLASS 1 OBESITY DUE TO EXCESS CALORIES WITHOUT SERIOUS COMORBIDITY WITH BODY MASS INDEX (BMI) OF 30.0 TO 30.9 IN ADULT: Status: ACTIVE | Noted: 2018-11-01

## 2018-11-01 PROCEDURE — 1036F TOBACCO NON-USER: CPT | Performed by: INTERNAL MEDICINE

## 2018-11-01 PROCEDURE — 3008F BODY MASS INDEX DOCD: CPT | Performed by: INTERNAL MEDICINE

## 2018-11-01 PROCEDURE — 99214 OFFICE O/P EST MOD 30 MIN: CPT | Performed by: INTERNAL MEDICINE

## 2018-11-01 NOTE — PROGRESS NOTES
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 will check a lipid profile in 4 months if we do not see significant improvement we will consider lipid Clinic         Relevant Orders    Lipid Panel with Direct LDL reflex    Vitamin D deficiency     Will check a vitamin-D level         Relevant Orders    Vitamin D 25 hydroxy    Class 1 obesity due to excess calories without serious comorbidity with body mass index (BMI) of 30 0 to 30 9 in adult     Obesity -I have counseled patient following healthy and balanced diet, I would like the patient to lose weight, I would like the patient exercise routinely; we will continue monitor the patient's progress  RESOLVED: Supervision of low-risk pregnancy, third trimester          Return to office 6  months  call if any problems  Subjective:      Patient ID: Gianni Sarah is a 41 y o  female  HPI 42-year old female coming in for a follow up office visit regarding hyperlipidemia, vitamin-D deficiency, anxiety, obesity; the patient reports me overall she is feeling well she has just had a  baby 3 months ago 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  Overall she is feeling well she reports me her diet was not as good as usual during the pregnancy and she has not been exercising as usual   She is just starting to get back to the gym  She has been to surgical Oncology and the CT scan is currently stable she will be seen and 2 years    the pt reports si joint pain with the pregnancy the pt worked witih chiro sin ce febuary she needed to stop running b no problems with the labor ;external hemmoroid using mirilax  amxity improving with the gym no is  Breast feeding   Gym 3/ week [facility] gym, diet tough; she does report me she is trying to follow a very healthy diet    The following portions of the patient's history were reviewed and updated as appropriate: allergies, current medications, past family history, past medical history, past social history, past surgical history and problem list     Review of Systems   Constitutional: Negative for activity change, appetite change and unexpected weight change  HENT: Negative for congestion and postnasal drip  Eyes: Negative for visual disturbance  Respiratory: Negative for cough and shortness of breath  Cardiovascular: Negative for chest pain  Gastrointestinal: Negative for abdominal pain, diarrhea, nausea and vomiting  Neurological: Negative for dizziness, light-headedness and headaches  Hematological: Negative for adenopathy  Psychiatric/Behavioral: The patient is not nervous/anxious  Objective:                    No Follow-up on file        Allergies   Allergen Reactions    Seasonal Ic [Cholestatin] Allergic Rhinitis    Amoxicillin Hives    Augmentin [Amoxicillin-Pot Clavulanate] Hives    Fluoxetine Other (See Comments)     Other reaction(s): bloating    Penicillins Rash    Wound Dressings Rash       Past Medical History:   Diagnosis Date    Anxiety     last assessed - [id number]    Hyperlipidemia     last assessed - [id number]    IBS (irritable bowel syndrome)     Low grade mucinous neoplasm of appendix     last assessed - [id number]    Nausea     Normal delivery     2014 daughter   Hermila Pall Palpitations     last assessed - [id number]    Right bundle branch block     last assessed - [id number]    Thyroid cyst     last assessed - [id number]    Vacuum extractor delivery, delivered     2012 daughter    Varicella     childhood    Vitamin D deficiency      Past Surgical History:   Procedure Laterality Date    APPENDECTOMY      COLPOSCOPY W/ BIOPSY / CURETTAGE      Colposcopy Cervix with Biopsy(s)    LACRIMAL DUCT PROBING      surgery of the Lacrimal system    NM COLONOSCOPY FLX DX W/COLLJ SPEC WHEN PFRMD N/A 2/15/2016    Procedure: EGD AND COLONOSCOPY;  Surgeon: Jahaira Healy DO;  Location:  GI LAB; Service: General    SUBTOTAL COLECTOMY      Partial colectomy - with resection of appendix    TEAR DUCT SURGERY      TOOTH EXTRACTION      last assessed - [id number]    TUMOR REMOVAL  12/2015    removed from appendix     Current Outpatient Prescriptions on File Prior to Visit   Medication Sig Dispense Refill    Cholecalciferol (VITAMIN D) 2000 units CAPS Take 3,000 Units by mouth        Polyethylene Glycol 3350 (MIRALAX PO) Take 1 packet by mouth daily as needed   Prenatal Multivit-Min-Fe-FA (PRENATAL VITAMINS) 0 8 MG tablet Take 1 tablet by mouth daily      witch hazel-glycerin (TUCKS) topical pad Apply 1 pad topically as needed for irritation 40 each 0     No current facility-administered medications on file prior to visit        Family History   Problem Relation Age of Onset    Heart murmur Mother         type unspecified     Cancer Mother         thyroid    Hyperlipidemia Father     Hypertension Father     Arthritis Maternal Grandmother     Coronary artery disease Maternal Grandmother     Transient ischemic attack Maternal Grandmother     Cancer Maternal Grandmother         thyroid    Hypertension Maternal Grandmother     Stroke Maternal Grandmother     Depression Paternal Grandmother     Cancer Maternal Aunt         lung    Lake syndrome Paternal Aunt     Cancer Paternal Aunt         lymphoma    Depression Sister     Cancer Paternal Grandfather         bowel    Heart disease Paternal Grandfather     Hyperlipidemia Paternal Grandfather     Hypertension Paternal Grandfather     Heart defect Maternal Aunt      Social History     Social History    Marital status: /Civil Union     Spouse name: N/A  Number of children: N/A    Years of education: N/A     Occupational History    Not on file  Social History Main Topics    Smoking status: Never Smoker    Smokeless tobacco: Never Used    Alcohol use Yes      Comment: rarely; drinks beer; social alcohol use    Drug use: No    Sexual activity: Yes     Birth control/ protection: Pill     Other Topics Concern    Not on file     Social History Narrative    Activities: Soccer    Always uses seat belt    Drinks coffee    Exercise: Running    Exercises moderately less than 3 times a week             Vitals:    11/01/18 1429   BP: 112/62   BP Location: Left arm   Patient Position: Sitting   Cuff Size: Standard   Pulse: 56   Resp: 16   SpO2: 98%   Weight: 92 kg (202 lb 12 8 oz)   Height: 5' 8" (1 727 m)     Results for orders placed or performed in visit on 09/12/18   Lipid panel   Result Value Ref Range    Cholesterol 242 (H) 50 - 200 mg/dL    Triglycerides 65 <=150 mg/dL    HDL, Direct 53 40 - 60 mg/dL    LDL Calculated 176 (H) 0 - 100 mg/dL    Non-HDL-Chol (CHOL-HDL) 189 mg/dl   BUN   Result Value Ref Range    BUN 14 5 - 25 mg/dL   Creatinine, serum   Result Value Ref Range    Creatinine 0 69 0 60 - 1 30 mg/dL    eGFR 112 ml/min/1 73sq m   CEA   Result Value Ref Range    CEA <0 5 0 0 - 3 0 ng/mL     Weight (last 2 days)     Date/Time   Weight    11/01/18 1429  92 (202 8)            Body mass index is 30 84 kg/m²  BP      Temp      Pulse     Resp      SpO2        Vitals:    11/01/18 1429   Weight: 92 kg (202 lb 12 8 oz)     Vitals:    11/01/18 1429   Weight: 92 kg (202 lb 12 8 oz)       /62 (BP Location: Left arm, Patient Position: Sitting, Cuff Size: Standard)   Pulse 56   Resp 16   Ht 5' 8" (1 727 m)   Wt 92 kg (202 lb 12 8 oz)   SpO2 98%   BMI 30 84 kg/m²          Physical Exam   Constitutional: She appears well-developed and well-nourished  HENT:   Head: Normocephalic     Mouth/Throat: Oropharynx is clear and moist    Eyes: Pupils are equal, round, and reactive to light  Conjunctivae are normal  Right eye exhibits no discharge  Left eye exhibits no discharge  No scleral icterus  Neck: Neck supple  Cardiovascular: Normal rate, regular rhythm, normal heart sounds and intact distal pulses  Exam reveals no gallop and no friction rub  No murmur heard  Pulmonary/Chest: Breath sounds normal  No respiratory distress  She has no wheezes  She has no rales  Abdominal: Soft  Bowel sounds are normal  She exhibits no distension and no mass  There is no tenderness  There is no rebound and no guarding  Musculoskeletal: She exhibits no edema or deformity  Lymphadenopathy:     She has no cervical adenopathy  Neurological: She is alert  Coordination normal    Psychiatric: Her mood appears not anxious  She does not exhibit a depressed mood

## 2018-11-02 NOTE — ASSESSMENT & PLAN NOTE
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

## 2019-02-13 ENCOUNTER — ANNUAL EXAM (OUTPATIENT)
Dept: OBGYN CLINIC | Facility: CLINIC | Age: 39
End: 2019-02-13
Payer: COMMERCIAL

## 2019-02-13 VITALS
SYSTOLIC BLOOD PRESSURE: 116 MMHG | DIASTOLIC BLOOD PRESSURE: 72 MMHG | BODY MASS INDEX: 28.79 KG/M2 | HEIGHT: 68 IN | WEIGHT: 190 LBS

## 2019-02-13 DIAGNOSIS — Z01.419 ENCOUNTER FOR GYNECOLOGICAL EXAMINATION WITHOUT ABNORMAL FINDING: Primary | ICD-10-CM

## 2019-02-13 PROCEDURE — S0612 ANNUAL GYNECOLOGICAL EXAMINA: HCPCS | Performed by: PHYSICIAN ASSISTANT

## 2019-02-13 NOTE — PROGRESS NOTES
David Freedman  1980      CC:  Yearly exam    S:  41 y o  female here for yearly exam  Her cycles are regular, not heavy or crampy  She is sexually active  She uses vasectomy (condoms until he is cleared) for contraception  Her 3 children are doing well  Her sister has infertility and her mother wants Charmayne Duster to carry a child for her sister; she is not interested in this, but her mother has been persistent  We discussed the risks of AMA  Last Pap 6/22/16 neg/neg    Current Outpatient Medications:     Cholecalciferol (VITAMIN D) 2000 units CAPS, Take 3,000 Units by mouth  , Disp: , Rfl:     Polyethylene Glycol 3350 (MIRALAX PO), Take 1 packet by mouth daily as needed  , Disp: , Rfl:     Prenatal Multivit-Min-Fe-FA (PRENATAL VITAMINS) 0 8 MG tablet, Take 1 tablet by mouth daily, Disp: , Rfl:   Social History     Socioeconomic History    Marital status: /Civil Union     Spouse name: Not on file    Number of children: Not on file    Years of education: Not on file    Highest education level: Not on file   Occupational History    Not on file   Social Needs    Financial resource strain: Not on file    Food insecurity:     Worry: Not on file     Inability: Not on file    Transportation needs:     Medical: Not on file     Non-medical: Not on file   Tobacco Use    Smoking status: Never Smoker    Smokeless tobacco: Never Used   Substance and Sexual Activity    Alcohol use: Yes     Comment: rarely; drinks beer; social alcohol use    Drug use: No    Sexual activity: Yes     Partners: Male     Birth control/protection: Male Sterilization   Lifestyle    Physical activity:     Days per week: Not on file     Minutes per session: Not on file    Stress: Not on file   Relationships    Social connections:     Talks on phone: Not on file     Gets together: Not on file     Attends [employer] service: Not on file     Active member of club or organization: Not on file     Attends meetings of clubs or organizations: Not on file     Relationship status: Not on file    Intimate partner violence:     Fear of current or ex partner: Not on file     Emotionally abused: Not on file     Physically abused: Not on file     Forced sexual activity: Not on file   Other Topics Concern    Not on file   Social History Narrative    Activities: Soccer    Always uses seat belt    Drinks coffee    Exercise: Running    Exercises moderately less than 3 times a week         Family History   Problem Relation Age of Onset    Heart murmur Mother         type unspecified     Cancer Mother         thyroid    Hyperlipidemia Father     Hypertension Father     Arthritis Maternal Grandmother     Coronary artery disease Maternal Grandmother     Transient ischemic attack Maternal Grandmother     Cancer Maternal Grandmother         thyroid    Hypertension Maternal Grandmother     Stroke Maternal Grandmother     Depression Paternal Grandmother     Cancer Maternal Aunt         lung    Lake syndrome Paternal Aunt     Cancer Paternal Aunt         lymphoma    Depression Sister     Cancer Paternal Grandfather         bowel    Heart disease Paternal Grandfather     Hyperlipidemia Paternal Grandfather     Hypertension Paternal Grandfather     Heart defect Maternal Aunt       Past Medical History:   Diagnosis Date    Anxiety     last assessed - 19Jan2018    Hyperlipidemia     last assessed - [id number]    IBS (irritable bowel syndrome)     Low grade mucinous neoplasm of appendix     last assessed - [id number]    Nausea     Normal delivery     2014 daughter   [facility] Palpitations     last assessed - [id number]    Right bundle branch block     last assessed - [id number]    Thyroid cyst     last assessed - [id number]    Vacuum extractor delivery, delivered     2012 daughter    Varicella     childhood    Vitamin D deficiency         O:  Blood pressure 116/72, height 5' 8" (1 727 m), weight 86 2 kg (190 lb), currently breastfeeding  Patient appears well and is not in distress  Neck is supple without masses  Breasts are symmetrical without mass, tenderness, nipple discharge, skin changes or adenopathy  Abdomen is soft and nontender without masses  External genitals are normal without lesions or rashes  Vagina is normal without discharge or bleeding  Cervix is normal without discharge or lesion  Uterus is normal, mobile, nontender without palpable mass  Adnexa are normal, nontender, without palpable mass  A:  Yearly exam      P:   Pap due 2021   RTO one year for yearly exam or sooner as needed

## 2019-02-18 ENCOUNTER — APPOINTMENT (OUTPATIENT)
Dept: RADIOLOGY | Age: 39
End: 2019-02-18
Payer: COMMERCIAL

## 2019-02-18 ENCOUNTER — OFFICE VISIT (OUTPATIENT)
Dept: URGENT CARE | Age: 39
End: 2019-02-18
Payer: COMMERCIAL

## 2019-02-18 VITALS
BODY MASS INDEX: 28.79 KG/M2 | SYSTOLIC BLOOD PRESSURE: 121 MMHG | OXYGEN SATURATION: 98 % | WEIGHT: 190 LBS | HEART RATE: 77 BPM | HEIGHT: 68 IN | DIASTOLIC BLOOD PRESSURE: 86 MMHG | RESPIRATION RATE: 18 BRPM | TEMPERATURE: 98.1 F

## 2019-02-18 DIAGNOSIS — S92.515A CLOSED NONDISPLACED FRACTURE OF PROXIMAL PHALANX OF LESSER TOE OF LEFT FOOT, INITIAL ENCOUNTER: Primary | ICD-10-CM

## 2019-02-18 DIAGNOSIS — S99.922A INJURY OF LEFT FOOT, INITIAL ENCOUNTER: ICD-10-CM

## 2019-02-18 PROCEDURE — 73630 X-RAY EXAM OF FOOT: CPT

## 2019-02-18 PROCEDURE — S9083 URGENT CARE CENTER GLOBAL: HCPCS | Performed by: FAMILY MEDICINE

## 2019-02-18 PROCEDURE — G0382 LEV 3 HOSP TYPE B ED VISIT: HCPCS | Performed by: FAMILY MEDICINE

## 2019-02-18 NOTE — PATIENT INSTRUCTIONS
There appears to be a fracture of the toe  Will take toes  Use heart shoe as given  Ice every 3-4 hours for 20 minutes  Motrin as needed for pain  Follow up with Orthopedics or Podiatry  Go to the ER with worsening symptoms or pain

## 2019-02-18 NOTE — PROGRESS NOTES
[facility]        NAME: Miguel Soto is a 41 y o  female  : 1980    MRN: [id number]  DATE: 2019  TIME: 4:57 PM    Assessment and Plan   Closed nondisplaced fracture of proximal phalanx of lesser toe of left foot, initial encounter [S92 515A]  1  Closed nondisplaced fracture of proximal phalanx of lesser toe of left foot, initial encounter     2  Injury of left foot, initial encounter  XR foot 3+ vw left         Patient Instructions     Patient Instructions   There appears to be a fracture of the toe  Will take toes  Use heart shoe as given  Ice every 3-4 hours for 20 minutes  Motrin as needed for pain  Follow up with Orthopedics or Podiatry  Go to the ER with worsening symptoms or pain  Chief Complaint     Chief Complaint   Patient presents with    Foot Pain     pt stubbed left 5th digit this am           History of Present Illness   Miguel Soto presents to the clinic c/o    This is a 24-year-old female here today with complaints of left 5th toe pain  She states she stubbed her toe on her son's bed  She has noticed bruising swelling and pain over toe  She states she is unsure of injury to the foot but did play sports as a child so it seems that she apply did have some type of injury  She is able to walk  Review of Systems   Review of Systems   Constitutional: Negative  HENT: Negative  Respiratory: Negative  Cardiovascular: Negative  Musculoskeletal: Positive for arthralgias  Neurological: Negative  Psychiatric/Behavioral: Negative            Current Medications     Long-Term Medications   Medication Sig Dispense Refill    Cholecalciferol (VITAMIN D) 2000 units CAPS Take 3,000 Units by mouth        Prenatal Multivit-Min-Fe-FA (PRENATAL VITAMINS) 0 8 MG tablet Take 1 tablet by mouth daily         Current Allergies     Allergies as of 2019 - Reviewed 2019   Allergen Reaction Noted    Seasonal ic [cholestatin] Allergic Rhinitis 06/20/2014    Amoxicillin Hives 02/15/2016    Augmentin [amoxicillin-pot clavulanate] Hives 02/15/2016    Fluoxetine Other (See Comments) 05/08/2014    Penicillins Rash 06/03/2014    Wound dressings Rash 02/08/2014            The following portions of the patient's history were reviewed and updated as appropriate: allergies, current medications, past family history, past medical history, past social history, past surgical history and problem list     Objective   /86 (BP Location: Left arm, Patient Position: Sitting)   Pulse 77   Temp 98 1 °F (36 7 °C)   Resp 18   Ht 5' 8" (1 727 m)   Wt 86 2 kg (190 lb)   SpO2 98%   BMI 28 89 kg/m²        Physical Exam     Physical Exam   Constitutional: She is oriented to person, place, and time  She appears well-developed and well-nourished  Pulmonary/Chest: Effort normal    Musculoskeletal:   Left foot:  Tenderness to palpate over the entire 5th toe  Bruising over the entire 5th toe noted  Mild swelling noted  Neurological: She is alert and oriented to person, place, and time  Nursing note and vitals reviewed      Left foot x-ray:  Acute fracture of the proximal phalanx at the distal aspect

## 2019-04-29 ENCOUNTER — APPOINTMENT (OUTPATIENT)
Dept: LAB | Age: 39
End: 2019-04-29
Payer: COMMERCIAL

## 2019-04-29 DIAGNOSIS — E55.9 VITAMIN D DEFICIENCY: ICD-10-CM

## 2019-04-29 DIAGNOSIS — E78.5 HYPERLIPIDEMIA, UNSPECIFIED HYPERLIPIDEMIA TYPE: ICD-10-CM

## 2019-04-29 LAB
25(OH)D3 SERPL-MCNC: 33.2 NG/ML (ref 30–100)
CHOLEST SERPL-MCNC: 250 MG/DL (ref 50–200)
HDLC SERPL-MCNC: 68 MG/DL (ref 40–60)
LDLC SERPL CALC-MCNC: 173 MG/DL (ref 0–100)
TRIGL SERPL-MCNC: 47 MG/DL

## 2019-04-29 PROCEDURE — 36415 COLL VENOUS BLD VENIPUNCTURE: CPT

## 2019-04-29 PROCEDURE — 82306 VITAMIN D 25 HYDROXY: CPT

## 2019-04-29 PROCEDURE — 80061 LIPID PANEL: CPT

## 2019-05-03 ENCOUNTER — OFFICE VISIT (OUTPATIENT)
Dept: INTERNAL MEDICINE CLINIC | Facility: CLINIC | Age: 39
End: 2019-05-03
Payer: COMMERCIAL

## 2019-05-03 VITALS
OXYGEN SATURATION: 99 % | HEIGHT: 68 IN | HEART RATE: 75 BPM | WEIGHT: 179.6 LBS | BODY MASS INDEX: 27.22 KG/M2 | SYSTOLIC BLOOD PRESSURE: 124 MMHG | DIASTOLIC BLOOD PRESSURE: 74 MMHG | RESPIRATION RATE: 16 BRPM

## 2019-05-03 DIAGNOSIS — M25.562 PAIN IN BOTH KNEES, UNSPECIFIED CHRONICITY: ICD-10-CM

## 2019-05-03 DIAGNOSIS — E55.9 VITAMIN D DEFICIENCY: ICD-10-CM

## 2019-05-03 DIAGNOSIS — M25.551 PAIN OF BOTH HIP JOINTS: ICD-10-CM

## 2019-05-03 DIAGNOSIS — M25.552 PAIN OF BOTH HIP JOINTS: ICD-10-CM

## 2019-05-03 DIAGNOSIS — F41.9 ANXIETY: ICD-10-CM

## 2019-05-03 DIAGNOSIS — E78.5 HYPERLIPIDEMIA, UNSPECIFIED HYPERLIPIDEMIA TYPE: Primary | ICD-10-CM

## 2019-05-03 DIAGNOSIS — R00.2 PALPITATIONS: ICD-10-CM

## 2019-05-03 DIAGNOSIS — M25.561 PAIN IN BOTH KNEES, UNSPECIFIED CHRONICITY: ICD-10-CM

## 2019-05-03 PROCEDURE — 99214 OFFICE O/P EST MOD 30 MIN: CPT | Performed by: INTERNAL MEDICINE

## 2019-05-05 PROBLEM — R07.89 ATYPICAL CHEST PAIN: Status: ACTIVE | Noted: 2019-05-05

## 2019-05-05 PROBLEM — M25.561 PAIN IN BOTH KNEES: Status: ACTIVE | Noted: 2019-05-05

## 2019-05-05 PROBLEM — R00.2 PALPITATIONS: Status: ACTIVE | Noted: 2019-05-05

## 2019-05-05 PROBLEM — M25.562 PAIN IN BOTH KNEES: Status: ACTIVE | Noted: 2019-05-05

## 2019-05-16 ENCOUNTER — EVALUATION (OUTPATIENT)
Dept: PHYSICAL THERAPY | Facility: REHABILITATION | Age: 39
End: 2019-05-16
Payer: COMMERCIAL

## 2019-05-16 DIAGNOSIS — M25.551 PAIN OF BOTH HIP JOINTS: Primary | ICD-10-CM

## 2019-05-16 DIAGNOSIS — M25.561 PAIN IN BOTH KNEES, UNSPECIFIED CHRONICITY: ICD-10-CM

## 2019-05-16 DIAGNOSIS — M25.562 PAIN IN BOTH KNEES, UNSPECIFIED CHRONICITY: ICD-10-CM

## 2019-05-16 DIAGNOSIS — M25.552 PAIN OF BOTH HIP JOINTS: Primary | ICD-10-CM

## 2019-05-16 PROCEDURE — 97162 PT EVAL MOD COMPLEX 30 MIN: CPT | Performed by: PHYSICAL THERAPIST

## 2019-05-16 PROCEDURE — 97110 THERAPEUTIC EXERCISES: CPT | Performed by: PHYSICAL THERAPIST

## 2019-05-22 ENCOUNTER — OFFICE VISIT (OUTPATIENT)
Dept: PHYSICAL THERAPY | Facility: REHABILITATION | Age: 39
End: 2019-05-22
Payer: COMMERCIAL

## 2019-05-22 DIAGNOSIS — M25.562 PAIN IN BOTH KNEES, UNSPECIFIED CHRONICITY: Primary | ICD-10-CM

## 2019-05-22 DIAGNOSIS — M25.552 PAIN OF BOTH HIP JOINTS: ICD-10-CM

## 2019-05-22 DIAGNOSIS — M25.561 PAIN IN BOTH KNEES, UNSPECIFIED CHRONICITY: Primary | ICD-10-CM

## 2019-05-22 DIAGNOSIS — M25.551 PAIN OF BOTH HIP JOINTS: ICD-10-CM

## 2019-05-22 PROCEDURE — 97140 MANUAL THERAPY 1/> REGIONS: CPT

## 2019-05-22 PROCEDURE — 97110 THERAPEUTIC EXERCISES: CPT

## 2019-05-22 PROCEDURE — 97112 NEUROMUSCULAR REEDUCATION: CPT

## 2019-05-24 ENCOUNTER — OFFICE VISIT (OUTPATIENT)
Dept: PHYSICAL THERAPY | Facility: REHABILITATION | Age: 39
End: 2019-05-24
Payer: COMMERCIAL

## 2019-05-24 DIAGNOSIS — M25.552 PAIN OF BOTH HIP JOINTS: ICD-10-CM

## 2019-05-24 DIAGNOSIS — M25.561 PAIN IN BOTH KNEES, UNSPECIFIED CHRONICITY: Primary | ICD-10-CM

## 2019-05-24 DIAGNOSIS — M25.562 PAIN IN BOTH KNEES, UNSPECIFIED CHRONICITY: Primary | ICD-10-CM

## 2019-05-24 DIAGNOSIS — M25.551 PAIN OF BOTH HIP JOINTS: ICD-10-CM

## 2019-05-24 PROCEDURE — 97140 MANUAL THERAPY 1/> REGIONS: CPT

## 2019-05-24 PROCEDURE — 97110 THERAPEUTIC EXERCISES: CPT

## 2019-05-24 PROCEDURE — 97112 NEUROMUSCULAR REEDUCATION: CPT

## 2019-05-24 PROCEDURE — 97530 THERAPEUTIC ACTIVITIES: CPT

## 2019-05-28 ENCOUNTER — OFFICE VISIT (OUTPATIENT)
Dept: PHYSICAL THERAPY | Facility: REHABILITATION | Age: 39
End: 2019-05-28
Payer: COMMERCIAL

## 2019-05-28 DIAGNOSIS — M25.552 PAIN OF BOTH HIP JOINTS: ICD-10-CM

## 2019-05-28 DIAGNOSIS — M25.561 PAIN IN BOTH KNEES, UNSPECIFIED CHRONICITY: Primary | ICD-10-CM

## 2019-05-28 DIAGNOSIS — M25.551 PAIN OF BOTH HIP JOINTS: ICD-10-CM

## 2019-05-28 DIAGNOSIS — M25.562 PAIN IN BOTH KNEES, UNSPECIFIED CHRONICITY: Primary | ICD-10-CM

## 2019-05-28 PROCEDURE — 97110 THERAPEUTIC EXERCISES: CPT

## 2019-05-28 PROCEDURE — 97530 THERAPEUTIC ACTIVITIES: CPT

## 2019-05-28 PROCEDURE — 97140 MANUAL THERAPY 1/> REGIONS: CPT

## 2019-05-28 PROCEDURE — 97112 NEUROMUSCULAR REEDUCATION: CPT

## 2019-05-31 ENCOUNTER — OFFICE VISIT (OUTPATIENT)
Dept: PHYSICAL THERAPY | Facility: REHABILITATION | Age: 39
End: 2019-05-31
Payer: COMMERCIAL

## 2019-05-31 DIAGNOSIS — M25.552 PAIN OF BOTH HIP JOINTS: ICD-10-CM

## 2019-05-31 DIAGNOSIS — M25.561 PAIN IN BOTH KNEES, UNSPECIFIED CHRONICITY: Primary | ICD-10-CM

## 2019-05-31 DIAGNOSIS — M25.562 PAIN IN BOTH KNEES, UNSPECIFIED CHRONICITY: Primary | ICD-10-CM

## 2019-05-31 DIAGNOSIS — M25.551 PAIN OF BOTH HIP JOINTS: ICD-10-CM

## 2019-05-31 PROCEDURE — 97110 THERAPEUTIC EXERCISES: CPT

## 2019-05-31 PROCEDURE — 97112 NEUROMUSCULAR REEDUCATION: CPT

## 2019-05-31 PROCEDURE — 97530 THERAPEUTIC ACTIVITIES: CPT

## 2019-05-31 PROCEDURE — 97140 MANUAL THERAPY 1/> REGIONS: CPT

## 2019-06-04 ENCOUNTER — OFFICE VISIT (OUTPATIENT)
Dept: PHYSICAL THERAPY | Facility: REHABILITATION | Age: 39
End: 2019-06-04
Payer: COMMERCIAL

## 2019-06-04 DIAGNOSIS — M25.561 PAIN IN BOTH KNEES, UNSPECIFIED CHRONICITY: Primary | ICD-10-CM

## 2019-06-04 DIAGNOSIS — M25.551 PAIN OF BOTH HIP JOINTS: ICD-10-CM

## 2019-06-04 DIAGNOSIS — M25.562 PAIN IN BOTH KNEES, UNSPECIFIED CHRONICITY: Primary | ICD-10-CM

## 2019-06-04 DIAGNOSIS — M25.552 PAIN OF BOTH HIP JOINTS: ICD-10-CM

## 2019-06-04 PROCEDURE — 97112 NEUROMUSCULAR REEDUCATION: CPT

## 2019-06-04 PROCEDURE — 97110 THERAPEUTIC EXERCISES: CPT

## 2019-06-04 PROCEDURE — 97530 THERAPEUTIC ACTIVITIES: CPT

## 2019-06-07 ENCOUNTER — APPOINTMENT (OUTPATIENT)
Dept: PHYSICAL THERAPY | Facility: REHABILITATION | Age: 39
End: 2019-06-07
Payer: COMMERCIAL

## 2019-06-10 ENCOUNTER — OFFICE VISIT (OUTPATIENT)
Dept: PHYSICAL THERAPY | Facility: REHABILITATION | Age: 39
End: 2019-06-10
Payer: COMMERCIAL

## 2019-06-10 DIAGNOSIS — M25.561 PAIN IN BOTH KNEES, UNSPECIFIED CHRONICITY: Primary | ICD-10-CM

## 2019-06-10 DIAGNOSIS — M25.552 PAIN OF BOTH HIP JOINTS: ICD-10-CM

## 2019-06-10 DIAGNOSIS — M25.562 PAIN IN BOTH KNEES, UNSPECIFIED CHRONICITY: Primary | ICD-10-CM

## 2019-06-10 DIAGNOSIS — M25.551 PAIN OF BOTH HIP JOINTS: ICD-10-CM

## 2019-06-10 PROCEDURE — 97112 NEUROMUSCULAR REEDUCATION: CPT

## 2019-06-10 PROCEDURE — 97530 THERAPEUTIC ACTIVITIES: CPT

## 2019-06-10 PROCEDURE — 97110 THERAPEUTIC EXERCISES: CPT

## 2019-06-12 ENCOUNTER — HOSPITAL ENCOUNTER (OUTPATIENT)
Dept: NON INVASIVE DIAGNOSTICS | Facility: CLINIC | Age: 39
Discharge: HOME | End: 2019-06-12
Payer: COMMERCIAL

## 2019-06-12 DIAGNOSIS — R00.2 PALPITATIONS: ICD-10-CM

## 2019-06-12 PROCEDURE — 93226 XTRNL ECG REC<48 HR SCAN A/R: CPT

## 2019-06-12 PROCEDURE — 93017 CV STRESS TEST TRACING ONLY: CPT

## 2019-06-12 PROCEDURE — 93225 XTRNL ECG REC<48 HRS REC: CPT

## 2019-06-13 LAB
CHEST PAIN STATEMENT: NORMAL
MAX DIASTOLIC BP: 82 MMHG
MAX HEART RATE: 176 BPM
MAX PREDICTED HEART RATE: 183 BPM
MAX. SYSTOLIC BP: 166 MMHG
PROTOCOL NAME: NORMAL
REASON FOR TERMINATION: NORMAL
TARGET HR FORMULA: NORMAL
TEST INDICATION: NORMAL
TIME IN EXERCISE PHASE: NORMAL

## 2019-06-14 ENCOUNTER — OFFICE VISIT (OUTPATIENT)
Dept: PHYSICAL THERAPY | Facility: REHABILITATION | Age: 39
End: 2019-06-14
Payer: COMMERCIAL

## 2019-06-14 DIAGNOSIS — M25.561 PAIN IN BOTH KNEES, UNSPECIFIED CHRONICITY: Primary | ICD-10-CM

## 2019-06-14 DIAGNOSIS — M25.552 PAIN OF BOTH HIP JOINTS: ICD-10-CM

## 2019-06-14 DIAGNOSIS — M25.551 PAIN OF BOTH HIP JOINTS: ICD-10-CM

## 2019-06-14 DIAGNOSIS — M25.562 PAIN IN BOTH KNEES, UNSPECIFIED CHRONICITY: Primary | ICD-10-CM

## 2019-06-14 PROCEDURE — 97112 NEUROMUSCULAR REEDUCATION: CPT

## 2019-06-14 PROCEDURE — 97110 THERAPEUTIC EXERCISES: CPT

## 2019-06-17 PROCEDURE — 93227 XTRNL ECG REC<48 HR R&I: CPT | Performed by: INTERNAL MEDICINE

## 2019-06-17 PROCEDURE — 93016 CV STRESS TEST SUPVJ ONLY: CPT | Performed by: INTERNAL MEDICINE

## 2019-06-17 PROCEDURE — 93018 CV STRESS TEST I&R ONLY: CPT | Performed by: INTERNAL MEDICINE

## 2019-06-21 ENCOUNTER — OFFICE VISIT (OUTPATIENT)
Dept: INTERNAL MEDICINE CLINIC | Facility: CLINIC | Age: 39
End: 2019-06-21
Payer: COMMERCIAL

## 2019-06-21 ENCOUNTER — OFFICE VISIT (OUTPATIENT)
Dept: PHYSICAL THERAPY | Facility: REHABILITATION | Age: 39
End: 2019-06-21
Payer: COMMERCIAL

## 2019-06-21 VITALS
HEART RATE: 65 BPM | WEIGHT: 182 LBS | HEIGHT: 68 IN | BODY MASS INDEX: 27.58 KG/M2 | TEMPERATURE: 97.8 F | OXYGEN SATURATION: 99 % | DIASTOLIC BLOOD PRESSURE: 78 MMHG | SYSTOLIC BLOOD PRESSURE: 114 MMHG

## 2019-06-21 DIAGNOSIS — M25.552 PAIN OF BOTH HIP JOINTS: ICD-10-CM

## 2019-06-21 DIAGNOSIS — F41.9 ANXIETY: Primary | ICD-10-CM

## 2019-06-21 DIAGNOSIS — I49.3 PVC (PREMATURE VENTRICULAR CONTRACTION): ICD-10-CM

## 2019-06-21 DIAGNOSIS — M25.551 PAIN OF BOTH HIP JOINTS: ICD-10-CM

## 2019-06-21 DIAGNOSIS — M25.561 PAIN IN BOTH KNEES, UNSPECIFIED CHRONICITY: Primary | ICD-10-CM

## 2019-06-21 DIAGNOSIS — M25.562 PAIN IN BOTH KNEES, UNSPECIFIED CHRONICITY: Primary | ICD-10-CM

## 2019-06-21 PROCEDURE — 97110 THERAPEUTIC EXERCISES: CPT

## 2019-06-21 PROCEDURE — 99214 OFFICE O/P EST MOD 30 MIN: CPT | Performed by: NURSE PRACTITIONER

## 2019-06-21 PROCEDURE — 97112 NEUROMUSCULAR REEDUCATION: CPT

## 2019-06-21 PROCEDURE — 3008F BODY MASS INDEX DOCD: CPT | Performed by: NURSE PRACTITIONER

## 2019-06-21 RX ORDER — SERTRALINE HYDROCHLORIDE 25 MG/1
25 TABLET, FILM COATED ORAL DAILY
Qty: 30 TABLET | Refills: 5 | Status: SHIPPED | OUTPATIENT
Start: 2019-06-21 | End: 2019-12-31

## 2019-06-27 PROBLEM — I49.3 PVC (PREMATURE VENTRICULAR CONTRACTION): Status: ACTIVE | Noted: 2019-06-27

## 2019-07-08 ENCOUNTER — OFFICE VISIT (OUTPATIENT)
Dept: PHYSICAL THERAPY | Facility: REHABILITATION | Age: 39
End: 2019-07-08
Payer: COMMERCIAL

## 2019-07-08 DIAGNOSIS — M25.552 PAIN OF BOTH HIP JOINTS: ICD-10-CM

## 2019-07-08 DIAGNOSIS — M25.561 PAIN IN BOTH KNEES, UNSPECIFIED CHRONICITY: Primary | ICD-10-CM

## 2019-07-08 DIAGNOSIS — M25.562 PAIN IN BOTH KNEES, UNSPECIFIED CHRONICITY: Primary | ICD-10-CM

## 2019-07-08 DIAGNOSIS — M25.551 PAIN OF BOTH HIP JOINTS: ICD-10-CM

## 2019-07-08 PROCEDURE — 97530 THERAPEUTIC ACTIVITIES: CPT

## 2019-07-08 PROCEDURE — 97110 THERAPEUTIC EXERCISES: CPT

## 2019-07-08 PROCEDURE — 97112 NEUROMUSCULAR REEDUCATION: CPT

## 2019-07-08 NOTE — PROGRESS NOTES
Daily Note     Today's date: 2019  Patient name: Luiz Mccormick  : 1980  MRN: [id number]  Referring provider: Joan Das DO  Dx:   Encounter Diagnosis     ICD-10-CM    1  Pain in both knees, unspecified chronicity M25 561     M25 562    2  Pain of both hip joints M25 551     M25 552                   Subjective: Patient states that she ran her fastest 5k without c/o pain or issues  Objective: See treatment diary below  Precautions: None     Daily Treatment Diary      Manual   5/22  6/10                   Kristian test stretch  HS                     Prone quad stretch  HS                     Pelvic compression taping   HS                      Add stretching    HS                                                 Exercise Diary   5/22  5/22  5/24  5/31  6/4  6/10  6/14  6/21  7/8     Upright L/C  10'  10'  10'  10'   10'  10'  10'  5'  10'     Side stepping with t-band (toe in)  OTB 4 laps    OTB 4 laps  OTB 4 laps  OTB 4 laps  OTB 4 laps   GTB 4 laps  GTB 4 laps     Prone hip ER MREs  2x10 ea                      seated ER  5" 2x10        5" 2x10              kegel w/ TA   5" 2x10  5" 2x10                  kegel/TA   Add iso   5" 2x10  5" 2x10                  kegel/TA march   2x10 2x10  2x10  2x10  3x10            clamshells w/ kegel   5" 2x10 5" 2x10  5" 2x10  5" 2x10        3x10      prone heel squeezes    5" 2x10  5" 2x10    5" 2x10  5" 3x10            functional squatting     2x10  2x10  2x10      3x10  3x10  3x10      kegel/TA BKFO      2x10    2x10  3x10  3x10  3x10        leg press        115# 3x10  120# 3x10  175# 3x10  175# 3x10  235# 3X10  235# 3x10      prone hip ext w/ knee flex       2x10  2x10  2x10            Obturator internus stretch           x10  x10         storks       OTB 2x10 ea otb 2x10 GTB 2x10          Modalities                                                                                                               Assessment: Pt will be D/C to [facility]    Plan: D/C PT

## 2019-07-29 ENCOUNTER — OFFICE VISIT (OUTPATIENT)
Dept: INTERNAL MEDICINE CLINIC | Facility: CLINIC | Age: 39
End: 2019-07-29
Payer: COMMERCIAL

## 2019-07-29 VITALS
TEMPERATURE: 98.2 F | SYSTOLIC BLOOD PRESSURE: 110 MMHG | WEIGHT: 179 LBS | RESPIRATION RATE: 14 BRPM | HEIGHT: 69 IN | DIASTOLIC BLOOD PRESSURE: 66 MMHG | HEART RATE: 62 BPM | OXYGEN SATURATION: 99 % | BODY MASS INDEX: 26.51 KG/M2

## 2019-07-29 DIAGNOSIS — F41.9 ANXIETY: Primary | ICD-10-CM

## 2019-07-29 PROCEDURE — 3008F BODY MASS INDEX DOCD: CPT | Performed by: NURSE PRACTITIONER

## 2019-07-29 PROCEDURE — 1036F TOBACCO NON-USER: CPT | Performed by: NURSE PRACTITIONER

## 2019-07-29 PROCEDURE — 99214 OFFICE O/P EST MOD 30 MIN: CPT | Performed by: NURSE PRACTITIONER

## 2019-07-29 NOTE — PROGRESS NOTES
Assessment/Plan:    Anxiety  Continue zoloft  Patient did not want to increase it today  rto in three months       Diagnoses and all orders for this visit:    Anxiety          Subjective:      Patient ID: Horace Carlos is a 42 y o  female  Patient is here to follow up on anxiety  Doing much better on zoloft 25mg  Sometimes she forgets to take it    Denies depression or suicidal thoughts        The following portions of the patient's history were reviewed and updated as appropriate: allergies, current medications, past family history, past medical history, past social history, past surgical history and problem list     Review of Systems   Constitutional: Negative  HENT: Negative  Eyes: Negative  Respiratory: Negative  Cardiovascular: Negative  Gastrointestinal: Negative  Musculoskeletal: Negative  Neurological: Negative  Objective:      /66 (BP Location: Left arm, Patient Position: Sitting, Cuff Size: Large)   Pulse 62   Temp 98 2 °F (36 8 °C) (Oral)   Resp 14   Ht 5' 8 5" (1 74 m)   Wt 81 2 kg (179 lb)   SpO2 99%   BMI 26 82 kg/m²          Physical Exam   Constitutional: She is oriented to person, place, and time  She appears well-developed and well-nourished  HENT:   Head: Normocephalic and atraumatic  Right Ear: External ear normal    Left Ear: External ear normal    Nose: Nose normal    Mouth/Throat: Oropharynx is clear and moist    Eyes: Pupils are equal, round, and reactive to light  Conjunctivae are normal    Neck: Normal range of motion  Neck supple  Cardiovascular: Normal rate and regular rhythm  Pulmonary/Chest: Effort normal and breath sounds normal    Abdominal: Soft  Bowel sounds are normal    Musculoskeletal: Normal range of motion  Neurological: She is alert and oriented to person, place, and time  Skin: Skin is warm and dry  Nursing note and vitals reviewed

## 2019-10-06 ENCOUNTER — APPOINTMENT (OUTPATIENT)
Dept: LAB | Age: 39
End: 2019-10-06
Payer: COMMERCIAL

## 2019-10-06 ENCOUNTER — OFFICE VISIT (OUTPATIENT)
Dept: URGENT CARE | Age: 39
End: 2019-10-06
Payer: COMMERCIAL

## 2019-10-06 ENCOUNTER — APPOINTMENT (OUTPATIENT)
Dept: RADIOLOGY | Age: 39
End: 2019-10-06
Attending: PHYSICIAN ASSISTANT
Payer: COMMERCIAL

## 2019-10-06 VITALS
BODY MASS INDEX: 27.7 KG/M2 | TEMPERATURE: 97.7 F | RESPIRATION RATE: 16 BRPM | SYSTOLIC BLOOD PRESSURE: 110 MMHG | WEIGHT: 187 LBS | OXYGEN SATURATION: 98 % | HEART RATE: 53 BPM | DIASTOLIC BLOOD PRESSURE: 64 MMHG | HEIGHT: 69 IN

## 2019-10-06 DIAGNOSIS — M77.52 TENDINITIS OF LEFT FOOT: Primary | ICD-10-CM

## 2019-10-06 DIAGNOSIS — M79.672 LEFT FOOT PAIN: ICD-10-CM

## 2019-10-06 DIAGNOSIS — C18.1 CANCER OF APPENDIX (HCC): ICD-10-CM

## 2019-10-06 LAB
BUN SERPL-MCNC: 13 MG/DL (ref 5–25)
CEA SERPL-MCNC: <0.5 NG/ML (ref 0–3)
CREAT SERPL-MCNC: 0.7 MG/DL (ref 0.6–1.3)
GFR SERPL CREATININE-BSD FRML MDRD: 111 ML/MIN/1.73SQ M

## 2019-10-06 PROCEDURE — 36415 COLL VENOUS BLD VENIPUNCTURE: CPT

## 2019-10-06 PROCEDURE — G0382 LEV 3 HOSP TYPE B ED VISIT: HCPCS | Performed by: PHYSICIAN ASSISTANT

## 2019-10-06 PROCEDURE — 84520 ASSAY OF UREA NITROGEN: CPT

## 2019-10-06 PROCEDURE — 82565 ASSAY OF CREATININE: CPT

## 2019-10-06 PROCEDURE — 82378 CARCINOEMBRYONIC ANTIGEN: CPT

## 2019-10-06 PROCEDURE — 73630 X-RAY EXAM OF FOOT: CPT

## 2019-10-06 PROCEDURE — S9083 URGENT CARE CENTER GLOBAL: HCPCS | Performed by: PHYSICIAN ASSISTANT

## 2019-10-06 NOTE — PROGRESS NOTES
[facility]        NAME: James Ryder is a 42 y o  female  : 1980    MRN: [id number]  DATE: 2019  TIME: 3:42 PM    Assessment and Plan   Tendinitis of left foot [M77 52]  1  Tendinitis of left foot  XR foot 3+ vw left         Patient Instructions       Follow up with PCP in 3-5 days  Proceed to  ER if symptoms worsen  Chief Complaint     Chief Complaint   Patient presents with    Foot Pain     C/O left foot pain  Yesterday she ran 11 miles  When she got home and took the shoe off, the pain increased  Today had trouble walking on it  Pain with weight bearing  is 7-8  pain is lateral side of left foot  History of Present Illness       Patient is here for evaluation of pain in her left lateral foot  Patient states yesterday she ran and 6 mi run and door jam started feel pain in her left lateral foot  She denies any specific injury or trauma to the area  Review of Systems   Review of Systems   Constitutional: Negative  Musculoskeletal: Negative for arthralgias and gait problem  Skin: Negative for wound  Neurological: Negative  Current Medications       Current Outpatient Medications:     Cholecalciferol (VITAMIN D) 2000 units CAPS, Take 3,000 Units by mouth  , Disp: , Rfl:     Polyethylene Glycol 3350 (MIRALAX PO), Take 1 packet by mouth daily as needed  , Disp: , Rfl:     Prenatal Multivit-Min-Fe-FA (PRENATAL VITAMINS) 0 8 MG tablet, Take 1 tablet by mouth daily, Disp: , Rfl:     sertraline (ZOLOFT) 25 mg tablet, Take 1 tablet (25 mg total) by mouth daily, Disp: 30 tablet, Rfl: 5    Current Allergies     Allergies as of 10/06/2019 - Reviewed 10/06/2019   Allergen Reaction Noted    Seasonal ic [cholestatin] Allergic Rhinitis 2014    Amoxicillin Hives 02/15/2016    Augmentin [amoxicillin-pot clavulanate] Hives 02/15/2016    Fluoxetine Other (See Comments) 2014    Penicillins Rash 2014    Wound dressings Rash 2014 The following portions of the patient's history were reviewed and updated as appropriate: allergies, current medications, past family history, past medical history, past social history, past surgical history and problem list      Past Medical History:   Diagnosis Date    Anxiety     last assessed - [id number]    Hyperlipidemia     last assessed - [id number]    IBS (irritable bowel syndrome)     Low grade mucinous neoplasm of appendix     last assessed - [id number]    Nausea     Normal delivery     2014 daughter   [facility] Palpitations     last assessed - [id number]    Right bundle branch block     last assessed - [id number]    Thyroid cyst     last assessed - [id number]    Vacuum extractor delivery, delivered     2012 daughter    Varicella     childhood    Vitamin D deficiency        Past Surgical History:   Procedure Laterality Date    APPENDECTOMY      COLPOSCOPY W/ BIOPSY / CURETTAGE      Colposcopy Cervix with Biopsy(s)    LACRIMAL DUCT PROBING      surgery of the Lacrimal system    TN COLONOSCOPY FLX DX W/COLLJ SPEC WHEN PFRMD N/A 2/15/2016    Procedure: EGD AND COLONOSCOPY;  Surgeon: Nish Romero DO;  Location: [facility];   Service: General    SUBTOTAL COLECTOMY      Partial colectomy - with resection of appendix    TEAR DUCT SURGERY      TOOTH EXTRACTION      last assessed - [id number]    TUMOR REMOVAL  12/2015    removed from appendix       Family History   Problem Relation Age of Onset    Heart murmur Mother         type unspecified     Cancer Mother         thyroid    Hyperlipidemia Father     Hypertension Father     Arthritis Maternal Grandmother     Coronary artery disease Maternal Grandmother     Transient ischemic attack Maternal Grandmother     Cancer Maternal Grandmother         thyroid    Hypertension Maternal Grandmother     Stroke Maternal Grandmother     Depression Paternal Grandmother     Cancer Maternal Aunt         lung    Lake syndrome Paternal Aunt     Cancer Paternal Aunt         lymphoma    Depression Sister     Cancer Paternal Grandfather         bowel    Heart disease Paternal Grandfather     Hyperlipidemia Paternal Grandfather     Hypertension Paternal Grandfather     Heart defect Maternal Aunt          Medications have been verified  Objective   /64 (BP Location: Left arm, Patient Position: Sitting, Cuff Size: Large)   Pulse (!) 53   Temp 97 7 °F (36 5 °C) (Temporal)   Resp 16   Ht 5' 8 5" (1 74 m)   Wt 84 8 kg (187 lb)   LMP 10/06/2019   SpO2 98%   BMI 28 02 kg/m²        Physical Exam     Physical Exam   Constitutional: She is oriented to person, place, and time  She appears well-developed and well-nourished  No distress  HENT:   Head: Normocephalic and atraumatic  Musculoskeletal:   Full range of motion of the left foot ankle  Patient does have pain with inversion and eversion  There is tenderness over the lateral aspect of the foot over the tendons  No bony point tenderness  No ecchymosis  No soft tissue swelling  No crepitation  Strength 5/5  Neurological: She is alert and oriented to person, place, and time  Skin: Skin is warm and dry  No rash noted  She is not diaphoretic  Psychiatric: She has a normal mood and affect  Her behavior is normal  Judgment and thought content normal    Nursing note and vitals reviewed  X-ray shows no acute findings

## 2019-10-06 NOTE — PATIENT INSTRUCTIONS
Rest injured body part  Ice 10-15 minutes off and on every 3-4 hours while awake for 48 hours after injury  After 48 hours you may start using warm compresses if appropriate  Compression use Ace wrap for support as needed  DO NOT wear to bed  Elevate injured body part as able to help decrease pain and swelling  Follow-up with orthopedics for further evaluation      Advance activities as tolerated

## 2019-10-11 ENCOUNTER — HOSPITAL ENCOUNTER (OUTPATIENT)
Dept: CT IMAGING | Facility: HOSPITAL | Age: 39
Discharge: HOME | End: 2019-10-11
Attending: SURGERY
Payer: COMMERCIAL

## 2019-10-11 DIAGNOSIS — C18.1 CANCER OF APPENDIX (HCC): ICD-10-CM

## 2019-10-11 PROCEDURE — 74177 CT ABD & PELVIS W/CONTRAST: CPT

## 2019-10-11 PROCEDURE — 71260 CT THORAX DX C+: CPT

## 2019-10-11 RX ADMIN — IOHEXOL 100 ML: 350 INJECTION, SOLUTION INTRAVENOUS at 10:22

## 2019-11-05 ENCOUNTER — OFFICE VISIT (OUTPATIENT)
Dept: SURGICAL ONCOLOGY | Facility: CLINIC | Age: 39
End: 2019-11-05
Payer: COMMERCIAL

## 2019-11-05 VITALS
BODY MASS INDEX: 28.34 KG/M2 | DIASTOLIC BLOOD PRESSURE: 76 MMHG | HEART RATE: 65 BPM | WEIGHT: 187 LBS | RESPIRATION RATE: 16 BRPM | TEMPERATURE: 97.6 F | SYSTOLIC BLOOD PRESSURE: 122 MMHG | HEIGHT: 68 IN

## 2019-11-05 DIAGNOSIS — D37.3 LOW GRADE MUCINOUS NEOPLASM OF APPENDIX: Primary | ICD-10-CM

## 2019-11-05 PROCEDURE — 99214 OFFICE O/P EST MOD 30 MIN: CPT | Performed by: SURGERY

## 2019-11-05 NOTE — LETTER
November 5, 2019     Annita Noel  [address]  [address] [address]    Patient: Katherine Alvarez   YOB: 1980   Date of Visit: 11/5/2019       Dear Dr Casanova Phlegm: Thank you for referring Jackie Ramirez to me for evaluation  Below are my notes for this consultation  If you have questions, please do not hesitate to call me  I look forward to following your patient along with you  Sincerely,        Mazin Mcintosh MD        CC: No Recipients  Mazin Mcintosh MD  11/5/2019  4:06 PM  Sign at close encounter               Surgical Oncology Follow Up       [address] [facility]  [address] [facility] [address] Leonarda Angelucci  1980  [id number]  [address] [place], [place] [address]Th [facility]  [facility] SURGICAL ONCOL[facility]Y [address] [address]    Diagnoses and all orders for this visit:    Low grade mucinous neoplasm of appendix  -     CT chest abdomen pelvis w contrast; Future  -     BUN; Future  -     CEA; Future  -     Creatinine, serum; Future        Chief Complaint   Patient presents with    Follow-up     1yr follow up       Return in about 1 year (around 11/5/2020) for Office Visit, Imaging - See orders, Labs - See Treatment Plan, with Yoko Mukherjee  No history exists  Diagnosis and Staging: Low-grade mucinous appendiceal neoplasm  No high-grade dysplasia  Margins negative   Treatment History: Laparoscopic appendectomy December 2015   Current Therapy: Observation   Disease Status: MEGA     History of Present Illness:  Patient returns in follow-up of her low-grade mucinous appendiceal neoplasm  She is doing well at this time with no complaints  No abdominal pain, nausea or vomiting  She has gained 10 lb since going back to work  CT from October 11, 2019 reveals no evidence of recurrence  Her lung nodules are stable  I personally reviewed the films    Her CEA level is normal     Review of Systems  Complete ROS Surg Onc:   Complete ROS Surg Onc:   Constitutional: The patient denies new or recent history of general fatigue, no recent weight loss, no change in appetite  Eyes: No complaints of visual problems, no scleral icterus  ENT: no complaints of ear pain, no hoarseness, no difficulty swallowing,  no tinnitus and no new masses in head, oral cavity, or neck  Cardiovascular: No complaints of chest pain, no palpitations, no ankle edema  Respiratory: No complaints of shortness of breath, no cough  Gastrointestinal: No complaints of jaundice, no bloody stools, no pale stools  Genitourinary: No complaints of dysuria, no hematuria, no nocturia, no frequent urination, no urethral discharge  Musculoskeletal: No complaints of weakness, paralysis, joint stiffness or arthralgias  Integumentary: No complaints of rash, no new lesions  Neurological: No complaints of convulsions, no seizures, no dizziness  Hematologic/Lymphatic: No complaints of easy bruising  Endocrine:  No hot or cold intolerance  No polydipsia, polyphagia, or polyuria  Allergy/immunology:  No environmental allergies  No food allergies  Not immunocompromised  Skin:  No pallor or rash  No wound          Patient Active Problem List   Diagnosis    Anxiety    Benign colon polyp    Chronic constipation    GERD without esophagitis    Hyperlipidemia    IBS (irritable bowel syndrome)    Vitamin D deficiency    History of loop electrical excision procedure (LEEP)    H/O right bundle branch block    Pain of both hip joints    Class 1 obesity due to excess calories without serious comorbidity with body mass index (BMI) of 30 0 to 30 9 in adult    Pain in both knees    Atypical chest pain    Palpitations    PVC (premature ventricular contraction)    Tendinitis of left foot     Past Medical History:   Diagnosis Date    Anxiety     last assessed - [id number]    Hyperlipidemia     last assessed - [id number]    IBS (irritable bowel syndrome)     Low grade mucinous neoplasm of appendix     last assessed - [id number]    Nausea     Normal delivery     2014 daughter   Randy Addison Palpitations     last assessed - [id number]    Right bundle branch block     last assessed - [id number]    Thyroid cyst     last assessed - [id number]    Vacuum extractor delivery, delivered     2012 daughter    Varicella     childhood    Vitamin D deficiency      Past Surgical History:   Procedure Laterality Date    APPENDECTOMY      COLPOSCOPY W/ BIOPSY / CURETTAGE      Colposcopy Cervix with Biopsy(s)    LACRIMAL DUCT PROBING      surgery of the Lacrimal system    UT COLONOSCOPY FLX DX W/COLLJ SPEC WHEN PFRMD N/A 2/15/2016    Procedure: EGD AND COLONOSCOPY;  Surgeon: Petra Kunz DO;  [facility]: [facility];   Service: General    SUBTOTAL COLECTOMY      Partial colectomy - with resection of appendix    TEAR DUCT SURGERY      TOOTH EXTRACTION      last assessed - [id number]    TUMOR REMOVAL  12/2015    removed from appendix     Family History   Problem Relation Age of Onset    Heart murmur Mother         type unspecified     Cancer Mother         thyroid    Hyperlipidemia Father     Hypertension Father     Arthritis Maternal Grandmother     Coronary artery disease Maternal Grandmother     Transient ischemic attack Maternal Grandmother     Cancer Maternal Grandmother         thyroid    Hypertension Maternal Grandmother     Stroke Maternal Grandmother     Depression Paternal Grandmother     Cancer Maternal Aunt         lung    Lake syndrome Paternal Aunt     Cancer Paternal Aunt         lymphoma    Depression Sister     Cancer Paternal Grandfather         bowel    Heart disease Paternal Grandfather     Hyperlipidemia Paternal Grandfather     Hypertension Paternal Grandfather     Heart defect Maternal Aunt      Social History     Socioeconomic History    Marital status: /Civil Union     Spouse name: Not on file    Number of children: Not on file    Years of education: Not on file    Highest education level: Not on file   Occupational History    Not on file   Social Needs    Financial resource strain: Not on file    Food insecurity:     Worry: Not on file     Inability: Not on file    Transportation needs:     Medical: Not on file     Non-medical: Not on file   Tobacco Use    Smoking status: Never Smoker    Smokeless tobacco: Never Used   Substance and Sexual Activity    Alcohol use: Not Currently     Comment: rarely; drinks beer; social alcohol use    Drug use: No    Sexual activity: Yes     Partners: Male     Birth control/protection: Male Sterilization   Lifestyle    Physical activity:     Days per week: Not on file     Minutes per session: Not on file    Stress: Not on file   Relationships    Social connections:     Talks on phone: Not on file     Gets together: Not on file     Attends Zoroastrian service: Not on file     Active member of club or organization: Not on file     Attends meetings of clubs or organizations: Not on file     Relationship status: Not on file    Intimate partner violence:     Fear of current or ex partner: Not on file     Emotionally abused: Not on file     Physically abused: Not on file     Forced sexual activity: Not on file   Other Topics Concern    Not on file   Social History Narrative    Activities: Soccer    Always uses seat belt    Drinks coffee    Exercise: Running    Exercises moderately less than 3 times a week           Current Outpatient Medications:     Cholecalciferol (VITAMIN D) 2000 units CAPS, Take 3,000 Units by mouth  , Disp: , Rfl:     Polyethylene Glycol 3350 (MIRALAX PO), Take 1 packet by mouth daily as needed  , Disp: , Rfl:     Prenatal Multivit-Min-Fe-FA (PRENATAL VITAMINS) 0 8 MG tablet, Take 1 tablet by mouth daily, Disp: , Rfl:     sertraline (ZOLOFT) 25 mg tablet, Take 1 tablet (25 mg total) by mouth daily, Disp: 30 tablet, Rfl: 5  Allergies   Allergen Reactions    Seasonal Ic [Cholestatin] Allergic Rhinitis    Amoxicillin Hives    Augmentin [Amoxicillin-Pot Clavulanate] Hives    Fluoxetine Other (See Comments)     Other reaction(s): bloating    Penicillins Rash    Wound Dressings Rash     Vitals:    11/05/19 1552   BP: 122/76   Pulse: 65   Resp: 16   Temp: 97 6 °F (36 4 °C)       Physical Exam  Constitutional: General appearance: The Patient is well-developed and well-nourished who appears the stated age in no acute distress  Patient is pleasant and talkative  HEENT:  Normocephalic  Sclerae are anicteric  Mucous membranes are moist  Neck is supple without adenopathy  No JVD  Chest: The lungs are clear to auscultation  Cardiac: Heart is regular rate  Abdomen: Abdomen is soft, non-tender, non-distended and without masses  Extremities: There is no clubbing or cyanosis  There is no edema  Symmetric  Neuro: Grossly nonfocal  Gait is normal      Lymphatic: No evidence of cervical adenopathy bilaterally  No evidence of axillary adenopathy bilaterally  No evidence of inguinal adenopathy bilaterally  Skin: Warm, anicteric  Psych:  Patient is pleasant and talkative  Breasts:        Pathology:  [unfilled]    Labs:  Lab Results   Component Value Date    CEA <0 5 10/06/2019         Imaging  Ct Chest Abdomen Pelvis W Contrast    Result Date: 10/15/2019  Narrative: CT CHEST, ABDOMEN AND PELVIS WITH IV CONTRAST INDICATION:   C18 1: Malignant neoplasm of appendix  COMPARISON:  None  TECHNIQUE: CT examination of the chest, abdomen and pelvis was performed  Axial, sagittal, and coronal 2D reformatted images were created from the source data and submitted for interpretation  Radiation dose length product (DLP) for this visit:  716 mGy-cm     This examination, like all CT scans performed in the [facility], was performed utilizing techniques to minimize radiation dose exposure, including the use of iterative reconstruction and automated exposure control  IV Contrast:  100 mL of iohexol (OMNIPAQUE) Enteric Contrast: Enteric contrast was administered  FINDINGS: CHEST LUNGS: Pulmonary nodules as follows; image numbers are in reference to series 4: 3 mm right lower lobe nodule on image 100 is unchanged There are scattered small subpleural nodules, similar to prior PLEURA:  Unremarkable  HEART/GREAT VESSELS:  Unremarkable for patient's age  MEDIASTINUM AND ROMEO:  Remnant/reactive thymic tissue is noted anteriorly CHEST WALL AND LOWER NECK:   Unremarkable  ABDOMEN LIVER/BILIARY TREE:  Unremarkable  GALLBLADDER:  No calcified gallstones  No pericholecystic inflammatory change  SPLEEN:  Unremarkable  PANCREAS:  Unremarkable  ADRENAL GLANDS:  Unremarkable  KIDNEYS/URETERS:  Unremarkable  No hydronephrosis  STOMACH AND BOWEL:  Unremarkable  APPENDIX:  Post appendectomy ABDOMINOPELVIC CAVITY:  No ascites or free intraperitoneal air  No lymphadenopathy  VESSELS:  Unremarkable for patient's age  PELVIS REPRODUCTIVE ORGANS:  Unremarkable for patient's age  URINARY BLADDER:  Unremarkable  ABDOMINAL WALL/INGUINAL REGIONS:  Unremarkable  OSSEOUS STRUCTURES:  No acute fracture or destructive osseous lesion  Impression: No evidence of metastasis or recurrence Workstation performed: [id number]     I reviewed the above laboratory and imaging data  Discussion/Summary: 42-year-old female status post laparoscopic appendectomy for low-grade mucinous appendiceal neoplasm  There is no evidence of recurrence by imaging, symptomatology, or physical exam  Her CEA level is normal  I did discuss that the lung nodules are stable  The patient is not a smoker  We will schedule her repeat CT and CEA level for 1 year  I will see her again in one year with repeat imaging and a CEA level  If she develops any chest symptoms in the interim we will obtain the CT sooner  Assuming her next imaging is normal, we will repeat her CT and CEA level every 2 years until we reached 10 years out  That time, we will determine further follow-up if needed  She is agreeable to this  All her questions were answered

## 2019-11-05 NOTE — PROGRESS NOTES
Surgical Oncology Follow Up       [address] [facility] SURGICAL ONCOLOGY [address]  [facility] [address]    Lillian Arzola  1980  [id number]  [address] [facility] SURGICAL ONCOLOGY [address] [address]    Diagnoses and all orders for this visit:    Low grade mucinous neoplasm of appendix  -     CT chest abdomen pelvis w contrast; Future  -     BUN; Future  -     CEA; Future  -     Creatinine, serum; Future        Chief Complaint   Patient presents with    Follow-up     1yr follow up       Return in about 1 year (around 11/5/2020) for Office Visit, Imaging - See orders, Labs - See Treatment Plan, with Tho Luna  No history exists  Diagnosis and Staging: Low-grade mucinous appendiceal neoplasm  No high-grade dysplasia  Margins negative   Treatment History: Laparoscopic appendectomy December 2015   Current Therapy: Observation   Disease Status: MEGA     History of Present Illness:  Patient returns in follow-up of her low-grade mucinous appendiceal neoplasm  She is doing well at this time with no complaints  No abdominal pain, nausea or vomiting  She has gained 10 lb since going back to work  CT from October 11, 2019 reveals no evidence of recurrence  Her lung nodules are stable  I personally reviewed the films  Her CEA level is normal     Review of Systems  Complete ROS Surg Onc:   Complete ROS Surg Onc:   Constitutional: The patient denies new or recent history of general fatigue, no recent weight loss, no change in appetite  Eyes: No complaints of visual problems, no scleral icterus  ENT: no complaints of ear pain, no hoarseness, no difficulty swallowing,  no tinnitus and no new masses in head, oral cavity, or neck  Cardiovascular: No complaints of chest pain, no palpitations, no ankle edema  Respiratory: No complaints of shortness of breath, no cough     Gastrointestinal: No complaints of jaundice, no bloody stools, no pale stools  Genitourinary: No complaints of dysuria, no hematuria, no nocturia, no frequent urination, no urethral discharge  Musculoskeletal: No complaints of weakness, paralysis, joint stiffness or arthralgias  Integumentary: No complaints of rash, no new lesions  Neurological: No complaints of convulsions, no seizures, no dizziness  Hematologic/Lymphatic: No complaints of easy bruising  Endocrine:  No hot or cold intolerance  No polydipsia, polyphagia, or polyuria  Allergy/immunology:  No environmental allergies  No food allergies  Not immunocompromised  Skin:  No pallor or rash  No wound          Patient Active Problem List   Diagnosis    Anxiety    Benign colon polyp    Chronic constipation    GERD without esophagitis    Hyperlipidemia    IBS (irritable bowel syndrome)    Vitamin D deficiency    History of loop electrical excision procedure (LEEP)    H/O right bundle branch block    Pain of both hip joints    Class 1 obesity due to excess calories without serious comorbidity with body mass index (BMI) of 30 0 to 30 9 in adult    Pain in both knees    Atypical chest pain    Palpitations    PVC (premature ventricular contraction)    Tendinitis of left foot     Past Medical History:   Diagnosis Date    Anxiety     last assessed - [id number]    Hyperlipidemia     last assessed - [id number]    IBS (irritable bowel syndrome)     Low grade mucinous neoplasm of appendix     last assessed - [id number]    Nausea     Normal delivery     2014 daughter   [facility] Palpitations     last assessed - [id number]    Right bundle branch block     last assessed - [id number]    Thyroid cyst     last assessed - [id number]    Vacuum extractor delivery, delivered     2012 daughter    Varicella     childhood    Vitamin D deficiency      Past Surgical History:   Procedure Laterality Date    APPENDECTOMY      COLPOSCOPY W/ BIOPSY / CURETTAGE      Colposcopy Cervix with Biopsy(s)    LACRIMAL DUCT PROBING      surgery of the Lacrimal system    NV COLONOSCOPY FLX DX W/COLLJ SPEC WHEN PFRMD N/A 2/15/2016    Procedure: EGD AND COLONOSCOPY;  Surgeon: Linda Lo DO;  Location: [facility];   Service: General    SUBTOTAL COLECTOMY      Partial colectomy - with resection of appendix    TEAR DUCT SURGERY      TOOTH EXTRACTION      last assessed - [id number]    TUMOR REMOVAL  12/2015    removed from appendix     Family History   Problem Relation Age of Onset    Heart murmur Mother         type unspecified     Cancer Mother         thyroid    Hyperlipidemia Father     Hypertension Father     Arthritis Maternal Grandmother     Coronary artery disease Maternal Grandmother     Transient ischemic attack Maternal Grandmother     Cancer Maternal Grandmother         thyroid    Hypertension Maternal Grandmother     Stroke Maternal Grandmother     Depression Paternal Grandmother     Cancer Maternal Aunt         lung    Lake syndrome Paternal Aunt     Cancer Paternal Aunt         lymphoma    Depression Sister     Cancer Paternal Grandfather         bowel    Heart disease Paternal Grandfather     Hyperlipidemia Paternal Grandfather     Hypertension Paternal Grandfather     Heart defect Maternal Aunt      Social History     Socioeconomic History    Marital status: /Civil Union     Spouse name: Not on file    Number of children: Not on file    Years of education: Not on file    Highest education level: Not on file   Occupational History    Not on file   Social Needs    Financial resource strain: Not on file    Food insecurity:     Worry: Not on file     Inability: Not on file    Transportation needs:     Medical: Not on file     Non-medical: Not on file   Tobacco Use    Smoking status: Never Smoker    Smokeless tobacco: Never Used   Substance and Sexual Activity    Alcohol use: Not Currently     Comment: rarely; drinks beer; social alcohol use    Drug use: No    Sexual activity: Yes     Partners: Male     Birth control/protection: Male Sterilization   Lifestyle    Physical activity:     Days per week: Not on file     Minutes per session: Not on file    Stress: Not on file   Relationships    Social connections:     Talks on phone: Not on file     Gets together: Not on file     Attends Religion service: Not on file     Active member of club or organization: Not on file     Attends meetings of clubs or organizations: Not on file     Relationship status: Not on file    Intimate partner violence:     Fear of current or ex partner: Not on file     Emotionally abused: Not on file     Physically abused: Not on file     Forced sexual activity: Not on file   Other Topics Concern    Not on file   Social History Narrative    Activities: Soccer    Always uses seat belt    Drinks coffee    Exercise: Running    Exercises moderately less than 3 times a week           Current Outpatient Medications:     Cholecalciferol (VITAMIN D) 2000 units CAPS, Take 3,000 Units by mouth  , Disp: , Rfl:     Polyethylene Glycol 3350 (MIRALAX PO), Take 1 packet by mouth daily as needed  , Disp: , Rfl:     Prenatal Multivit-Min-Fe-FA (PRENATAL VITAMINS) 0 8 MG tablet, Take 1 tablet by mouth daily, Disp: , Rfl:     sertraline (ZOLOFT) 25 mg tablet, Take 1 tablet (25 mg total) by mouth daily, Disp: 30 tablet, Rfl: 5  Allergies   Allergen Reactions    Seasonal Ic [Cholestatin] Allergic Rhinitis    Amoxicillin Hives    Augmentin [Amoxicillin-Pot Clavulanate] Hives    Fluoxetine Other (See Comments)     Other reaction(s): bloating    Penicillins Rash    Wound Dressings Rash     Vitals:    11/05/19 1552   BP: 122/76   Pulse: 65   Resp: 16   Temp: 97 6 °F (36 4 °C)       Physical Exam  Constitutional: General appearance: The Patient is well-developed and well-nourished who appears the stated age in no acute distress  Patient is pleasant and talkative  HEENT:  Normocephalic    Sclerae are anicteric  Mucous membranes are moist  Neck is supple without adenopathy  No JVD  Chest: The lungs are clear to auscultation  Cardiac: Heart is regular rate  Abdomen: Abdomen is soft, non-tender, non-distended and without masses  Extremities: There is no clubbing or cyanosis  There is no edema  Symmetric  Neuro: Grossly nonfocal  Gait is normal      Lymphatic: No evidence of cervical adenopathy bilaterally  No evidence of axillary adenopathy bilaterally  No evidence of inguinal adenopathy bilaterally  Skin: Warm, anicteric  Psych:  Patient is pleasant and talkative  Breasts:        Pathology:  [unfilled]    Labs:  Lab Results   Component Value Date    CEA <0 5 10/06/2019         Imaging  Ct Chest Abdomen Pelvis W Contrast    Result Date: 10/15/2019  Narrative: CT CHEST, ABDOMEN AND PELVIS WITH IV CONTRAST INDICATION:   C18 1: Malignant neoplasm of appendix  COMPARISON:  None  TECHNIQUE: CT examination of the chest, abdomen and pelvis was performed  Axial, sagittal, and coronal 2D reformatted images were created from the source data and submitted for interpretation  Radiation dose length product (DLP) for this visit:  716 mGy-cm   This examination, like all CT scans performed in the [facility], was performed utilizing techniques to minimize radiation dose exposure, including the use of iterative reconstruction and automated exposure control  IV Contrast:  100 mL of iohexol (OMNIPAQUE) Enteric Contrast: Enteric contrast was administered  FINDINGS: CHEST LUNGS: Pulmonary nodules as follows; image numbers are in reference to series 4: 3 mm right lower lobe nodule on image 100 is unchanged There are scattered small subpleural nodules, similar to prior PLEURA:  Unremarkable  HEART/GREAT VESSELS:  Unremarkable for patient's age  MEDIASTINUM AND ROMEO:  Remnant/reactive thymic tissue is noted anteriorly CHEST WALL AND LOWER NECK:   Unremarkable   ABDOMEN LIVER/BILIARY TREE: Unremarkable  GALLBLADDER:  No calcified gallstones  No pericholecystic inflammatory change  SPLEEN:  Unremarkable  PANCREAS:  Unremarkable  ADRENAL GLANDS:  Unremarkable  KIDNEYS/URETERS:  Unremarkable  No hydronephrosis  STOMACH AND BOWEL:  Unremarkable  APPENDIX:  Post appendectomy ABDOMINOPELVIC CAVITY:  No ascites or free intraperitoneal air  No lymphadenopathy  VESSELS:  Unremarkable for patient's age  PELVIS REPRODUCTIVE ORGANS:  Unremarkable for patient's age  URINARY BLADDER:  Unremarkable  ABDOMINAL WALL/INGUINAL REGIONS:  Unremarkable  OSSEOUS STRUCTURES:  No acute fracture or destructive osseous lesion  Impression: No evidence of metastasis or recurrence Workstation performed: [id number]     I reviewed the above laboratory and imaging data  Discussion/Summary: 82-year-old female status post laparoscopic appendectomy for low-grade mucinous appendiceal neoplasm  There is no evidence of recurrence by imaging, symptomatology, or physical exam  Her CEA level is normal  I did discuss that the lung nodules are stable  The patient is not a smoker  We will schedule her repeat CT and CEA level for 1 year  I will see her again in one year with repeat imaging and a CEA level  If she develops any chest symptoms in the interim we will obtain the CT sooner  Assuming her next imaging is normal, we will repeat her CT and CEA level every 2 years until we reached 10 years out  That time, we will determine further follow-up if needed  She is agreeable to this  All her questions were answered

## 2019-11-13 ENCOUNTER — OFFICE VISIT (OUTPATIENT)
Dept: INTERNAL MEDICINE CLINIC | Facility: CLINIC | Age: 39
End: 2019-11-13
Payer: COMMERCIAL

## 2019-11-13 VITALS
RESPIRATION RATE: 16 BRPM | HEART RATE: 53 BPM | HEIGHT: 68 IN | SYSTOLIC BLOOD PRESSURE: 112 MMHG | OXYGEN SATURATION: 98 % | BODY MASS INDEX: 28.64 KG/M2 | WEIGHT: 189 LBS | DIASTOLIC BLOOD PRESSURE: 68 MMHG

## 2019-11-13 DIAGNOSIS — I49.3 PVC (PREMATURE VENTRICULAR CONTRACTION): ICD-10-CM

## 2019-11-13 DIAGNOSIS — J01.00 ACUTE NON-RECURRENT MAXILLARY SINUSITIS: ICD-10-CM

## 2019-11-13 DIAGNOSIS — E55.9 VITAMIN D DEFICIENCY: ICD-10-CM

## 2019-11-13 DIAGNOSIS — Z01.419 ENCOUNTER FOR GYNECOLOGICAL EXAMINATION WITHOUT ABNORMAL FINDING: ICD-10-CM

## 2019-11-13 DIAGNOSIS — E78.5 HYPERLIPIDEMIA, UNSPECIFIED HYPERLIPIDEMIA TYPE: ICD-10-CM

## 2019-11-13 DIAGNOSIS — F41.9 ANXIETY: Primary | ICD-10-CM

## 2019-11-13 PROBLEM — J01.01 ACUTE RECURRENT MAXILLARY SINUSITIS: Status: ACTIVE | Noted: 2019-11-13

## 2019-11-13 PROCEDURE — 99214 OFFICE O/P EST MOD 30 MIN: CPT | Performed by: INTERNAL MEDICINE

## 2019-11-13 RX ORDER — AZITHROMYCIN 250 MG/1
TABLET, FILM COATED ORAL
Qty: 6 TABLET | Refills: 0 | Status: SHIPPED | OUTPATIENT
Start: 2019-11-13 | End: 2019-11-17

## 2019-11-13 NOTE — PROGRESS NOTES
Assessment/Plan:    Acute non-recurrent maxillary sinusitis  She reports me maxillary sinus pressure along with congestion/postnasal drip which is not improving over last 10 days will start Z-Marino 1  As directed, Flonase 2 sprays each nostril once a day plenty of fluids and rest if the symptoms not improved please notify me  I did advise the patient to check with pediatrics if okay to take the Z-Marino when nursing    PVC (premature ventricular contraction)  Currently stable triggered by anxiety recommend good hydration, stress reduction  Anxiety  Today I did  patient about getting adequate amounts of sleep, try her best to simplify her life, I have advised her start the Zoloft 25 mg once daily at this point time she does not want to change to a different medication  No SI    Encounter for gynecological examination without abnormal finding  I will have the patient see OBGYN for routine examination    Hyperlipidemia  Low-cholesterol diet will check a lipid profile 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    Vitamin D deficiency  Will check a vitamin-D 25 hydroxy level         Problem List Items Addressed This Visit        Respiratory    Acute non-recurrent maxillary sinusitis     She reports me maxillary sinus pressure along with congestion/postnasal drip which is not improving over last 10 days will start Z-Marino 1  As directed, Flonase 2 sprays each nostril once a day plenty of fluids and rest if the symptoms not improved please notify me  I did advise the patient to check with pediatrics if okay to take the Z-Marino when nursing         Relevant Medications    azithromycin (ZITHROMAX) 250 mg tablet       Cardiovascular and Mediastinum    PVC (premature ventricular contraction)     Currently stable triggered by anxiety recommend good hydration, stress reduction              Other    Anxiety - Primary     Today I did  patient about getting adequate amounts of sleep, try her best to simplify her life, I have advised her start the Zoloft 25 mg once daily at this point time she does not want to change to a different medication  No SI         Hyperlipidemia     Low-cholesterol diet will check a lipid profile 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         Relevant Orders    Comprehensive metabolic panel    Lipid Panel with Direct LDL reflex    Vitamin D deficiency     Will check a vitamin-D 25 hydroxy level         Relevant Orders    Vitamin D 25 hydroxy    Encounter for gynecological examination without abnormal finding     I will have the patient see OBGYN for routine examination         Relevant Orders    Ambulatory referral to Gynecology            Subjective:      Patient ID: Lillian Arzola is a 42 y o  female  HPI 47-year old female coming in for a follow up office visit regarding anxiety, vitamin-D deficiency, hyperlipidemia, sinusitis, PVCs; The patient reports me compliant taking medications without untoward side effects the  The patient is here to review his medical condition, update me on the medical condition and the patient reports me no hospitalizations and no ER visits  She reports me intermittent PVCs tender stable triggered by stress    She reports feeling overwhelmed at times, stressed she has not been taking her Zoloft on a daily basis at times misses the medication; 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, able to concentrate and focus, she is not taking zoloft daily ; first 8 weeks awful,  Administration ,  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[employer] , the pt reports b/l ear pain , sinus pressure , using flonase , no f/c  Not pregnant    The following portions of the patient's history were reviewed and updated as appropriate: allergies, current medications, past family history, past medical history, past social history, past surgical history and problem list     Review of Systems   Constitutional: Negative for activity change, appetite change and unexpected weight change  HENT: Positive for congestion, postnasal drip, sinus pressure and sinus pain  Eyes: Negative for visual disturbance  Respiratory: Negative for cough and shortness of breath  Cardiovascular: Negative for chest pain  Gastrointestinal: Negative for abdominal pain, diarrhea, nausea and vomiting  Neurological: Negative for light-headedness and headaches  Hematological: Negative for adenopathy  Psychiatric/Behavioral: Negative for suicidal ideas  The patient is nervous/anxious  Objective:    No follow-ups on file  No results found  Recent Results (from the past 28890 hour(s))   Holter monitor - 48 hour    Narrative    Forty-eight hour Holter monitor report    INDICATIONS:  Palpitations    DESCRIPTION OF FINDINGS:  The patient was monitored for a total of 48 hours  The patient was   predominantly in sinus rhythm throughout the study  The average heart   rate was 63 beats per minute  The heart rate ranged from a low of 37   beats per minute  to a maximum of 135 beats per minute    Ventricular ectopic activity consisted of 22 echo beats     Supraventricular ectopic activity consisted of 580 beats (0 3 % of total   beats),       There were no significant pauses  The longest R-R interval was 1 9   seconds  There was no evidence of advanced degree heart block  The accompanying patient diary notes symptoms -does not provide symptom   rhythm correlation      Impression    1  Predominantly sinus rhythm, with an average heart rate of 63 beats per   minute  2  580 premature atrial contractions  3  22 premature ventricular contractions  4   No significant pauses or advanced degree heart block        Attending Physician: Jada Atkinson MD     Stress test only, exercise    Narrative    [facility] [address] [place] [address]  [phone]    Stress Electrocardiography during Exercise    Name: Jarred Monroe  MR #: [id number]  Account #: [id number]  Study date: 2019  : 1980  Age: 42 years  Gender: Female  Height: 68 in  Weight: 179 1 lb  BSA: 1 95 mï¾²    Allergies: CHOLESTATIN, AMOXICILLIN, AMOXICILLIN-POT CLAVULANATE, FLUOXETINE, PENICILLINS, WOUND DRESSINGS    Diagnosis: R00 2 - Palpitations    RN:  Eli Maria RN  Primary Physician:  Radha Duran DO  Referring Physician:  Radha Duran DO  Interpreting Physician:  Bekah Kennedy MD  Group:  [facility]  Cardiology Fellow:  Leslie Yo DO  Report Prepared By[de-identified]  Eli Maria RN  Technician:  Levon Remy    CLINICAL QUESTION: Detection of coronary artery disease  HISTORY: The patient is a 42year old  female  Chest pain status: no chest pain  Other symptoms: palpitations  Coronary artery disease risk factors: dyslipidemia and family history of premature coronary artery disease  Cardiovascular history: arrhythmia, RBBB  Medications: a lipid lowering agent  PHYSICAL EXAM: Baseline physical exam screening: no wheezes audible  REST ECG: Normal sinus rhythm  The ECG showed right bundle branch block  PROCEDURE: Treadmill exercise testing was performed, using the Connor protocol  Stress electrocardiographic evaluation was performed  CONNOR PROTOCOL:  HR bpm SBP mmHg DBP mmHg Symptoms  Baseline 82 118 80 none  Stage 1 107 120 78 none  Stage 2 122 140 88 none  Stage 3 150 158 90 mild dyspnea  Stage 4 169 -- -- mild dyspnea, mild fatigue  Stage 5 176 -- -- mild dyspnea, moderate fatigue  Immediate 176 166 82 same as above  Recovery 1 97 120 80 subsiding  Recovery 2 98 118 70 none  No medications or fluids given  Pretest 97% and peak POX98%  STRESS SUMMARY: Duration of exercise was 13 min and 0 sec  The patient exercised to protocol stage 5  Maximal work rate was 17 2 METs  Functional capacity was normal  Maximal heart rate during stress was 176 bpm ( 96 % of maximal predicted  heart rate)   The heart rate response to stress was normal  There was normal resting blood pressure with an appropriate response to stress  The rate-pressure product for the peak heart rate and blood pressure was 82637  There was no chest  pain during stress  The stress test was terminated due to moderate fatigue  The stress ECG was negative for ischemia  There were no stress arrhythmias or conduction abnormalities  SUMMARY:  -  Stress results: Duration of exercise was 13 min and 0 sec  Maximal work rate was 17 2 METs  Functional capacity was normal  Target heart rate was achieved  There was no chest pain during stress  -  ECG conclusions: The stress ECG was negative for ischemia  IMPRESSIONS: Normal study after maximal exercise without reproduction of symptoms      Prepared and signed by    Eleni Ramirez MD  Signed 06/17/2019 11:15:33         Allergies   Allergen Reactions    Seasonal Ic [Cholestatin] Allergic Rhinitis    Amoxicillin Hives    Augmentin [Amoxicillin-Pot Clavulanate] Hives    Fluoxetine Other (See Comments)     Other reaction(s): bloating    Penicillins Rash    Wound Dressings Rash       Past Medical History:   Diagnosis Date    Anxiety     last assessed - [id number]    Hyperlipidemia     last assessed - [id number]    IBS (irritable bowel syndrome)     Low grade mucinous neoplasm of appendix     last assessed - [id number]    Nausea     Normal delivery     2014 daughter   [facility] Palpitations     last assessed - [id number]    Right bundle branch block     last assessed - [id number]    Thyroid cyst     last assessed - [id number]    Vacuum extractor delivery, delivered     2012 daughter    Varicella     childhood    Vitamin D deficiency      Past Surgical History:   Procedure Laterality Date    APPENDECTOMY      COLPOSCOPY W/ BIOPSY / CURETTAGE      Colposcopy Cervix with Biopsy(s)    LACRIMAL DUCT PROBING      surgery of the Lacrimal system    IA COLONOSCOPY FLX DX W/COLLJ SPEC WHEN PFRMD N/A 2/15/2016    Procedure: EGD AND COLONOSCOPY;  Surgeon: Triston Schmid DO;  [facility]; Service: General    SUBTOTAL COLECTOMY      Partial colectomy - with resection of appendix    TEAR DUCT SURGERY      TOOTH EXTRACTION      last assessed - [id number]    TUMOR REMOVAL  12/2015    removed from appendix     Current Outpatient Medications on File Prior to Visit   Medication Sig Dispense Refill    Cholecalciferol (VITAMIN D) 2000 units CAPS Take 3,000 Units by mouth        Polyethylene Glycol 3350 (MIRALAX PO) Take 1 packet by mouth daily as needed   Prenatal Multivit-Min-Fe-FA (PRENATAL VITAMINS) 0 8 MG tablet Take 1 tablet by mouth daily      sertraline (ZOLOFT) 25 mg tablet Take 1 tablet (25 mg total) by mouth daily 30 tablet 5     No current facility-administered medications on file prior to visit        Family History   Problem Relation Age of Onset    Heart murmur Mother         type unspecified     Cancer Mother         thyroid    Hyperlipidemia Father     Hypertension Father     Arthritis Maternal Grandmother     Coronary artery disease Maternal Grandmother     Transient ischemic attack Maternal Grandmother     Cancer Maternal Grandmother         thyroid    Hypertension Maternal Grandmother     Stroke Maternal Grandmother     Depression Paternal Grandmother     Cancer Maternal Aunt         lung    Lake syndrome Paternal Aunt     Cancer Paternal Aunt         lymphoma    Depression Sister     Cancer Paternal Grandfather         bowel    Heart disease Paternal Grandfather     Hyperlipidemia Paternal Grandfather     Hypertension Paternal Grandfather     Heart defect Maternal Aunt      Social History     Socioeconomic History    Marital status: /Civil Union     Spouse name: Not on file    Number of children: Not on file    Years of education: Not on file    Highest education level: Not on file   Occupational History    Not on file   Social Needs    Financial resource strain: Not on file   [facility] Food insecurity:     Worry: Not on file     Inability: Not on file    Transportation needs:     Medical: Not on file     Non-medical: Not on file   Tobacco Use    Smoking status: Never Smoker    Smokeless tobacco: Never Used   Substance and Sexual Activity    Alcohol use: Not Currently     Comment: rarely; drinks beer; social alcohol use    Drug use: No    Sexual activity: Yes     Partners: Male     Birth control/protection: Male Sterilization   Lifestyle    Physical activity:     Days per week: Not on file     Minutes per session: Not on file    Stress: Not on file   Relationships    Social connections:     Talks on phone: Not on file     Gets together: Not on file     Attends Baptism service: Not on file     Active member of club or organization: Not on file     Attends meetings of clubs or organizations: Not on file     Relationship status: Not on file    Intimate partner violence:     Fear of current or ex partner: Not on file     Emotionally abused: Not on file     Physically abused: Not on file     Forced sexual activity: Not on file   Other Topics Concern    Not on file   Social History Narrative    Activities: Soccer    Always uses seat belt    Drinks coffee    Exercise: Running    Exercises moderately less than 3 times a week         Vitals:    11/13/19 1628   BP: 112/68   Pulse: (!) 53   Resp: 16   SpO2: 98%   Weight: 85 7 kg (189 lb)   Height: 5' 8" (1 727 m)     Results for orders placed or performed in visit on 10/06/19   BUN   Result Value Ref Range    BUN 13 5 - 25 mg/dL   CEA   Result Value Ref Range    CEA <0 5 0 0 - 3 0 ng/mL   Creatinine, serum   Result Value Ref Range    Creatinine 0 70 0 60 - 1 30 mg/dL    eGFR 111 ml/min/1 73sq m     Weight (last 2 days)     Date/Time   Weight    11/13/19 1628   85 7 (189)            Body mass index is 28 74 kg/m²    BP      Temp      Pulse    Resp      SpO2        Vitals:    11/13/19 1628   Weight: 85 7 kg (189 lb)     Vitals:    11/13/19 1628 Weight: 85 7 kg (189 lb)       /68   Pulse (!) 53   Resp 16   Ht 5' 8" (1 727 m)   Wt 85 7 kg (189 lb)   SpO2 98%   BMI 28 74 kg/m²          Physical Exam   Constitutional: She appears well-developed and well-nourished  HENT:   Head: Normocephalic  Mouth/Throat: Oropharynx is clear and moist    Eyes: Pupils are equal, round, and reactive to light  Conjunctivae are normal  Right eye exhibits no discharge  Left eye exhibits no discharge  No scleral icterus  Neck: Neck supple  Cardiovascular: Normal rate, regular rhythm, normal heart sounds and intact distal pulses  Exam reveals no gallop and no friction rub  No murmur heard  Pulmonary/Chest: Breath sounds normal  No respiratory distress  She has no wheezes  She has no rales  Abdominal: Soft  Bowel sounds are normal  She exhibits no distension and no mass  There is no tenderness  There is no rebound and no guarding  Musculoskeletal: She exhibits no edema or deformity  Lymphadenopathy:     She has no cervical adenopathy  Neurological: She is alert  Coordination normal    Psychiatric: Her mood appears anxious  She does not exhibit a depressed mood  She expresses no suicidal ideation       left maxillary sinus tenderness with palpation, nasal congestion/postnasal drip

## 2019-11-14 PROBLEM — Z01.419 ENCOUNTER FOR GYNECOLOGICAL EXAMINATION WITHOUT ABNORMAL FINDING: Status: ACTIVE | Noted: 2019-11-14

## 2019-11-14 PROBLEM — J01.00 ACUTE NON-RECURRENT MAXILLARY SINUSITIS: Status: ACTIVE | Noted: 2019-11-14

## 2019-11-14 NOTE — ASSESSMENT & PLAN NOTE
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

## 2019-11-14 NOTE — ASSESSMENT & PLAN NOTE
Today I did  patient about getting adequate amounts of sleep, try her best to simplify her life, I have advised her start the Zoloft 25 mg once daily at this point time she does not want to change to a different medication    No SI

## 2019-11-14 NOTE — ASSESSMENT & PLAN NOTE
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

## 2019-11-27 ENCOUNTER — OFFICE VISIT (OUTPATIENT)
Dept: URGENT CARE | Age: 39
End: 2019-11-27
Payer: COMMERCIAL

## 2019-11-27 VITALS
HEIGHT: 68 IN | RESPIRATION RATE: 18 BRPM | DIASTOLIC BLOOD PRESSURE: 65 MMHG | BODY MASS INDEX: 28.19 KG/M2 | OXYGEN SATURATION: 98 % | HEART RATE: 63 BPM | SYSTOLIC BLOOD PRESSURE: 113 MMHG | TEMPERATURE: 97.4 F | WEIGHT: 186 LBS

## 2019-11-27 DIAGNOSIS — J02.8 ACUTE BACTERIAL PHARYNGITIS: ICD-10-CM

## 2019-11-27 DIAGNOSIS — B96.89 ACUTE BACTERIAL PHARYNGITIS: ICD-10-CM

## 2019-11-27 DIAGNOSIS — J02.9 SORE THROAT: Primary | ICD-10-CM

## 2019-11-27 LAB — S PYO AG THROAT QL: NEGATIVE

## 2019-11-27 PROCEDURE — 87070 CULTURE OTHR SPECIMN AEROBIC: CPT

## 2019-11-27 PROCEDURE — S9083 URGENT CARE CENTER GLOBAL: HCPCS | Performed by: PHYSICIAN ASSISTANT

## 2019-11-27 PROCEDURE — 87880 STREP A ASSAY W/OPTIC: CPT | Performed by: PHYSICIAN ASSISTANT

## 2019-11-27 PROCEDURE — G0382 LEV 3 HOSP TYPE B ED VISIT: HCPCS | Performed by: PHYSICIAN ASSISTANT

## 2019-11-27 RX ORDER — AZITHROMYCIN 250 MG/1
TABLET, FILM COATED ORAL
Qty: 6 TABLET | Refills: 0 | Status: SHIPPED | OUTPATIENT
Start: 2019-11-27 | End: 2019-12-03

## 2019-11-27 NOTE — PROGRESS NOTES
[facility]        NAME: Mechelle Hitchcock is a 42 y o  female  : 1980    MRN: [id number]  DATE: 2019  TIME: 12:42 PM    Assessment and Plan   Sore throat [J02 9]  1  Sore throat  POCT rapid strepA    Throat culture   2  Acute bacterial pharyngitis  azithromycin (ZITHROMAX) 250 mg tablet         Patient Instructions     -start antibiotics as directed  -drink plenty of fluids  -probiotics  Follow up with PCP in 3-5 days  Proceed to  ER if symptoms worsen  Chief Complaint     Chief Complaint   Patient presents with   Keturah Whitten     Was seen couple weeks ago hoarse, sinus infection, her pcp put her on zpack   It cleared up and she finished antibiotic but as of  she has a sore throat, can't swallow, hurts and sees white spots in the back of her throat  Took halls cough drops  History of Present Illness       Patient reports she was just on a z-skyler for a sinus infection which is getting better  She complains of a sore throat that started 2 days ago  She denies any F,C, N,V,D,SOB,Cp  She is breastfeeding  Review of Systems   Review of Systems   Constitutional: Negative  HENT: Positive for sore throat  Respiratory: Negative  Cardiovascular: Negative  Gastrointestinal: Negative  Musculoskeletal: Negative  Neurological: Negative  Psychiatric/Behavioral: Negative  Current Medications       Current Outpatient Medications:     Cholecalciferol (VITAMIN D) 2000 units CAPS, Take 3,000 Units by mouth  , Disp: , Rfl:     Polyethylene Glycol 3350 (MIRALAX PO), Take 1 packet by mouth daily as needed  , Disp: , Rfl:     Prenatal Multivit-Min-Fe-FA (PRENATAL VITAMINS) 0 8 MG tablet, Take 1 tablet by mouth daily, Disp: , Rfl:     sertraline (ZOLOFT) 25 mg tablet, Take 1 tablet (25 mg total) by mouth daily, Disp: 30 tablet, Rfl: 5    azithromycin (ZITHROMAX) 250 mg tablet, Take 2 tablets today then 1 tablet daily x 4 days, Disp: 6 tablet, Rfl: 0    Current Allergies     Allergies as of 11/27/2019 - Reviewed 11/27/2019   Allergen Reaction Noted    Seasonal ic [cholestatin] Allergic Rhinitis 06/20/2014    Amoxicillin Hives 02/15/2016    Augmentin [amoxicillin-pot clavulanate] Hives 02/15/2016    Fluoxetine Other (See Comments) 05/08/2014    Penicillins Rash 06/03/2014    Wound dressings Rash 02/08/2014            The following portions of the patient's history were reviewed and updated as appropriate: allergies, current medications, past family history, past medical history, past social history, past surgical history and problem list      Past Medical History:   Diagnosis Date    Anxiety     last assessed - [id number]    Hyperlipidemia     last assessed - 96Myg0738    IBS (irritable bowel syndrome)     Low grade mucinous neoplasm of appendix     last assessed - [id number]    Nausea     Normal delivery     2014 daughter   Patric Singh Palpitations     last assessed - [id number]    Right bundle branch block     last assessed - [id number]    Thyroid cyst     last assessed - [id number]    Vacuum extractor delivery, delivered     2012 daughter    Varicella     childhood    Vitamin D deficiency        Past Surgical History:   Procedure Laterality Date    APPENDECTOMY      COLPOSCOPY W/ BIOPSY / CURETTAGE      Colposcopy Cervix with Biopsy(s)    LACRIMAL DUCT PROBING      surgery of the Lacrimal system    SC COLONOSCOPY FLX DX W/COLLJ SPEC WHEN PFRMD N/A 2/15/2016    Procedure: EGD AND COLONOSCOPY;  Surgeon: Kitty Reeder DO;  [facility];   Service: General    SUBTOTAL COLECTOMY      Partial colectomy - with resection of appendix    TEAR DUCT SURGERY      TOOTH EXTRACTION      last assessed - [id number]    TUMOR REMOVAL  12/2015    removed from appendix       Family History   Problem Relation Age of Onset    Heart murmur Mother         type unspecified     Cancer Mother         thyroid    Hyperlipidemia Father     Hypertension Father    Patric Singh Arthritis Maternal Grandmother     Coronary artery disease Maternal Grandmother     Transient ischemic attack Maternal Grandmother     Cancer Maternal Grandmother         thyroid    Hypertension Maternal Grandmother     Stroke Maternal Grandmother     Depression Paternal Grandmother     Cancer Maternal Aunt         lung    Lake syndrome Paternal Aunt     Cancer Paternal Aunt         lymphoma    Depression Sister     Cancer Paternal Grandfather         bowel    Heart disease Paternal Grandfather     Hyperlipidemia Paternal Grandfather     Hypertension Paternal Grandfather     Heart defect Maternal Aunt          Medications have been verified  Objective   /65 (BP Location: Left arm, Patient Position: Sitting)   Pulse 63   Temp (!) 97 4 °F (36 3 °C) (Temporal)   Resp 18   Ht 5' 8" (1 727 m)   Wt 84 4 kg (186 lb)   SpO2 98%   Breastfeeding? Yes Comment: Currently breasfeeding  BMI 28 28 kg/m²        Physical Exam     Physical Exam   Constitutional: She is oriented to person, place, and time  She appears well-developed and well-nourished  HENT:   Head: Normocephalic and atraumatic  Right Ear: Tympanic membrane normal    Left Ear: Tympanic membrane normal    Mouth/Throat: Posterior oropharyngeal erythema present  Tonsils are 2+ on the right  Tonsils are 2+ on the left  No tonsillar exudate  Cardiovascular: Normal rate, regular rhythm and normal heart sounds  Pulmonary/Chest: Effort normal and breath sounds normal    Neurological: She is alert and oriented to person, place, and time  Skin: Skin is warm and dry  Psychiatric: She has a normal mood and affect  Her behavior is normal    Nursing note and vitals reviewed

## 2019-11-27 NOTE — PATIENT INSTRUCTIONS
-start antibiotics as directed  -1  Drink plenty fluids  2   Take probiotics [i e  Yogurt, Acidophilus, Florastor (liquid)] daily  3   Over-the-counter medications as needed for symptomatic care  4    Advance activities as tolerated  5    Follow-up with your primary care physician in 3-4 days  6   Go to emergency room if symptoms are worsening

## 2019-11-29 LAB — BACTERIA THROAT CULT: NORMAL

## 2019-12-31 DIAGNOSIS — F41.9 ANXIETY: ICD-10-CM

## 2019-12-31 RX ORDER — SERTRALINE HYDROCHLORIDE 25 MG/1
TABLET, FILM COATED ORAL
Qty: 30 TABLET | Refills: 0 | Status: SHIPPED | OUTPATIENT
Start: 2019-12-31 | End: 2020-01-15

## 2020-01-15 DIAGNOSIS — F41.9 ANXIETY: ICD-10-CM

## 2020-01-15 RX ORDER — SERTRALINE HYDROCHLORIDE 25 MG/1
TABLET, FILM COATED ORAL
Qty: 90 TABLET | Refills: 0 | Status: SHIPPED | OUTPATIENT
Start: 2020-01-15 | End: 2020-05-06 | Stop reason: SDUPTHER

## 2020-02-14 ENCOUNTER — ANNUAL EXAM (OUTPATIENT)
Dept: OBGYN CLINIC | Facility: CLINIC | Age: 40
End: 2020-02-14
Payer: COMMERCIAL

## 2020-02-14 VITALS
BODY MASS INDEX: 29.73 KG/M2 | SYSTOLIC BLOOD PRESSURE: 115 MMHG | WEIGHT: 196.2 LBS | HEIGHT: 68 IN | DIASTOLIC BLOOD PRESSURE: 80 MMHG

## 2020-02-14 DIAGNOSIS — Z11.3 SCREENING FOR STD (SEXUALLY TRANSMITTED DISEASE): ICD-10-CM

## 2020-02-14 DIAGNOSIS — Z01.419 ENCOUNTER FOR GYNECOLOGICAL EXAMINATION (GENERAL) (ROUTINE) WITHOUT ABNORMAL FINDINGS: Primary | ICD-10-CM

## 2020-02-14 DIAGNOSIS — Z01.419 ENCOUNTER FOR GYNECOLOGICAL EXAMINATION WITHOUT ABNORMAL FINDING: ICD-10-CM

## 2020-02-14 PROCEDURE — 3008F BODY MASS INDEX DOCD: CPT | Performed by: PHYSICIAN ASSISTANT

## 2020-02-14 PROCEDURE — S0612 ANNUAL GYNECOLOGICAL EXAMINA: HCPCS | Performed by: PHYSICIAN ASSISTANT

## 2020-02-14 NOTE — PROGRESS NOTES
Luh Leyva  1980      CC:  Yearly exam    S:  42 y o  female here for yearly exam  Her cycles are regular, heavy but not crampy  She is still nursing her 20 month old  We discussed using Aleve two po BID for her heavy days  Sexual activity: She is sexually active with her  without pain, bleeding or dryness  Contraception: She uses vasectomy for contraception  Last Pap 6/22/16 neg/neg  Last Mammo never    We reviewed [facility] guidelines for Pap testing today  Family hx of breast cancer: no  Family hx of ovarian cancer: no  Family hx of colon cancer:  no    Current Outpatient Medications:     Cholecalciferol (VITAMIN D) 2000 units CAPS, Take 3,000 Units by mouth  , Disp: , Rfl:     Polyethylene Glycol 3350 (MIRALAX PO), Take 1 packet by mouth daily as needed  , Disp: , Rfl:     Prenatal Multivit-Min-Fe-FA (PRENATAL VITAMINS) 0 8 MG tablet, Take 1 tablet by mouth daily, Disp: , Rfl:     sertraline (ZOLOFT) 25 mg tablet, TAKE 1 TABLET BY MOUTH EVERY DAY, Disp: 90 tablet, Rfl: 0  Social History     Socioeconomic History    Marital status: /Civil Union     Spouse name: Not on file    Number of children: Not on file    Years of education: Not on file    Highest education level: Not on file   Occupational History    Not on file   Social Needs    Financial resource strain: Not on file    Food insecurity:     Worry: Not on file     Inability: Not on file    Transportation needs:     Medical: Not on file     Non-medical: Not on file   Tobacco Use    Smoking status: Never Smoker    Smokeless tobacco: Never Used   Substance and Sexual Activity    Alcohol use: Yes     Comment: rarely; drinks beer; social alcohol use    Drug use: No    Sexual activity: Yes     Partners: Male     Birth control/protection: Male Sterilization   Lifestyle    Physical activity:     Days per week: Not on file     Minutes per session: Not on file    Stress: Not on file   Relationships    Social connections:     Talks on phone: Not on file     Gets together: Not on file     Attends Gnosticism service: Not on file     Active member of club or organization: Not on file     Attends meetings of clubs or organizations: Not on file     Relationship status: Not on file    Intimate partner violence:     Fear of current or ex partner: Not on file     Emotionally abused: Not on file     Physically abused: Not on file     Forced sexual activity: Not on file   Other Topics Concern    Not on file   Social History Narrative    Activities: Soccer    Always uses seat belt    Drinks coffee    Exercise: Running    Exercises moderately less than 3 times a week         Family History   Problem Relation Age of Onset    Heart murmur Mother         type unspecified     Cancer Mother         thyroid    Hyperlipidemia Father     Hypertension Father     Arthritis Maternal Grandmother     Coronary artery disease Maternal Grandmother     Transient ischemic attack Maternal Grandmother     Cancer Maternal Grandmother         thyroid    Hypertension Maternal Grandmother     Stroke Maternal Grandmother     Depression Paternal Grandmother     Cancer Maternal Aunt         lung    Lake syndrome Paternal Aunt     Cancer Paternal Aunt         lymphoma    Depression Sister     Cancer Paternal Grandfather         bowel    Heart disease Paternal Grandfather     Hyperlipidemia Paternal Grandfather     Hypertension Paternal Grandfather     Heart defect Maternal Aunt       Past Medical History:   Diagnosis Date    Anxiety     last assessed - 19Jan2018    Hyperlipidemia     last assessed - [id number]    IBS (irritable bowel syndrome)     Low grade mucinous neoplasm of appendix     last assessed - [id number]    Nausea     Normal delivery     2014 daughter    Palpitations     last assessed - [id number]    Right bundle branch block     last assessed - [id number]    Thyroid cyst     last assessed - 15Oct2012    Vacuum extractor delivery, delivered     2012 daughter    Varicella     childhood    Vitamin D deficiency         Review of Systems   Respiratory: Negative  Cardiovascular: Negative  Gastrointestinal: Negative for constipation and diarrhea  Genitourinary: Negative for difficulty urinating, pelvic pain, vaginal bleeding, vaginal discharge, itching or odor  O:  Blood pressure 115/80, height 5' 8" (1 727 m), weight 89 kg (196 lb 3 2 oz), currently breastfeeding  Patient appears well and is not in distress  Neck is supple without masses  Breasts are symmetrical without mass, tenderness, nipple discharge, skin changes or adenopathy  Abdomen is soft and nontender without masses  External genitals are normal without lesions or rashes  Urethral meatus and urethra are normal  Bladder is normal to palpation  Vagina is normal without discharge or bleeding  Cervix is normal without discharge or lesion  Uterus is normal, mobile, nontender without palpable mass  Adnexa are normal, nontender, without palpable mass  A:  Yearly exam      P:   Pap 2021     RTO one year for yearly exam or sooner as needed

## 2020-05-06 DIAGNOSIS — F41.9 ANXIETY: ICD-10-CM

## 2020-05-06 RX ORDER — SERTRALINE HYDROCHLORIDE 25 MG/1
25 TABLET, FILM COATED ORAL DAILY
Qty: 90 TABLET | Refills: 0 | Status: SHIPPED | OUTPATIENT
Start: 2020-05-06 | End: 2020-07-31 | Stop reason: SDUPTHER

## 2020-07-31 ENCOUNTER — OFFICE VISIT (OUTPATIENT)
Dept: INTERNAL MEDICINE CLINIC | Facility: CLINIC | Age: 40
End: 2020-07-31
Payer: COMMERCIAL

## 2020-07-31 VITALS
DIASTOLIC BLOOD PRESSURE: 76 MMHG | SYSTOLIC BLOOD PRESSURE: 124 MMHG | TEMPERATURE: 98 F | HEART RATE: 65 BPM | BODY MASS INDEX: 30.74 KG/M2 | WEIGHT: 202.8 LBS | HEIGHT: 68 IN | OXYGEN SATURATION: 98 %

## 2020-07-31 DIAGNOSIS — E78.5 HYPERLIPIDEMIA, UNSPECIFIED HYPERLIPIDEMIA TYPE: Primary | ICD-10-CM

## 2020-07-31 DIAGNOSIS — Z01.419 ENCOUNTER FOR GYNECOLOGICAL EXAMINATION WITHOUT ABNORMAL FINDING: ICD-10-CM

## 2020-07-31 DIAGNOSIS — F41.9 ANXIETY: ICD-10-CM

## 2020-07-31 DIAGNOSIS — E66.09 CLASS 1 OBESITY DUE TO EXCESS CALORIES WITHOUT SERIOUS COMORBIDITY WITH BODY MASS INDEX (BMI) OF 30.0 TO 30.9 IN ADULT: ICD-10-CM

## 2020-07-31 DIAGNOSIS — C18.1 MALIGNANT TUMOR OF APPENDIX (HCC): ICD-10-CM

## 2020-07-31 DIAGNOSIS — Z00.00 WELLNESS EXAMINATION: ICD-10-CM

## 2020-07-31 PROCEDURE — 1036F TOBACCO NON-USER: CPT | Performed by: INTERNAL MEDICINE

## 2020-07-31 PROCEDURE — 99395 PREV VISIT EST AGE 18-39: CPT | Performed by: INTERNAL MEDICINE

## 2020-07-31 PROCEDURE — 3008F BODY MASS INDEX DOCD: CPT | Performed by: INTERNAL MEDICINE

## 2020-07-31 PROCEDURE — 99213 OFFICE O/P EST LOW 20 MIN: CPT | Performed by: INTERNAL MEDICINE

## 2020-07-31 RX ORDER — MINOCYCLINE HYDROCHLORIDE 100 MG/1
100 CAPSULE ORAL 2 TIMES DAILY
COMMUNITY
Start: 2020-06-08 | End: 2021-03-11 | Stop reason: CLARIF

## 2020-07-31 RX ORDER — TOBRAMYCIN 3 MG/ML
SOLUTION/ DROPS OPHTHALMIC
COMMUNITY
Start: 2020-07-26 | End: 2021-03-11 | Stop reason: CLARIF

## 2020-07-31 NOTE — PROGRESS NOTES
Assessment/Plan:    Wellness examination  Assessment and plan 1  Health maintenance annual wellness examination overall the patient is clinically stable and doing well, we encouraged the patient to follow a healthy and balanced diet  We recommend that the patient exercise routinely approximately 30 minutes 5 times per week   We have reviewed the patient's vaccines and have made recommendations for updates if necessary   annual flu shot     We will be ordering screening laboratories which are age appropriate  Return to the office in      call if any problems  Problem List Items Addressed This Visit        Other    Anxiety    Relevant Medications    sertraline (ZOLOFT) 50 mg tablet    Hyperlipidemia - Primary    Relevant Orders    Comprehensive metabolic panel    Lipid Panel with Direct LDL reflex    Encounter for gynecological examination without abnormal finding    Wellness examination     Assessment and plan 1  Health maintenance annual wellness examination overall the patient is clinically stable and doing well, we encouraged the patient to follow a healthy and balanced diet  We recommend that the patient exercise routinely approximately 30 minutes 5 times per week   We have reviewed the patient's vaccines and have made recommendations for updates if necessary   annual flu shot     We will be ordering screening laboratories which are age appropriate  Return to the office in      call if any problems  Other Visit Diagnoses     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[facility])        Relevant Orders    Ambulatory referral to Gastroenterology          RTO in eat weeks call if any problems  Subjective:      Patient ID: Peter Judge is a 47 y o  female  HPI annual wellness examination completed today, overall the patient is clinically stable except for increasing anxiety she reports me that she can not drive a car safely and wears a seatbelt  She sees a dentist routinely brushes/losses her teeth    She reports me trying to follow healthy diet less exercise  car , home, sun screen, will helmet,  No smoking etoh social ,  Running last year less,  Got pelaton and loves it wants  To leave and come back ,  Anxiety, frustrating, no si     The following portions of the patient's history were reviewed and updated as appropriate: allergies, current medications, past family history, past medical history, past social history, past surgical history and problem list     Review of Systems   Constitutional: Negative for activity change, appetite change and unexpected weight change  Respiratory: Negative for cough and shortness of breath  Cardiovascular: Negative for chest pain  Gastrointestinal: Negative for abdominal pain, diarrhea, nausea and vomiting  Neurological: Negative for dizziness, light-headedness and headaches  Psychiatric/Behavioral: Negative for suicidal ideas  The patient is nervous/anxious  Objective:    No follow-ups on file  No results found        Allergies   Allergen Reactions    Seasonal Ic [Cholestatin] Allergic Rhinitis    Amoxicillin Hives    Augmentin [Amoxicillin-Pot Clavulanate] Hives    Fluoxetine Other (See Comments)     Other reaction(s): bloating    Penicillins Rash    Wound Dressings Rash       Past Medical History:   Diagnosis Date    Anxiety     last assessed - [id number]    Hyperlipidemia     last assessed - [id number]    IBS (irritable bowel syndrome)     Low grade mucinous neoplasm of appendix     last assessed - [id number]    Nausea     Normal delivery     2014 daughter   [place] Palpitations     last assessed - [id number]    Right bundle branch block     last assessed - [id number]    Thyroid cyst     last assessed - [id number]    Vacuum extractor delivery, delivered     2012 daughter    Varicella     childhood    Vitamin D deficiency      Past Surgical History:   Procedure Laterality Date    APPENDECTOMY      COLPOSCOPY W/ BIOPSY / CURETTAGE      Colposcopy Cervix with Biopsy(s)    LACRIMAL DUCT PROBING      surgery of the Lacrimal system    VT COLONOSCOPY FLX DX W/COLLJ SPEC WHEN PFRMD N/A 2/15/2016    Procedure: EGD AND COLONOSCOPY;  Surgeon: Jesenia Orozco DO;  Location: [facility]; Service: General    SUBTOTAL COLECTOMY      Partial colectomy - with resection of appendix    TEAR DUCT SURGERY      TOOTH EXTRACTION      last assessed - [id number]    TUMOR REMOVAL  12/2015    removed from appendix     Current Outpatient Medications on File Prior to Visit   Medication Sig Dispense Refill    Cholecalciferol (VITAMIN D) 2000 units CAPS Take 3,000 Units by mouth        minocycline (MINOCIN) 100 mg capsule Take 100 mg by mouth 2 (two) times a day      Polyethylene Glycol 3350 (MIRALAX PO) Take 1 packet by mouth daily as needed   Prenatal Multivit-Min-Fe-FA (PRENATAL VITAMINS) 0 8 MG tablet Take 1 tablet by mouth daily      tobramycin (TOBREX) 0 3 % SOLN        No current facility-administered medications on file prior to visit        Family History   Problem Relation Age of Onset    Heart murmur Mother         type unspecified     Cancer Mother         thyroid    Hyperlipidemia Father     Hypertension Father     Arthritis Maternal Grandmother     Coronary artery disease Maternal Grandmother     Transient ischemic attack Maternal Grandmother     Cancer Maternal Grandmother         thyroid    Hypertension Maternal Grandmother     Stroke Maternal Grandmother     Depression Paternal Grandmother     Cancer Maternal Aunt         lung    Lake syndrome Paternal Aunt     Cancer Paternal Aunt         lymphoma    Depression Sister     Cancer Paternal Grandfather         bowel    Heart disease Paternal Grandfather     Hyperlipidemia Paternal Grandfather     Hypertension Paternal Grandfather     Heart defect Maternal Aunt      Social History     Socioeconomic History    Marital status: /Civil Union     Spouse name: Not on file    Number of children: Not on file    Years of education: Not on file    Highest education level: Not on file   Occupational History    Not on file   Social Needs    Financial resource strain: Not on file    Food insecurity     Worry: Not on file     Inability: Not on file    Transportation needs     Medical: Not on file     Non-medical: Not on file   Tobacco Use    Smoking status: Never Smoker    Smokeless tobacco: Never Used   Substance and Sexual Activity    Alcohol use: Yes     Comment: rarely; drinks beer; social alcohol use    Drug use: No    Sexual activity: Yes     Partners: Male     Birth control/protection: Male Sterilization   Lifestyle    Physical activity     Days per week: Not on file     Minutes per session: Not on file    Stress: Not on file   Relationships    Social connections     Talks on phone: Not on file     Gets together: Not on file     Attends Jehovah's witness service: Not on file     Active member of club or organization: Not on file     Attends meetings of clubs or organizations: Not on file     Relationship status: Not on file    Intimate partner violence     Fear of current or ex partner: Not on file     Emotionally abused: Not on file     Physically abused: Not on file     Forced sexual activity: Not on file   Other Topics Concern    Not on file   Social History Narrative    Activities: Soccer    Always uses seat belt    Drinks coffee    Exercise: Running    Exercises moderately less than 3 times a week         Vitals:    07/31/20 1333   BP: 124/76   Pulse: 65   Temp: 98 °F (36 7 °C)   SpO2: 98%   Weight: 92 kg (202 lb 12 8 oz)   Height: 5' 8"     Results for orders placed or performed in visit on 11/27/19   Throat culture    Specimen: Throat   Result Value Ref Range    Throat Culture Negative for beta-hemolytic Streptococcus    POCT rapid strepA   Result Value Ref Range     RAPID STREP A Negative Negative     Weight (last 2 days)     None        Body mass index is 30 84 kg/m²    BP Temp      Pulse     Resp      SpO2        Vitals:    07/31/20 1333   Weight: 92 kg (202 lb 12 8 oz)     Vitals:    07/31/20 1333   Weight: 92 kg (202 lb 12 8 oz)       /76   Pulse 65   Temp 98 °F (36 7 °C)   Ht 5' 8"   Wt 92 kg (202 lb 12 8 oz)   SpO2 98%   BMI 30 84 kg/m²          Physical Exam   Constitutional: She appears well-developed  HENT:   Head: Normocephalic  Eyes: Pupils are equal, round, and reactive to light  Conjunctivae are normal  Right eye exhibits no discharge  Left eye exhibits no discharge  No scleral icterus  Neck: Neck supple  Cardiovascular: Normal rate, regular rhythm and normal heart sounds  Exam reveals no gallop and no friction rub  No murmur heard  Pulmonary/Chest: Breath sounds normal  No respiratory distress  She has no wheezes  She has no rales  Abdominal: Soft  Bowel sounds are normal  She exhibits no distension and no mass  There is no abdominal tenderness  There is no rebound and no guarding  Musculoskeletal:         General: No deformity  Lymphadenopathy:     She has no cervical adenopathy  Neurological: She is alert  Coordination normal    Psychiatric: Her mood appears anxious  She expresses no suicidal ideation

## 2020-08-03 PROBLEM — C18.1: Status: ACTIVE | Noted: 2020-08-03

## 2020-08-03 NOTE — ASSESSMENT & PLAN NOTE
Assessment and plan 1  Health maintenance annual wellness examination overall the patient is clinically stable and doing well, we encouraged the patient to follow a healthy and balanced diet  We recommend that the patient exercise routinely approximately 30 minutes 5 times per week   We have reviewed the patient's vaccines and have made recommendations for updates if necessary   annual flu shot     We will be ordering screening laboratories which are age appropriate  Return to the office in      call if any problems

## 2020-08-03 NOTE — ASSESSMENT & PLAN NOTE
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

## 2020-08-03 NOTE — PROGRESS NOTES
Assessment/Plan:    Wellness examination  Assessment and plan 1  Health maintenance annual wellness examination overall the patient is clinically stable and doing well, we encouraged the patient to follow a healthy and balanced diet  We recommend that the patient exercise routinely approximately 30 minutes 5 times per week   We have reviewed the patient's vaccines and have made recommendations for updates if necessary   annual flu shot     We will be ordering screening laboratories which are age appropriate  Return to the office in      call if any problems  Hyperlipidemia  Counseled patient regarding a low-cholesterol diet will check a lipid profile    Encounter for gynecological examination without abnormal finding  Counseled, see OBGYN routine examination    Anxiety  Increase levels of 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 also her  is under a lot of stress and will be seeking mental health help also no SI will increase Zoloft to 50 mg once daily RTO in 4 weeks call if any problems    Class 1 obesity due to excess calories without serious comorbidity with body mass index (BMI) of 30 0 to 30 9 in adult  Obesity -I have counseled patient following healthy and balanced diet, I would like the patient to lose weight, I would like the patient exercise routinely; we will continue monitor the patient's progress      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([address] )  Have the patient see GI Dr Jericho Rock for follow-up         Problem List Items Addressed This Visit        Digestive    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[facility])     Have the patient see GI Dr Jericho Rock for follow-up         Relevant Orders    Ambulatory referral to Gastroenterology       Other    Anxiety     Increase levels of 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 also her  is under a lot of stress and will be seeking mental health help also no SI will increase Zoloft to 50 mg once daily RTO in 4 weeks call if any problems         Relevant Medications    sertraline (ZOLOFT) 50 mg tablet    Hyperlipidemia - Primary     Counseled patient regarding a low-cholesterol diet will check a lipid profile         Relevant Orders    Comprehensive metabolic panel    Lipid Panel with Direct LDL reflex    Class 1 obesity due to excess calories without serious comorbidity with body mass index (BMI) of 30 0 to 30 9 in adult     Obesity -I have counseled patient following healthy and balanced diet, I would like the patient to lose weight, I would like the patient exercise routinely; we will continue monitor the patient's progress  Encounter for gynecological examination without abnormal finding     Counseled, see OBGYN routine examination         Wellness examination     Assessment and plan 1  Health maintenance annual wellness examination overall the patient is clinically stable and doing well, we encouraged the patient to follow a healthy and balanced diet  We recommend that the patient exercise routinely approximately 30 minutes 5 times per week   We have reviewed the patient's vaccines and have made recommendations for updates if necessary   annual flu shot     We will be ordering screening laboratories which are age appropriate  Return to the office in      call if any problems  RTO in 4-6 weeks call if any problems  Subjective:      Patient ID: Yris Nicole is a 47 y o  female  HPI 41-year old female coming in for a follow up office visit regarding anxiety, hyperlipidemia, malignant tumor of the appendix, obesity; The patient reports me compliant taking medications without untoward side effects the  The patient is here to review his medical condition, update me on the medical condition and the patient reports me no hospitalizations and no ER visits  She reports me her diet could be better and she has not been exercising as much    She reports me increasing levels of anxiety as a school L approaching 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, also concerned about her children  She reports me her  is a  and under lot of stress she reports me there is a lot of stress in the household he is seeking mental health help today also    The following portions of the patient's history were reviewed and updated as appropriate: allergies, current medications, past family history, past medical history, past social history, past surgical history and problem list     Review of Systems   Constitutional: Negative for activity change, appetite change and unexpected weight change  HENT: Negative for congestion and postnasal drip  Respiratory: Negative for cough and shortness of breath  Cardiovascular: Negative for chest pain  Gastrointestinal: Negative for abdominal pain, diarrhea, nausea and vomiting  Neurological: Negative for dizziness, light-headedness and headaches  Psychiatric/Behavioral: Negative for suicidal ideas  The patient is nervous/anxious  Objective:    No follow-ups on file  No results found        Allergies   Allergen Reactions    Seasonal Ic [Cholestatin] Allergic Rhinitis    Amoxicillin Hives    Augmentin [Amoxicillin-Pot Clavulanate] Hives    Fluoxetine Other (See Comments)     Other reaction(s): bloating    Penicillins Rash    Wound Dressings Rash       Past Medical History:   Diagnosis Date    Anxiety     last assessed - [id number]    Hyperlipidemia     last assessed - [id number]    IBS (irritable bowel syndrome)     Low grade mucinous neoplasm of appendix     last assessed - [id number]    Nausea     Normal delivery     2014 daughter   Jeniffer Courser Palpitations     last assessed - [id number]    Right bundle branch block     last assessed - [id number]    Thyroid cyst     last assessed - [id number]    Vacuum extractor delivery, delivered     2012 daughter    Varicella     childhood    Vitamin D deficiency      Past Surgical History: Procedure Laterality Date    APPENDECTOMY      COLPOSCOPY W/ BIOPSY / CURETTAGE      Colposcopy Cervix with Biopsy(s)    LACRIMAL DUCT PROBING      surgery of the Lacrimal system    MD COLONOSCOPY FLX DX W/COLLJ SPEC WHEN PFRMD N/A 2/15/2016    Procedure: EGD AND COLONOSCOPY;  Surgeon: Darius Adams DO;  Location:  GI LAB; Service: General    SUBTOTAL COLECTOMY      Partial colectomy - with resection of appendix    TEAR DUCT SURGERY      TOOTH EXTRACTION      last assessed - [id number]    TUMOR REMOVAL  12/2015    removed from appendix     Current Outpatient Medications on File Prior to Visit   Medication Sig Dispense Refill    Cholecalciferol (VITAMIN D) 2000 units CAPS Take 3,000 Units by mouth        minocycline (MINOCIN) 100 mg capsule Take 100 mg by mouth 2 (two) times a day      Polyethylene Glycol 3350 (MIRALAX PO) Take 1 packet by mouth daily as needed   Prenatal Multivit-Min-Fe-FA (PRENATAL VITAMINS) 0 8 MG tablet Take 1 tablet by mouth daily      tobramycin (TOBREX) 0 3 % SOLN        No current facility-administered medications on file prior to visit        Family History   Problem Relation Age of Onset    Heart murmur Mother         type unspecified     Cancer Mother         thyroid    Hyperlipidemia Father     Hypertension Father     Arthritis Maternal Grandmother     Coronary artery disease Maternal Grandmother     Transient ischemic attack Maternal Grandmother     Cancer Maternal Grandmother         thyroid    Hypertension Maternal Grandmother     Stroke Maternal Grandmother     Depression Paternal Grandmother     Cancer Maternal Aunt         lung    Lake syndrome Paternal Aunt     Cancer Paternal Aunt         lymphoma    Depression Sister     Cancer Paternal Grandfather         bowel    Heart disease Paternal Grandfather     Hyperlipidemia Paternal Grandfather     Hypertension Paternal Grandfather     Heart defect Maternal Aunt      Social History Socioeconomic History    Marital status: /Civil Union     Spouse name: Not on file    Number of children: Not on file    Years of education: Not on file    Highest education level: Not on file   Occupational History    Not on file   Social Needs    Financial resource strain: Not on file    Food insecurity     Worry: Not on file     Inability: Not on file    Transportation needs     Medical: Not on file     Non-medical: Not on file   Tobacco Use    Smoking status: Never Smoker    Smokeless tobacco: Never Used   Substance and Sexual Activity    Alcohol use: Yes     Comment: rarely; drinks beer; social alcohol use    Drug use: No    Sexual activity: Yes     Partners: Male     Birth control/protection: Male Sterilization   Lifestyle    Physical activity     Days per week: Not on file     Minutes per session: Not on file    Stress: Not on file   Relationships    Social connections     Talks on phone: Not on file     Gets together: Not on file     Attends [employer] service: Not on file     Active member of club or organization: Not on file     Attends meetings of clubs or organizations: Not on file     Relationship status: Not on file    Intimate partner violence     Fear of current or ex partner: Not on file     Emotionally abused: Not on file     Physically abused: Not on file     Forced sexual activity: Not on file   Other Topics Concern    Not on file   Social History Narrative    Activities: Soccer    Always uses seat belt    Drinks coffee    Exercise: Running    Exercises moderately less than 3 times a week         Vitals:    07/31/20 1333   BP: 124/76   Pulse: 65   Temp: 98 °F (36 7 °C)   SpO2: 98%   Weight: 92 kg (202 lb 12 8 oz)   Height: 5' 8"     Results for orders placed or performed in visit on 11/27/19   Throat culture    Specimen: Throat   Result Value Ref Range    Throat Culture Negative for beta-hemolytic Streptococcus    POCT rapid strepA   Result Value Ref Range     RAPID STREP A Negative Negative     Weight (last 2 days)     None        Body mass index is 30 84 kg/m²  BP      Temp      Pulse     Resp      SpO2        Vitals:    07/31/20 1333   Weight: 92 kg (202 lb 12 8 oz)     Vitals:    07/31/20 1333   Weight: 92 kg (202 lb 12 8 oz)       /76   Pulse 65   Temp 98 °F (36 7 °C)   Ht 5' 8"   Wt 92 kg (202 lb 12 8 oz)   SpO2 98%   BMI 30 84 kg/m²          Physical Exam   Constitutional: She appears well-developed  HENT:   Head: Normocephalic  Eyes: Pupils are equal, round, and reactive to light  Conjunctivae are normal  Right eye exhibits no discharge  Left eye exhibits no discharge  No scleral icterus  Neck: Neck supple  Cardiovascular: Normal rate, regular rhythm and normal heart sounds  Exam reveals no gallop and no friction rub  No murmur heard  Pulmonary/Chest: Breath sounds normal  No respiratory distress  She has no wheezes  She has no rales  Abdominal: Soft  Bowel sounds are normal  She exhibits no distension and no mass  There is no abdominal tenderness  There is no rebound and no guarding  Musculoskeletal:         General: No deformity  Lymphadenopathy:     She has no cervical adenopathy  Neurological: She is alert  Coordination normal    Psychiatric: Her mood appears anxious  She expresses no suicidal ideation

## 2020-08-18 DIAGNOSIS — F41.9 ANXIETY: ICD-10-CM

## 2020-08-18 RX ORDER — SERTRALINE HYDROCHLORIDE 25 MG/1
TABLET, FILM COATED ORAL
Qty: 30 TABLET | Refills: 2 | OUTPATIENT
Start: 2020-08-18

## 2020-08-26 DIAGNOSIS — F41.9 ANXIETY: ICD-10-CM

## 2020-09-23 DIAGNOSIS — F41.9 ANXIETY: ICD-10-CM

## 2020-09-29 ENCOUNTER — OFFICE VISIT (OUTPATIENT)
Dept: URGENT CARE | Age: 40
End: 2020-09-29
Payer: COMMERCIAL

## 2020-09-29 VITALS
OXYGEN SATURATION: 100 % | DIASTOLIC BLOOD PRESSURE: 72 MMHG | HEART RATE: 59 BPM | RESPIRATION RATE: 18 BRPM | SYSTOLIC BLOOD PRESSURE: 131 MMHG | TEMPERATURE: 98 F

## 2020-09-29 DIAGNOSIS — H92.03 EARACHE SYMPTOMS, BILATERAL: Primary | ICD-10-CM

## 2020-09-29 DIAGNOSIS — H61.23 BILATERAL IMPACTED CERUMEN: ICD-10-CM

## 2020-09-29 PROCEDURE — S9083 URGENT CARE CENTER GLOBAL: HCPCS | Performed by: NURSE PRACTITIONER

## 2020-09-29 PROCEDURE — 69209 REMOVE IMPACTED EAR WAX UNI: CPT | Performed by: NURSE PRACTITIONER

## 2020-09-29 PROCEDURE — G0382 LEV 3 HOSP TYPE B ED VISIT: HCPCS | Performed by: NURSE PRACTITIONER

## 2020-09-29 RX ORDER — AZITHROMYCIN 250 MG/1
TABLET, FILM COATED ORAL
Qty: 6 TABLET | Refills: 0 | Status: SHIPPED | OUTPATIENT
Start: 2020-09-29 | End: 2020-10-03

## 2020-09-29 NOTE — PROGRESS NOTES
[facility]        NAME: Devi Garza is a 47 y o  female  : 1980    MRN: [id number]  DATE: 2020  TIME: 6:21 PM    Assessment and Plan   Earache symptoms, bilateral [H92 03]  1  Earache symptoms, bilateral  azithromycin (ZITHROMAX) 250 mg tablet   2  Bilateral impacted cerumen  Ear cerumen removal         Patient Instructions     Take antibiotics and tylenol  If pain is due to infection, it will help  Otherwise use debrox and RTC for re-irrigation  Follow up with PCP in 3-5 days  Proceed to  ER if symptoms worsen  Chief Complaint     Chief Complaint   Patient presents with   Alley Boning     left x am         History of Present Illness       HPI   Reports ear pain in the left ear, moderate, 5/10, started this morning  No trauma  No drainage  No hearing loss  Review of Systems   Review of Systems   Constitutional: Negative for fever  HENT: Positive for ear pain  Negative for dental problem, ear discharge, facial swelling and hearing loss  Respiratory: Negative for cough and shortness of breath  Neurological: Negative for headaches  Current Medications       Current Outpatient Medications:     azithromycin (ZITHROMAX) 250 mg tablet, Take 2 tablets today then 1 tablet daily x 4 days, Disp: 6 tablet, Rfl: 0    Cholecalciferol (VITAMIN D) 2000 units CAPS, Take 3,000 Units by mouth  , Disp: , Rfl:     minocycline (MINOCIN) 100 mg capsule, Take 100 mg by mouth 2 (two) times a day, Disp: , Rfl:     Polyethylene Glycol 3350 (MIRALAX PO), Take 1 packet by mouth daily as needed  , Disp: , Rfl:     Prenatal Multivit-Min-Fe-FA (PRENATAL VITAMINS) 0 8 MG tablet, Take 1 tablet by mouth daily, Disp: , Rfl:     sertraline (ZOLOFT) 50 mg tablet, TAKE 1 TABLET BY MOUTH EVERY DAY, Disp: 30 tablet, Rfl: 1    tobramycin (TOBREX) 0 3 % SOLN, , Disp: , Rfl:     Current Allergies     Allergies as of 2020 - Reviewed 2020   Allergen Reaction Noted    Seasonal ic [cholestatin] Allergic Rhinitis 06/20/2014    Amoxicillin Hives 02/15/2016    Augmentin [amoxicillin-pot clavulanate] Hives 02/15/2016    Fluoxetine Other (See Comments) 05/08/2014    Penicillins Rash 06/03/2014    Wound dressings Rash 02/08/2014            The following portions of the patient's history were reviewed and updated as appropriate: allergies, current medications, past family history, past medical history, past social history, past surgical history and problem list      Past Medical History:   Diagnosis Date    Anxiety     last assessed - [id number]    Hyperlipidemia     last assessed - [id number]    IBS (irritable bowel syndrome)     Low grade mucinous neoplasm of appendix     last assessed - [id number]    Nausea     Normal delivery     2014 daughter   Malorie Nine Palpitations     last assessed - [id number]    Right bundle branch block     last assessed - [id number]    Thyroid cyst     last assessed - [id number]    Vacuum extractor delivery, delivered     2012 daughter    Varicella     childhood    Vitamin D deficiency        Past Surgical History:   Procedure Laterality Date    APPENDECTOMY      COLPOSCOPY W/ BIOPSY / CURETTAGE      Colposcopy Cervix with Biopsy(s)    LACRIMAL DUCT PROBING      surgery of the Lacrimal system    NC COLONOSCOPY FLX DX W/COLLJ SPEC WHEN PFRMD N/A 2/15/2016    Procedure: EGD AND COLONOSCOPY;  Surgeon: Nita Hobbs DO;  Location: [facility];   Service: General    SUBTOTAL COLECTOMY      Partial colectomy - with resection of appendix    TEAR DUCT SURGERY      TOOTH EXTRACTION      last assessed - [id number]    TUMOR REMOVAL  12/2015    removed from appendix       Family History   Problem Relation Age of Onset    Heart murmur Mother         type unspecified     Cancer Mother         thyroid    Hyperlipidemia Father     Hypertension Father     Arthritis Maternal Grandmother     Coronary artery disease Maternal Grandmother     Transient ischemic attack Maternal Grandmother     Cancer Maternal Grandmother         thyroid    Hypertension Maternal Grandmother     Stroke Maternal Grandmother     Depression Paternal Grandmother     Cancer Maternal Aunt         lung    Lake syndrome Paternal Aunt     Cancer Paternal Aunt         lymphoma    Depression Sister     Cancer Paternal Grandfather         bowel    Heart disease Paternal Grandfather     Hyperlipidemia Paternal Grandfather     Hypertension Paternal Grandfather     Heart defect Maternal Aunt          Medications have been verified  Objective   /72   Pulse 59   Temp 98 °F (36 7 °C)   Resp 18   LMP 09/22/2020   SpO2 100%        Physical Exam     Physical Exam  Constitutional:       Appearance: She is not ill-appearing  HENT:      Right Ear: There is impacted cerumen  Left Ear: There is impacted cerumen  Ears:      Comments: TMs not visible  Neurological:      Mental Status: She is alert  Ear cerumen removal    Date/Time: 9/29/2020 6:16 PM  Performed by: LACI Pacheco  Authorized by: LACI Pacheco     Patient location:  Clinic  Consent:     Consent obtained:  Verbal    Consent given by:  Patient    Risks discussed:  Bleeding, infection, pain, TM perforation, incomplete removal and dizziness    Alternatives discussed:  No treatment and referral  Universal protocol:     Patient identity confirmed:  Verbally with patient  Procedure details:     Local anesthetic:  None    Location:  L ear    Procedure type: irrigation only    Post-procedure details:     Complication:  None    Hearing quality:  Normal    Patient tolerance of procedure: Tolerated well, no immediate complications  Comments:      Very small amount of cerumen removed from left ear  Procedure stopped  Patient will use debrox and then RTC for re-irrigating both ears

## 2020-09-29 NOTE — PATIENT INSTRUCTIONS
Earache   WHAT YOU NEED TO KNOW:   An earache can be caused by a problem within your ear or from another body area  Common causes include earwax buildup, objects in your ear, injury, infections, or jaw or dental problems  Less often, earaches may be caused by arthritis in your upper spine  DISCHARGE INSTRUCTIONS:   Return to the emergency department if:   · You have a severe earache  · You have ear pain with itching, hearing loss, dizziness, a feeling of fullness in your ear, or ringing in your ears  Contact your healthcare provider if:   · Your ear pain worsens or does not go away with treatment  · You have drainage from your ear  · You have a fever  · Your outer ear becomes red, swollen, and warm  · You have questions or concerns about your condition or care  Medicines: You may need any of the following:  · NSAIDs , such as ibuprofen, help decrease swelling, pain, and fever  This medicine is available with or without a doctor's order  NSAIDs can cause stomach bleeding or kidney problems in certain people  If you take blood thinner medicine, always ask if NSAIDs are safe for you  Always read the medicine label and follow directions  Do not give these medicines to children under 10months of age without direction from your child's healthcare provider  · Acetaminophen  decreases pain and fever  It is available without a doctor's order  Ask how much to take and how often to take it  Follow directions  Acetaminophen can cause liver damage if not taken correctly  · Do not give aspirin to children under 25years of age  Your child could develop Reye syndrome if he takes aspirin  Reye syndrome can cause life-threatening brain and liver damage  Check your child's medicine labels for aspirin, salicylates, or oil of wintergreen  · Take your medicine as directed  Call your healthcare provider if you think your medicine is not helping or if you have side effects   Tell him if you are allergic to any medicine  Keep a list of the medicines, vitamins, and herbs you take  Include the amounts, and when and why you take them  Bring the list or the pill bottles to follow-up visits  Carry your medicine list with you in case of an emergency  Follow up with your healthcare provider as directed:  Write down your questions so you remember to ask them during your visits  © 2017 [address] [employer] Information is for End User's use only and may not be sold, redistributed or otherwise used for commercial purposes  All illustrations and images included in CareNotes® are the copyrighted property of [employer] , [employer]  or [employer]  The above information is an  only  It is not intended as medical advice for individual conditions or treatments  Talk to your doctor, nurse or pharmacist before following any medical regimen to see if it is safe and effective for you

## 2020-10-01 ENCOUNTER — IMMUNIZATIONS (OUTPATIENT)
Dept: INTERNAL MEDICINE CLINIC | Facility: CLINIC | Age: 40
End: 2020-10-01
Payer: COMMERCIAL

## 2020-10-01 DIAGNOSIS — Z23 ENCOUNTER FOR IMMUNIZATION: ICD-10-CM

## 2020-10-01 PROCEDURE — 90686 IIV4 VACC NO PRSV 0.5 ML IM: CPT

## 2020-10-01 PROCEDURE — 90471 IMMUNIZATION ADMIN: CPT

## 2020-10-21 DIAGNOSIS — F41.9 ANXIETY: ICD-10-CM

## 2020-10-30 ENCOUNTER — LAB (OUTPATIENT)
Dept: LAB | Facility: HOSPITAL | Age: 40
End: 2020-10-30
Attending: SURGERY
Payer: COMMERCIAL

## 2020-10-30 DIAGNOSIS — E55.9 VITAMIN D DEFICIENCY: ICD-10-CM

## 2020-10-30 DIAGNOSIS — D37.3 LOW GRADE MUCINOUS NEOPLASM OF APPENDIX: ICD-10-CM

## 2020-10-30 DIAGNOSIS — E78.5 HYPERLIPIDEMIA, UNSPECIFIED HYPERLIPIDEMIA TYPE: ICD-10-CM

## 2020-10-30 LAB
25(OH)D3 SERPL-MCNC: 37.2 NG/ML (ref 30–100)
ALBUMIN SERPL BCP-MCNC: 4.2 G/DL (ref 3.5–5)
ALP SERPL-CCNC: 80 U/L (ref 46–116)
ALT SERPL W P-5'-P-CCNC: 28 U/L (ref 12–78)
ANION GAP SERPL CALCULATED.3IONS-SCNC: 5 MMOL/L (ref 4–13)
AST SERPL W P-5'-P-CCNC: 19 U/L (ref 5–45)
BILIRUB SERPL-MCNC: 0.79 MG/DL (ref 0.2–1)
BUN SERPL-MCNC: 9 MG/DL (ref 5–25)
CALCIUM SERPL-MCNC: 8.6 MG/DL (ref 8.3–10.1)
CEA SERPL-MCNC: <0.5 NG/ML (ref 0–3)
CHLORIDE SERPL-SCNC: 107 MMOL/L (ref 100–108)
CHOLEST SERPL-MCNC: 205 MG/DL (ref 50–200)
CO2 SERPL-SCNC: 27 MMOL/L (ref 21–32)
CREAT SERPL-MCNC: 0.74 MG/DL (ref 0.6–1.3)
GFR SERPL CREATININE-BSD FRML MDRD: 103 ML/MIN/1.73SQ M
GLUCOSE P FAST SERPL-MCNC: 90 MG/DL (ref 65–99)
HDLC SERPL-MCNC: 55 MG/DL
LDLC SERPL CALC-MCNC: 135 MG/DL (ref 0–100)
POTASSIUM SERPL-SCNC: 3.8 MMOL/L (ref 3.5–5.3)
PROT SERPL-MCNC: 7.1 G/DL (ref 6.4–8.2)
SODIUM SERPL-SCNC: 139 MMOL/L (ref 136–145)
TRIGL SERPL-MCNC: 74 MG/DL

## 2020-10-30 PROCEDURE — 80061 LIPID PANEL: CPT

## 2020-10-30 PROCEDURE — 82378 CARCINOEMBRYONIC ANTIGEN: CPT

## 2020-10-30 PROCEDURE — 82306 VITAMIN D 25 HYDROXY: CPT

## 2020-10-30 PROCEDURE — 36415 COLL VENOUS BLD VENIPUNCTURE: CPT

## 2020-10-30 PROCEDURE — 80053 COMPREHEN METABOLIC PANEL: CPT

## 2020-11-04 ENCOUNTER — OFFICE VISIT (OUTPATIENT)
Dept: INTERNAL MEDICINE CLINIC | Facility: CLINIC | Age: 40
End: 2020-11-04
Payer: COMMERCIAL

## 2020-11-04 VITALS
OXYGEN SATURATION: 99 % | RESPIRATION RATE: 16 BRPM | SYSTOLIC BLOOD PRESSURE: 119 MMHG | BODY MASS INDEX: 31.13 KG/M2 | HEIGHT: 68 IN | DIASTOLIC BLOOD PRESSURE: 68 MMHG | WEIGHT: 205.4 LBS | HEART RATE: 61 BPM | TEMPERATURE: 98 F

## 2020-11-04 DIAGNOSIS — F41.9 ANXIETY: Primary | ICD-10-CM

## 2020-11-04 DIAGNOSIS — E66.09 CLASS 1 OBESITY DUE TO EXCESS CALORIES WITHOUT SERIOUS COMORBIDITY WITH BODY MASS INDEX (BMI) OF 30.0 TO 30.9 IN ADULT: ICD-10-CM

## 2020-11-04 DIAGNOSIS — E78.5 HYPERLIPIDEMIA, UNSPECIFIED HYPERLIPIDEMIA TYPE: ICD-10-CM

## 2020-11-04 PROCEDURE — 99214 OFFICE O/P EST MOD 30 MIN: CPT | Performed by: INTERNAL MEDICINE

## 2020-11-06 ENCOUNTER — HOSPITAL ENCOUNTER (OUTPATIENT)
Dept: CT IMAGING | Facility: HOSPITAL | Age: 40
Discharge: HOME | End: 2020-11-06
Attending: SURGERY
Payer: COMMERCIAL

## 2020-11-06 DIAGNOSIS — D37.3 LOW GRADE MUCINOUS NEOPLASM OF APPENDIX: ICD-10-CM

## 2020-11-06 PROCEDURE — 71260 CT THORAX DX C+: CPT

## 2020-11-06 PROCEDURE — 74177 CT ABD & PELVIS W/CONTRAST: CPT

## 2020-11-06 RX ADMIN — IOHEXOL 100 ML: 350 INJECTION, SOLUTION INTRAVENOUS at 10:15

## 2020-11-23 ENCOUNTER — TELEPHONE (OUTPATIENT)
Dept: SURGICAL ONCOLOGY | Facility: CLINIC | Age: 40
End: 2020-11-23

## 2020-11-25 ENCOUNTER — OFFICE VISIT (OUTPATIENT)
Dept: SURGICAL ONCOLOGY | Facility: CLINIC | Age: 40
End: 2020-11-25
Payer: COMMERCIAL

## 2020-11-25 VITALS
DIASTOLIC BLOOD PRESSURE: 78 MMHG | TEMPERATURE: 97.8 F | SYSTOLIC BLOOD PRESSURE: 102 MMHG | BODY MASS INDEX: 31.22 KG/M2 | HEIGHT: 68 IN | WEIGHT: 206 LBS | HEART RATE: 72 BPM | RESPIRATION RATE: 16 BRPM

## 2020-11-25 DIAGNOSIS — Z08 ENCOUNTER FOR FOLLOW-UP EXAMINATION AFTER COMPLETED TREATMENT FOR MALIGNANT NEOPLASM: Primary | ICD-10-CM

## 2020-11-25 DIAGNOSIS — Z85.09 HISTORY OF MALIGNANT NEOPLASM OF APPENDIX: ICD-10-CM

## 2020-11-25 PROCEDURE — 99213 OFFICE O/P EST LOW 20 MIN: CPT | Performed by: NURSE PRACTITIONER

## 2020-11-25 PROCEDURE — 3008F BODY MASS INDEX DOCD: CPT | Performed by: NURSE PRACTITIONER

## 2020-11-25 PROCEDURE — 1036F TOBACCO NON-USER: CPT | Performed by: NURSE PRACTITIONER

## 2021-02-22 DIAGNOSIS — Z23 ENCOUNTER FOR IMMUNIZATION: ICD-10-CM

## 2021-02-25 ENCOUNTER — IMMUNIZATIONS (OUTPATIENT)
Dept: FAMILY MEDICINE CLINIC | Facility: HOSPITAL | Age: 41
End: 2021-02-25

## 2021-02-25 DIAGNOSIS — Z23 ENCOUNTER FOR IMMUNIZATION: Primary | ICD-10-CM

## 2021-02-25 PROCEDURE — 91300 SARS-COV-2 / COVID-19 MRNA VACCINE (PFIZER-BIONTECH) 30 MCG: CPT

## 2021-02-25 PROCEDURE — 0001A SARS-COV-2 / COVID-19 MRNA VACCINE (PFIZER-BIONTECH) 30 MCG: CPT

## 2021-03-11 ENCOUNTER — OFFICE VISIT (OUTPATIENT)
Dept: GASTROENTEROLOGY | Facility: CLINIC | Age: 41
End: 2021-03-11
Payer: COMMERCIAL

## 2021-03-11 VITALS
SYSTOLIC BLOOD PRESSURE: 104 MMHG | HEIGHT: 68 IN | TEMPERATURE: 98.4 F | BODY MASS INDEX: 32.28 KG/M2 | WEIGHT: 213 LBS | DIASTOLIC BLOOD PRESSURE: 74 MMHG

## 2021-03-11 DIAGNOSIS — K21.9 GERD WITHOUT ESOPHAGITIS: ICD-10-CM

## 2021-03-11 DIAGNOSIS — K63.5 BENIGN COLON POLYP: Primary | ICD-10-CM

## 2021-03-11 DIAGNOSIS — K58.8 OTHER IRRITABLE BOWEL SYNDROME: ICD-10-CM

## 2021-03-11 DIAGNOSIS — C18.1 MALIGNANT TUMOR OF APPENDIX (HCC): ICD-10-CM

## 2021-03-11 DIAGNOSIS — E66.09 CLASS 1 OBESITY DUE TO EXCESS CALORIES WITHOUT SERIOUS COMORBIDITY WITH BODY MASS INDEX (BMI) OF 30.0 TO 30.9 IN ADULT: ICD-10-CM

## 2021-03-11 DIAGNOSIS — K59.09 CHRONIC CONSTIPATION: ICD-10-CM

## 2021-03-11 PROCEDURE — 3008F BODY MASS INDEX DOCD: CPT | Performed by: INTERNAL MEDICINE

## 2021-03-11 PROCEDURE — 99244 OFF/OP CNSLTJ NEW/EST MOD 40: CPT | Performed by: INTERNAL MEDICINE

## 2021-03-11 PROCEDURE — 1036F TOBACCO NON-USER: CPT | Performed by: INTERNAL MEDICINE

## 2021-03-11 RX ORDER — SODIUM, POTASSIUM,MAG SULFATES 17.5-3.13G
1 SOLUTION, RECONSTITUTED, ORAL ORAL ONCE
Qty: 1 BOTTLE | Refills: 0 | Status: SHIPPED | OUTPATIENT
Start: 2021-03-11 | End: 2021-05-06 | Stop reason: ALTCHOICE

## 2021-03-11 NOTE — PROGRESS NOTES
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  1  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([address] )  -repeat colonoscopy now and every five years as she ages   -patient agreeable to procedure, risk and benefits discussed    Follow-up as needed in the office      ______________________________________________________________________    HPI:  47year old female re-referred for colonoscopy  She had a malignant tumor removed from her appendix  She had a repeat colonoscopy shortly after this which was normal   She is now 5 years out and is here for colonoscopy  She denies any current symptoms  She went on to have another child  She is done having children at this time  She is excited as she is planning a trip to [facility]  REVIEW OF SYSTEMS:    CONSTITUTIONAL: Denies any fever, chills, rigors, and weight loss  HEENT: No earache or tinnitus  Denies hearing loss or visual disturbances  CARDIOVASCULAR: No chest pain or palpitations  RESPIRATORY: Denies any cough, hemoptysis, shortness of breath or dyspnea on exertion  GASTROINTESTINAL: As noted in the History of Present Illness  GENITOURINARY: No problems with urination  Denies any hematuria or dysuria  NEUROLOGIC: No dizziness or vertigo, denies headaches  MUSCULOSKELETAL: Denies any muscle or joint pain  SKIN: Denies skin rashes or itching  ENDOCRINE: Denies excessive thirst  Denies intolerance to heat or cold  PSYCHOSOCIAL: Denies depression or anxiety  Denies any recent memory loss         Historical Information   Past Medical History:   Diagnosis Date    Anxiety     last assessed - [id number]    Hyperlipidemia     last assessed - [id number]    IBS (irritable bowel syndrome)     Low grade mucinous neoplasm of appendix     last assessed - [id number]    Nausea     Normal delivery     2014 daughter   Lucía Sang Palpitations     last assessed - [id number]    Right bundle branch block     last assessed - [id number]    Thyroid cyst     last assessed - [id number]    Vacuum extractor delivery, delivered     2012 daughter    Varicella     childhood    Vitamin D deficiency      Past Surgical History:   Procedure Laterality Date    APPENDECTOMY      COLPOSCOPY W/ BIOPSY / CURETTAGE      Colposcopy Cervix with Biopsy(s)    LACRIMAL DUCT PROBING      surgery of the Lacrimal system    IN COLONOSCOPY FLX DX W/COLLJ SPEC WHEN PFRMD N/A 2/15/2016    Procedure: EGD AND COLONOSCOPY;  Surgeon: Omar Hernandez DO;  Location:  GI LAB;   Service: General    SUBTOTAL COLECTOMY      Partial colectomy - with resection of appendix    TEAR DUCT SURGERY      TOOTH EXTRACTION      last assessed - [id number]    TUMOR REMOVAL  12/2015    removed from appendix     Social History   Social History     Substance and Sexual Activity   Alcohol Use Yes    Comment: rarely; drinks beer; social alcohol use     Social History     Substance and Sexual Activity   Drug Use No     Social History     Tobacco Use   Smoking Status Never Smoker   Smokeless Tobacco Never Used     Family History   Problem Relation Age of Onset    Heart murmur Mother         type unspecified     Cancer Mother         thyroid    Hyperlipidemia Father     Hypertension Father     Arthritis Maternal Grandmother     Coronary artery disease Maternal Grandmother     Transient ischemic attack Maternal Grandmother     Cancer Maternal Grandmother         thyroid    Hypertension Maternal Grandmother     Stroke Maternal Grandmother     Depression Paternal Grandmother     Cancer Maternal Aunt         lung    Lake syndrome Paternal Aunt     Cancer Paternal Aunt         lymphoma    Depression Sister     Cancer Paternal Grandfather         bowel    Heart disease Paternal Grandfather     Hyperlipidemia Paternal Grandfather     Hypertension Paternal Grandfather     Heart defect Maternal Aunt        Meds/Allergies       Current Outpatient Medications:     Cholecalciferol (VITAMIN D) 2000 units CAPS    Multiple Vitamins-Minerals (MULTIVITAMIN GUMMIES ADULT PO)    Polyethylene Glycol 3350 (MIRALAX PO)    Prenatal Multivit-Min-Fe-FA (PRENATAL VITAMINS) 0 8 MG tablet    sertraline (ZOLOFT) 50 mg tablet    Allergies   Allergen Reactions    Seasonal Ic [Cholestatin] Allergic Rhinitis    Amoxicillin Hives    Augmentin [Amoxicillin-Pot Clavulanate] Hives    Fluoxetine Other (See Comments)     Other reaction(s): bloating    Penicillins Rash    Wound Dressings Rash           Objective     currently breastfeeding  There is no height or weight on file to calculate BMI  PHYSICAL EXAM:      General Appearance:   Alert, cooperative, no distress   HEENT:   Normocephalic, atraumatic, anicteric      Neck:  Supple, symmetrical, trachea midline   Lungs:   Clear to auscultation bilaterally; no rales, rhonchi or wheezing; respirations unlabored    Heart[de-identified]   Regular rate and rhythm; no murmur, rub, or gallop  Abdomen:   Soft, non-tender, non-distended; normal bowel sounds; no masses, no organomegaly    Genitalia:   Deferred    Rectal:   Deferred    Extremities:  No cyanosis, clubbing or edema    Pulses:  2+ and symmetric    Skin:  No jaundice, rashes, or lesions    Lymph nodes:  No palpable cervical lymphadenopathy        Lab Results:   No visits with results within 1 Day(s) from this visit     Latest known visit with results is:   Lab on 10/30/2020   Component Date Value    CEA 10/30/2020 <0 5     Sodium 10/30/2020 139     Potassium 10/30/2020 3 8     Chloride 10/30/2020 107     CO2 10/30/2020 27     ANION GAP 10/30/2020 5     BUN 10/30/2020 9     Creatinine 10/30/2020 0 74     Glucose, Fasting 10/30/2020 90     Calcium 10/30/2020 8 6     AST 10/30/2020 19     ALT 10/30/2020 28     Alkaline Phosphatase 10/30/2020 80     Total Protein 10/30/2020 7 1     Albumin 10/30/2020 4 2     Total Bilirubin 10/30/2020 0 79     eGFR 10/30/2020 103     Cholesterol 10/30/2020 205*    Triglycerides 10/30/2020 74     HDL, Direct 10/30/2020 55     LDL Calculated 10/30/2020 135*    Vit D, 25-Hydroxy 10/30/2020 37 2      Radiology Results:   No results found

## 2021-03-11 NOTE — PATIENT INSTRUCTIONS
Colonoscopy scheduled 05/06/21 @[facility] with Dr Raza Pearson instructions given by Deanna in the office

## 2021-03-18 ENCOUNTER — IMMUNIZATIONS (OUTPATIENT)
Dept: FAMILY MEDICINE CLINIC | Facility: HOSPITAL | Age: 41
End: 2021-03-18

## 2021-03-18 DIAGNOSIS — Z23 ENCOUNTER FOR IMMUNIZATION: Primary | ICD-10-CM

## 2021-03-18 PROCEDURE — 0002A SARS-COV-2 / COVID-19 MRNA VACCINE (PFIZER-BIONTECH) 30 MCG: CPT

## 2021-03-18 PROCEDURE — 91300 SARS-COV-2 / COVID-19 MRNA VACCINE (PFIZER-BIONTECH) 30 MCG: CPT

## 2021-03-23 ENCOUNTER — VBI (OUTPATIENT)
Dept: ADMINISTRATIVE | Facility: OTHER | Age: 41
End: 2021-03-23

## 2021-04-22 ENCOUNTER — ANESTHESIA EVENT (OUTPATIENT)
Dept: GASTROENTEROLOGY | Facility: AMBULARY SURGERY CENTER | Age: 41
End: 2021-04-22

## 2021-05-04 ENCOUNTER — ANNUAL EXAM (OUTPATIENT)
Dept: OBGYN CLINIC | Facility: CLINIC | Age: 41
End: 2021-05-04
Payer: COMMERCIAL

## 2021-05-04 VITALS
SYSTOLIC BLOOD PRESSURE: 120 MMHG | BODY MASS INDEX: 32.37 KG/M2 | DIASTOLIC BLOOD PRESSURE: 74 MMHG | WEIGHT: 213.6 LBS | HEIGHT: 68 IN

## 2021-05-04 DIAGNOSIS — Z01.419 ENCNTR FOR GYN EXAM (GENERAL) (ROUTINE) W/O ABN FINDINGS: ICD-10-CM

## 2021-05-04 DIAGNOSIS — Z12.31 ENCOUNTER FOR SCREENING MAMMOGRAM FOR MALIGNANT NEOPLASM OF BREAST: ICD-10-CM

## 2021-05-04 DIAGNOSIS — Z12.31 ENCOUNTER FOR SCREENING MAMMOGRAM FOR HIGH-RISK PATIENT: ICD-10-CM

## 2021-05-04 PROBLEM — J01.00 ACUTE NON-RECURRENT MAXILLARY SINUSITIS: Status: RESOLVED | Noted: 2019-11-14 | Resolved: 2021-05-04

## 2021-05-04 PROBLEM — Z00.00 WELLNESS EXAMINATION: Status: RESOLVED | Noted: 2020-07-31 | Resolved: 2021-05-04

## 2021-05-04 PROBLEM — J01.01 ACUTE RECURRENT MAXILLARY SINUSITIS: Status: RESOLVED | Noted: 2019-11-13 | Resolved: 2021-05-04

## 2021-05-04 PROCEDURE — 87624 HPV HI-RISK TYP POOLED RSLT: CPT | Performed by: PHYSICIAN ASSISTANT

## 2021-05-04 PROCEDURE — S0612 ANNUAL GYNECOLOGICAL EXAMINA: HCPCS | Performed by: PHYSICIAN ASSISTANT

## 2021-05-04 PROCEDURE — G0145 SCR C/V CYTO,THINLAYER,RESCR: HCPCS | Performed by: PHYSICIAN ASSISTANT

## 2021-05-04 RX ORDER — MOMETASONE FUROATE 1 MG/G
CREAM TOPICAL
COMMUNITY
Start: 2021-03-04

## 2021-05-04 NOTE — PROGRESS NOTES
Clarissa Bull  1980      CC:  Yearly exam    S:  46 y o  female here for yearly exam  Her cycles are regular, not heavy or crampy  It is heavy for two days where she can saturate a pad in 2 hours  Sexual activity: She is sexually active without pain, bleeding or dryness  Contraception: She uses vasectomy for contraception  Last Pap 6/22/16 neg/neg  Last Mammo never  Colonoscopy 2016, scheduled in 2 days (hx appendiceal cancer)    We reviewed ASCCP guidelines for Pap testing today  Family hx of breast cancer: no  Family hx of ovarian cancer: no  Family hx of colon cancer: PGF      Current Outpatient Medications:     Cholecalciferol (VITAMIN D) 2000 units CAPS, Take 3,000 Units by mouth  , Disp: , Rfl:     mometasone (ELOCON) 0 1 % cream, APPLY TO SKIN TWICE DAILY FOR ONE WEEK, THEN AS NEEDED, Disp: , Rfl:     Multiple Vitamins-Minerals (MULTIVITAMIN GUMMIES ADULT PO), Take by mouth, Disp: , Rfl:     Polyethylene Glycol 3350 (MIRALAX PO), Take 1 packet by mouth daily as needed  , Disp: , Rfl:     sertraline (ZOLOFT) 50 mg tablet, Take 1 tablet (50 mg total) by mouth daily, Disp: 90 tablet, Rfl: 1    Na Sulfate-K Sulfate-Mg Sulf (Suprep Bowel Prep Kit) 17 5-3 13-1 6 GM/177ML SOLN, Take 1 Bottle by mouth once for 1 dose, Disp: 1 Bottle, Rfl: 0  Social History     Socioeconomic History    Marital status: /Civil Union     Spouse name: Not on file    Number of children: Not on file    Years of education: Not on file    Highest education level: Not on file   Occupational History    Not on file   Social Needs    Financial resource strain: Not on file    Food insecurity     Worry: Not on file     Inability: Not on file   [employer] needs     Medical: Not on file     Non-medical: Not on file   Tobacco Use    Smoking status: Never Smoker    Smokeless tobacco: Never Used   Substance and Sexual Activity    Alcohol use: Yes     Comment: rarely; drinks beer; social alcohol use    Drug use: No    Sexual activity: Yes     Partners: Male     Birth control/protection: Male Sterilization   Lifestyle    Physical activity     Days per week: Not on file     Minutes per session: Not on file    Stress: Not on file   Relationships    Social connections     Talks on phone: Not on file     Gets together: Not on file     Attends Denominational service: Not on file     Active member of club or organization: Not on file     Attends meetings of clubs or organizations: Not on file     Relationship status: Not on file    Intimate partner violence     Fear of current or ex partner: Not on file     Emotionally abused: Not on file     Physically abused: Not on file     Forced sexual activity: Not on file   Other Topics Concern    Not on file   Social History Narrative    Activities: Soccer    Always uses seat belt    Drinks coffee    Exercise: Running    Exercises moderately less than 3 times a week         Family History   Problem Relation Age of Onset    Heart murmur Mother         type unspecified     Cancer Mother         thyroid    Hyperlipidemia Father     Hypertension Father     Arthritis Maternal Grandmother     Coronary artery disease Maternal Grandmother     Transient ischemic attack Maternal Grandmother     Cancer Maternal Grandmother         thyroid    Hypertension Maternal Grandmother     Stroke Maternal Grandmother     Depression Paternal Grandmother     Cancer Maternal Aunt         lung    Lake syndrome Paternal Aunt     Cancer Paternal Aunt         lymphoma    Depression Sister     Cancer Paternal Grandfather         bowel    Heart disease Paternal Grandfather     Hyperlipidemia Paternal Grandfather     Hypertension Paternal Grandfather     Heart defect Maternal Aunt       Past Medical History:   Diagnosis Date    Anxiety     last assessed - 19Jan2018    Hyperlipidemia     last assessed - 12Dec2017    IBS (irritable bowel syndrome)     Low grade mucinous neoplasm of appendix     last assessed - [id number]    Nausea     Normal delivery     2014 daughter   [employer] Palpitations     last assessed - [id number]    Right bundle branch block     last assessed - [id number]    Thyroid cyst     last assessed - [id number]    Vacuum extractor delivery, delivered     2012 daughter    Varicella     childhood    Vitamin D deficiency         Review of Systems   Respiratory: Negative  Cardiovascular: Negative  Gastrointestinal: Negative for constipation and diarrhea  Genitourinary: Negative for difficulty urinating, pelvic pain, vaginal bleeding, vaginal discharge, itching or odor  O:  Blood pressure 120/74, height 5' 7 72" (1 72 m), weight 96 9 kg (213 lb 9 6 oz), last menstrual period 04/23/2021, currently breastfeeding  Patient appears well and is not in distress  Neck is supple without masses  Breasts are symmetrical without mass, tenderness, nipple discharge, skin changes or adenopathy  Abdomen is soft and nontender without masses  External genitals are normal without lesions or rashes  Urethral meatus and urethra are normal  Bladder is normal to palpation  Vagina is normal without discharge or bleeding  Cervix is normal without discharge or lesion  Uterus is normal, mobile, nontender without palpable mass  Adnexa are normal, nontender, without palpable mass  A:  Yearly exam      P:   Pap and HPV today        RTO one year for yearly exam or sooner as needed

## 2021-05-05 NOTE — ANESTHESIA PREPROCEDURE EVALUATION
Procedure:  COLONOSCOPY    Relevant Problems   CARDIO   (+) Hyperlipidemia   (+) PVC (premature ventricular contraction)      GI/HEPATIC   (+) GERD without esophagitis      NEURO/PSYCH   (+) Anxiety   (+) Encounter for follow-up examination after completed treatment for malignant neoplasm   (+) H/O right bundle branch block   (+) History of malignant neoplasm of appendix (s/p resection)      Other   (+) IBS (irritable bowel syndrome)   (+) Palpitations    holter 2018  1  Predominantly sinus rhythm, with an average heart rate of 63 beats per minute  2  580 premature atrial contractions  3  22 premature ventricular contractions  4  No significant pauses or advanced degree heart block          Anesthesia Plan  ASA Score- 2     Anesthesia Type- IV sedation with anesthesia with ASA Monitors  Additional Monitors:   Airway Plan:           Plan Factors-    Chart reviewed  Induction-     Postoperative Plan-     Informed Consent- Anesthetic plan and risks discussed with patient  I personally reviewed this patient with the CRNA  Discussed and agreed on the Anesthesia Plan with the CRNA  Cristal Robertson

## 2021-05-06 ENCOUNTER — ANESTHESIA (OUTPATIENT)
Dept: GASTROENTEROLOGY | Facility: AMBULARY SURGERY CENTER | Age: 41
End: 2021-05-06

## 2021-05-06 ENCOUNTER — RA CDI HCC (OUTPATIENT)
Dept: OTHER | Facility: HOSPITAL | Age: 41
End: 2021-05-06

## 2021-05-06 ENCOUNTER — HOSPITAL ENCOUNTER (OUTPATIENT)
Dept: GASTROENTEROLOGY | Facility: AMBULARY SURGERY CENTER | Age: 41
Setting detail: OUTPATIENT SURGERY
Discharge: HOME | End: 2021-05-06
Attending: INTERNAL MEDICINE | Admitting: INTERNAL MEDICINE
Payer: COMMERCIAL

## 2021-05-06 VITALS
HEART RATE: 65 BPM | SYSTOLIC BLOOD PRESSURE: 117 MMHG | OXYGEN SATURATION: 99 % | RESPIRATION RATE: 18 BRPM | DIASTOLIC BLOOD PRESSURE: 68 MMHG | TEMPERATURE: 97 F | HEIGHT: 68 IN | WEIGHT: 208 LBS | BODY MASS INDEX: 31.52 KG/M2

## 2021-05-06 DIAGNOSIS — C18.1 MALIGNANT TUMOR OF APPENDIX (HCC): ICD-10-CM

## 2021-05-06 LAB
EXT PREGNANCY TEST URINE: NEGATIVE
EXT. CONTROL: NORMAL
HPV HR 12 DNA CVX QL NAA+PROBE: NEGATIVE
HPV16 DNA CVX QL NAA+PROBE: NEGATIVE
HPV18 DNA CVX QL NAA+PROBE: NEGATIVE

## 2021-05-06 PROCEDURE — 88305 TISSUE EXAM BY PATHOLOGIST: CPT | Performed by: PATHOLOGY

## 2021-05-06 PROCEDURE — 81025 URINE PREGNANCY TEST: CPT | Performed by: INTERNAL MEDICINE

## 2021-05-06 PROCEDURE — 45385 COLONOSCOPY W/LESION REMOVAL: CPT | Performed by: INTERNAL MEDICINE

## 2021-05-06 RX ORDER — SODIUM CHLORIDE, SODIUM LACTATE, POTASSIUM CHLORIDE, CALCIUM CHLORIDE 600; 310; 30; 20 MG/100ML; MG/100ML; MG/100ML; MG/100ML
INJECTION, SOLUTION INTRAVENOUS CONTINUOUS PRN
Status: DISCONTINUED | OUTPATIENT
Start: 2021-05-06 | End: 2021-05-06

## 2021-05-06 RX ORDER — PROPOFOL 10 MG/ML
INJECTION, EMULSION INTRAVENOUS AS NEEDED
Status: DISCONTINUED | OUTPATIENT
Start: 2021-05-06 | End: 2021-05-06

## 2021-05-06 RX ORDER — LIDOCAINE HYDROCHLORIDE 10 MG/ML
INJECTION, SOLUTION EPIDURAL; INFILTRATION; INTRACAUDAL; PERINEURAL AS NEEDED
Status: DISCONTINUED | OUTPATIENT
Start: 2021-05-06 | End: 2021-05-06

## 2021-05-06 RX ADMIN — PROPOFOL 50 MG: 10 INJECTION, EMULSION INTRAVENOUS at 15:10

## 2021-05-06 RX ADMIN — PROPOFOL 50 MG: 10 INJECTION, EMULSION INTRAVENOUS at 15:02

## 2021-05-06 RX ADMIN — PROPOFOL 30 MG: 10 INJECTION, EMULSION INTRAVENOUS at 15:12

## 2021-05-06 RX ADMIN — PROPOFOL 50 MG: 10 INJECTION, EMULSION INTRAVENOUS at 14:59

## 2021-05-06 RX ADMIN — PROPOFOL 50 MG: 10 INJECTION, EMULSION INTRAVENOUS at 15:08

## 2021-05-06 RX ADMIN — SODIUM CHLORIDE, SODIUM LACTATE, POTASSIUM CHLORIDE, AND CALCIUM CHLORIDE: .6; .31; .03; .02 INJECTION, SOLUTION INTRAVENOUS at 14:51

## 2021-05-06 RX ADMIN — LIDOCAINE HYDROCHLORIDE 50 MG: 10 INJECTION, SOLUTION EPIDURAL; INFILTRATION; INTRACAUDAL at 14:51

## 2021-05-06 RX ADMIN — PROPOFOL 50 MG: 10 INJECTION, EMULSION INTRAVENOUS at 15:05

## 2021-05-06 RX ADMIN — PROPOFOL 50 MG: 10 INJECTION, EMULSION INTRAVENOUS at 14:56

## 2021-05-06 RX ADMIN — PROPOFOL 100 MG: 10 INJECTION, EMULSION INTRAVENOUS at 14:51

## 2021-05-06 NOTE — ANESTHESIA POSTPROCEDURE EVALUATION
Post-Op Assessment Note    CV Status:  Stable    Pain management: adequate     Mental Status:  Alert and awake   Hydration Status:  Euvolemic   PONV Controlled:  Controlled   Airway Patency:  Patent      Post Op Vitals Reviewed: Yes      Staff: CRNA         No complications documented      BP      Temp (!) 97 °F (36 1 °C) (05/06/21 1517)    Pulse 69 (05/06/21 1517)   Resp 18 (05/06/21 1517)    SpO2 100 % (05/06/21 1517)

## 2021-05-06 NOTE — H&P
History and Physical -  [facility]  Paulo Kussmaul 46 y o  female MRN: [id number]                  HPI: Paulo Kussmaul is a 46y o  year old female who presents for colonoscopy for history of appendiceal cancer for follow-up  REVIEW OF SYSTEMS: Per the HPI, and otherwise unremarkable  Historical Information   Past Medical History:   Diagnosis Date    Anxiety     last assessed - [id number]    Colon polyp     Hyperlipidemia     last assessed - [id number]    IBS (irritable bowel syndrome)     Low grade mucinous neoplasm of appendix     last assessed - [id number]    Nausea     Normal delivery     2014 daughter   Exelicia Wiconisco Palpitations     last assessed - [id number]    Right bundle branch block     last assessed - [id number]    Thyroid cyst     last assessed - [id number]    Vacuum extractor delivery, delivered     2012 daughter    Varicella     childhood    Vitamin D deficiency      Past Surgical History:   Procedure Laterality Date    APPENDECTOMY      COLPOSCOPY W/ BIOPSY / CURETTAGE      Colposcopy Cervix with Biopsy(s)    LACRIMAL DUCT PROBING      surgery of the Lacrimal system    SC COLONOSCOPY FLX DX W/COLLJ SPEC WHEN PFRMD N/A 2/15/2016    Procedure: EGD AND COLONOSCOPY;  Surgeon: Brittanie Salas DO;  Location: [facility];   Service: General    SUBTOTAL COLECTOMY      Partial colectomy - with resection of appendix    TEAR DUCT SURGERY      TOOTH EXTRACTION      last assessed - [id number]    TUMOR REMOVAL  12/2015    removed from appendix     Social History   Social History     Substance and Sexual Activity   Alcohol Use Yes    Frequency: Monthly or less    Drinks per session: 1 or 2     Social History     Substance and Sexual Activity   Drug Use No     Social History     Tobacco Use   Smoking Status Never Smoker   Smokeless Tobacco Never Used     Family History   Problem Relation Age of Onset    Heart murmur Mother         type unspecified     Cancer Mother         thyroid    Hyperlipidemia Father     Hypertension Father     Arthritis Maternal Grandmother     Coronary artery disease Maternal Grandmother     Transient ischemic attack Maternal Grandmother     Cancer Maternal Grandmother         thyroid    Hypertension Maternal Grandmother     Stroke Maternal Grandmother     Depression Paternal Grandmother     Cancer Maternal Aunt         lung    Lake syndrome Paternal Aunt     Cancer Paternal Aunt         lymphoma    Depression Sister     Cancer Paternal Grandfather         bowel    Heart disease Paternal Grandfather     Hyperlipidemia Paternal Grandfather     Hypertension Paternal Grandfather     Colon cancer Paternal Grandfather     Heart defect Maternal Aunt        Meds/Allergies       Current Outpatient Medications:     Cholecalciferol (VITAMIN D) 2000 units CAPS    mometasone (ELOCON) 0 1 % cream    Multiple Vitamins-Minerals (MULTIVITAMIN GUMMIES ADULT PO)    sertraline (ZOLOFT) 50 mg tablet    Polyethylene Glycol 3350 (MIRALAX PO)    Allergies   Allergen Reactions    Seasonal Ic [Cholestatin] Allergic Rhinitis    Amoxicillin Hives    Augmentin [Amoxicillin-Pot Clavulanate] Hives    Fluoxetine Other (See Comments)     Other reaction(s): bloating    Penicillins Rash    Wound Dressings Rash       Objective     /68   Pulse 70   Temp 98 1 °F (36 7 °C) (Temporal)   Resp 18   Ht 5' 8" (1 727 m)   Wt 94 3 kg (208 lb)   LMP 04/23/2021   SpO2 100%   BMI 31 63 kg/m²       PHYSICAL EXAM    Gen: NAD  Head: NCAT  CV: RRR  CHEST: Clear  ABD: soft, NT/ND  EXT: no edema      ASSESSMENT/PLAN:  This is a 46y o  year old female here for colonoscopy, and she is stable and optimized for her procedure

## 2021-05-06 NOTE — PROGRESS NOTES
[facility]  coding opportunities          Chart reviewed, no opportunity found: CHART REVIEWED, NO OPPORTUNITY FOUND              Patients insurance company: [employer] (Medicare Advantage and Commercial)

## 2021-05-10 LAB
LAB AP GYN PRIMARY INTERPRETATION: NORMAL
Lab: NORMAL

## 2021-05-12 ENCOUNTER — OFFICE VISIT (OUTPATIENT)
Dept: INTERNAL MEDICINE CLINIC | Facility: CLINIC | Age: 41
End: 2021-05-12
Payer: COMMERCIAL

## 2021-05-12 VITALS
RESPIRATION RATE: 16 BRPM | DIASTOLIC BLOOD PRESSURE: 64 MMHG | WEIGHT: 211 LBS | HEART RATE: 57 BPM | OXYGEN SATURATION: 99 % | SYSTOLIC BLOOD PRESSURE: 124 MMHG | BODY MASS INDEX: 31.98 KG/M2 | HEIGHT: 68 IN

## 2021-05-12 DIAGNOSIS — Z13.29 SCREENING FOR THYROID DISORDER: ICD-10-CM

## 2021-05-12 DIAGNOSIS — Z13.1 SCREENING FOR DIABETES MELLITUS: ICD-10-CM

## 2021-05-12 DIAGNOSIS — E66.09 CLASS 1 OBESITY DUE TO EXCESS CALORIES WITHOUT SERIOUS COMORBIDITY WITH BODY MASS INDEX (BMI) OF 30.0 TO 30.9 IN ADULT: ICD-10-CM

## 2021-05-12 DIAGNOSIS — Z13.6 SCREENING FOR CARDIOVASCULAR CONDITION: ICD-10-CM

## 2021-05-12 DIAGNOSIS — E78.5 HYPERLIPIDEMIA, UNSPECIFIED HYPERLIPIDEMIA TYPE: Primary | ICD-10-CM

## 2021-05-12 DIAGNOSIS — F41.9 ANXIETY: ICD-10-CM

## 2021-05-12 PROCEDURE — 1036F TOBACCO NON-USER: CPT | Performed by: INTERNAL MEDICINE

## 2021-05-12 PROCEDURE — 3008F BODY MASS INDEX DOCD: CPT | Performed by: INTERNAL MEDICINE

## 2021-05-12 PROCEDURE — 99214 OFFICE O/P EST MOD 30 MIN: CPT | Performed by: INTERNAL MEDICINE

## 2021-05-12 NOTE — PROGRESS NOTES
Assessment/Plan:    Anxiety    Clinically stable and doing well continue the current medical regiment will continue monitor  Continue Zoloft 50 mg once daily no SI   We do recommend routine exercise    Class 1 obesity due to excess calories without serious comorbidity with body mass index (BMI) of 30 0 to 30 9 in adult   Obesity -I have counseled patient following healthy and balanced diet, I would like the patient to lose weight, I would like the patient exercise routinely; we will continue monitor the patient's progress  Hyperlipidemia    Hyperlipidemia   Today we did counseled patient about low-cholesterol diet will continue monitor lipid profile optimize LDL under 100  Screening for cardiovascular condition   I have counselled the pt to follow a healthy and balanced diet ,and recommend routine exercise  Screening for diabetes mellitus   Check fasting blood and A1c    Screening for thyroid disorder   Weight gain will check 3rd generation TSH         Problem List Items Addressed This Visit        Other    Anxiety       Clinically stable and doing well continue the current medical regiment will continue monitor  Continue Zoloft 50 mg once daily no SI   We do recommend routine exercise         Hyperlipidemia - Primary       Hyperlipidemia   Today we did counseled patient about low-cholesterol diet will continue monitor lipid profile optimize LDL under 100  Relevant Orders    Comprehensive metabolic panel    Lipid Panel with Direct LDL reflex    Class 1 obesity due to excess calories without serious comorbidity with body mass index (BMI) of 30 0 to 30 9 in adult      Obesity -I have counseled patient following healthy and balanced diet, I would like the patient to lose weight, I would like the patient exercise routinely; we will continue monitor the patient's progress           Screening for diabetes mellitus      Check fasting blood and A1c         Relevant Orders    Hemoglobin A1C    Screening for cardiovascular condition      I have counselled the pt to follow a healthy and balanced diet ,and recommend routine exercise  Screening for thyroid disorder      Weight gain will check 3rd generation TSH         Relevant Orders    TSH, 3rd generation           RTO in 6 months call if any problems  Subjective:      Patient ID: Waqas Rodriguez is a 46 y o  female  HPI  46-year old female coming in for a follow up office visit regarding hyperlipidemia, obesity, anxiety, screening for cardiovascular disease weight  Gain; The patient reports me compliant taking medications without untoward side effects the  The patient is here to review his medical condition, update me on the medical condition and the patient reports me no hospitalizations and no ER visits  Reports me her diet could be better she has not been exercising as much reports me somee  the pt plans to transfer to different school BMI Counseling: Body mass index is 32 08 kg/m²  The BMI is above normal  Nutrition recommendations include decreasing portion sizes, encouraging healthy choices of fruits and vegetables and moderation in carbohydrate intake  Exercise recommendations include exercising 3-5 times per week  Pt stopped nursing June 2020  The following portions of the patient's history were reviewed and updated as appropriate: allergies, current medications, past family history, past medical history, past social history, past surgical history and problem list     Review of Systems   Constitutional: Negative for activity change, appetite change and unexpected weight change  Eyes: Negative for visual disturbance  Respiratory: Negative for cough and shortness of breath  Cardiovascular: Negative for chest pain  Gastrointestinal: Negative for abdominal pain, diarrhea, nausea and vomiting  Neurological: Negative for dizziness, light-headedness and headaches  Psychiatric/Behavioral: Negative for suicidal ideas   The patient is not nervous/anxious  Objective:    No follow-ups on file  No results found  Allergies   Allergen Reactions    Seasonal Ic [Cholestatin] Allergic Rhinitis    Amoxicillin Hives    Augmentin [Amoxicillin-Pot Clavulanate] Hives    Fluoxetine Other (See Comments)     Other reaction(s): bloating    Penicillins Rash    Wound Dressings Rash       Past Medical History:   Diagnosis Date    Anxiety     last assessed - [id number]    Colon polyp     Hyperlipidemia     last assessed - [id number]    IBS (irritable bowel syndrome)     Low grade mucinous neoplasm of appendix     last assessed - [id number]    Nausea     Normal delivery     2014 daughter   Lino Helton Palpitations     last assessed - [id number]    Right bundle branch block     last assessed - [id number]    Thyroid cyst     last assessed - [id number]    Vacuum extractor delivery, delivered     2012 daughter    Varicella     childhood    Vitamin D deficiency      Past Surgical History:   Procedure Laterality Date    APPENDECTOMY      COLPOSCOPY W/ BIOPSY / CURETTAGE      Colposcopy Cervix with Biopsy(s)    LACRIMAL DUCT PROBING      surgery of the Lacrimal system    IN COLONOSCOPY FLX DX W/COLLJ SPEC WHEN PFRMD N/A 2/15/2016    Procedure: EGD AND COLONOSCOPY;  Surgeon: Jahaira Healy DO;  Location: [facility]; Service: General    SUBTOTAL COLECTOMY      Partial colectomy - with resection of appendix    TEAR DUCT SURGERY      TOOTH EXTRACTION      last assessed - [id number]    TUMOR REMOVAL  12/2015    removed from appendix     Current Outpatient Medications on File Prior to Visit   Medication Sig Dispense Refill    Cholecalciferol (VITAMIN D) 2000 units CAPS Take 3,000 Units by mouth        mometasone (ELOCON) 0 1 % cream APPLY TO SKIN TWICE DAILY FOR ONE WEEK, THEN AS NEEDED      Multiple Vitamins-Minerals (MULTIVITAMIN GUMMIES ADULT PO) Take by mouth      Polyethylene Glycol 3350 (MIRALAX PO) Take 1 packet by mouth daily as needed        sertraline (ZOLOFT) 50 mg tablet Take 1 tablet (50 mg total) by mouth daily 90 tablet 1     No current facility-administered medications on file prior to visit        Family History   Problem Relation Age of Onset    Heart murmur Mother         type unspecified     Cancer Mother         thyroid    Hyperlipidemia Father     Hypertension Father     Arthritis Maternal Grandmother     Coronary artery disease Maternal Grandmother     Transient ischemic attack Maternal Grandmother     Cancer Maternal Grandmother         thyroid    Hypertension Maternal Grandmother     Stroke Maternal Grandmother     Depression Paternal Grandmother     Cancer Maternal Aunt         lung    Lake syndrome Paternal Aunt     Cancer Paternal Aunt         lymphoma    Depression Sister     Cancer Paternal Grandfather         bowel    Heart disease Paternal Grandfather     Hyperlipidemia Paternal Grandfather     Hypertension Paternal Grandfather     Colon cancer Paternal Grandfather     Heart defect Maternal Aunt      Social History     Socioeconomic History    Marital status: /Civil Union     Spouse name: Not on file    Number of children: Not on file    Years of education: Not on file    Highest education level: Not on file   Occupational History    Not on file   Social Needs    Financial resource strain: Not on file    Food insecurity     Worry: Not on file     Inability: Not on file   [employer] needs     Medical: Not on file     Non-medical: Not on file   Tobacco Use    Smoking status: Never Smoker    Smokeless tobacco: Never Used   Substance and Sexual Activity    Alcohol use: Yes     Frequency: Monthly or less     Drinks per session: 1 or 2    Drug use: No    Sexual activity: Yes     Partners: Male     Birth control/protection: Male Sterilization   Lifestyle    Physical activity     Days per week: Not on file     Minutes per session: Not on file    Stress: Not on file   Relationships    Social connections     Talks on phone: Not on file     Gets together: Not on file     Attends Yazidism service: Not on file     Active member of club or organization: Not on file     Attends meetings of clubs or organizations: Not on file     Relationship status: Not on file    Intimate partner violence     Fear of current or ex partner: Not on file     Emotionally abused: Not on file     Physically abused: Not on file     Forced sexual activity: Not on file   Other Topics Concern    Not on file   Social History Narrative    Activities: Soccer    Always uses seat belt    Drinks coffee    Exercise: Running    Exercises moderately less than 3 times a week         Vitals:    05/12/21 1627   BP: 124/64   Pulse: 57   Resp: 16   SpO2: 99%   Weight: 95 7 kg (211 lb)   Height: 5' 8" (1 727 m)     Results for orders placed or performed during the hospital encounter of 05/06/21   POCT pregnancy, urine   Result Value Ref Range    EXT Preg Test, Ur Negative Negative    Control Valid Valid   Tissue Exam   Result Value Ref Range    Case Report       Surgical Pathology Report                         Case: [id number]                                   Authorizing Provider:  Ferdinand Downs DO        Collected:           05/06/2021 1508              Ordering Location:     [facility]        Received:            05/06/2021 [address] [facility]                                                           Pathologist:           Kenton Jameson MD                                                              Specimen:    Large Intestine, Sigmoid Colon, Cold Snare Sigmoid Polypectomy                             Final Diagnosis       A  Large Intestine, Sigmoid Colon, Cold Snare Sigmoid Polypectomy:  -Hyperplastic polyp   -No evidence of adenomatous change or malignancy             Note       Interpretation performed at [address] [place] [address], [facility], [address] [place] [id number] Additional Information       All reported additional testing was performed with appropriately reactive controls  These tests were developed and their performance characteristics determined by [facility] or appropriate performing facility, though some tests may be performed on tissues which have not been validated for performance characteristics (such as staining performed on alcohol exposed cell blocks and decalcified tissues)  Results should be interpreted with caution and in the context of the patients clinical condition  These tests may not be cleared or approved by the [place]  Food and Drug Administration, though the FDA has determined that such clearance or approval is not necessary  These tests are used for clinical purposes and they should not be regarded as investigational or for research  This laboratory has been approved by [place] [id number], designated as a high-complexity laboratory and is qualified to perform these tests  Ruben Grubbs Description          A  The specimen is received in formalin, labeled with the patient's name and hospital number, and is designated "sigmoid colon polyp  The specimen consists of a single tan-red, rubbery, irregularly shaped polypoid portion of tissue measuring 1 1 cm in greatest dimension  The specimen is entirely submitted in a screened cassette  Note: The estimated total formalin fixation time based upon information provided by the submitting clinician and the standard processing schedule is under 72 hours  AGerczyk           Weight (last 2 days)     None        Body mass index is 32 08 kg/m²    BP      Temp      Pulse     Resp      SpO2        Vitals:    05/12/21 1627   Weight: 95 7 kg (211 lb)     Vitals:    05/12/21 1627   Weight: 95 7 kg (211 lb)       /64   Pulse 57   Resp 16   Ht 5' 8" (1 727 m)   Wt 95 7 kg (211 lb)   LMP 04/23/2021   SpO2 99%   BMI 32 08 kg/m²          Physical Exam  Constitutional:       Appearance: She is well-developed  HENT:      Head: Normocephalic  Eyes:      General: No scleral icterus  Right eye: No discharge  Left eye: No discharge  Conjunctiva/sclera: Conjunctivae normal       Pupils: Pupils are equal, round, and reactive to light  Neck:      Musculoskeletal: Neck supple  Cardiovascular:      Rate and Rhythm: Normal rate and regular rhythm  Heart sounds: Normal heart sounds  No murmur  No friction rub  No gallop  Pulmonary:      Effort: No respiratory distress  Breath sounds: Normal breath sounds  No wheezing or rales  Abdominal:      General: Bowel sounds are normal  There is no distension  Palpations: Abdomen is soft  There is no mass  Tenderness: There is no abdominal tenderness  There is no guarding or rebound  Musculoskeletal:         General: No deformity  Lymphadenopathy:      Cervical: No cervical adenopathy  Neurological:      Mental Status: She is alert  Coordination: Coordination normal    Psychiatric:         Mood and Affect: Mood is anxious  Mood is not depressed  Thought Content: Thought content does not include suicidal ideation

## 2021-05-16 PROBLEM — Z13.6 SCREENING FOR CARDIOVASCULAR CONDITION: Status: ACTIVE | Noted: 2021-05-16

## 2021-05-16 PROBLEM — Z13.1 SCREENING FOR DIABETES MELLITUS: Status: ACTIVE | Noted: 2021-05-16

## 2021-05-16 PROBLEM — Z13.29 SCREENING FOR THYROID DISORDER: Status: ACTIVE | Noted: 2021-05-16

## 2021-05-16 NOTE — ASSESSMENT & PLAN NOTE
Hyperlipidemia   Today we did counseled patient about low-cholesterol diet will continue monitor lipid profile optimize LDL under 100

## 2021-05-16 NOTE — ASSESSMENT & PLAN NOTE
Clinically stable and doing well continue the current medical regiment will continue monitor    Continue Zoloft 50 mg once daily no SI   We do recommend routine exercise

## 2021-07-21 DIAGNOSIS — F41.9 ANXIETY: ICD-10-CM

## 2021-08-26 ENCOUNTER — TELEPHONE (OUTPATIENT)
Dept: SURGICAL ONCOLOGY | Facility: CLINIC | Age: 41
End: 2021-08-26

## 2021-08-26 ENCOUNTER — TELEPHONE (OUTPATIENT)
Dept: HEMATOLOGY ONCOLOGY | Facility: CLINIC | Age: 41
End: 2021-08-26

## 2021-08-26 NOTE — TELEPHONE ENCOUNTER
Reschedule Appointment     Who is calling in  Patient    Doctor Appointment Scheduled with Alley Hernandez date and time 12-24-21 @ 8:00am    New date and time 12-28-21 @ 10:00am    Location [facility]   Patient verbalized understanding

## 2021-10-02 ENCOUNTER — IMMUNIZATIONS (OUTPATIENT)
Dept: INTERNAL MEDICINE CLINIC | Facility: CLINIC | Age: 41
End: 2021-10-02
Payer: COMMERCIAL

## 2021-10-02 DIAGNOSIS — Z23 ENCOUNTER FOR IMMUNIZATION: Primary | ICD-10-CM

## 2021-10-02 PROCEDURE — 90686 IIV4 VACC NO PRSV 0.5 ML IM: CPT

## 2021-10-02 PROCEDURE — 90471 IMMUNIZATION ADMIN: CPT

## 2021-11-12 ENCOUNTER — RA CDI HCC (OUTPATIENT)
Dept: OTHER | Facility: HOSPITAL | Age: 41
End: 2021-11-12

## 2021-11-19 ENCOUNTER — OFFICE VISIT (OUTPATIENT)
Dept: URGENT CARE | Age: 41
End: 2021-11-19
Payer: COMMERCIAL

## 2021-11-19 VITALS — TEMPERATURE: 97.9 F | HEART RATE: 102 BPM | RESPIRATION RATE: 20 BRPM | OXYGEN SATURATION: 98 %

## 2021-11-19 DIAGNOSIS — J01.90 ACUTE SINUSITIS, RECURRENCE NOT SPECIFIED, UNSPECIFIED LOCATION: Primary | ICD-10-CM

## 2021-11-19 PROCEDURE — G0382 LEV 3 HOSP TYPE B ED VISIT: HCPCS | Performed by: STUDENT IN AN ORGANIZED HEALTH CARE EDUCATION/TRAINING PROGRAM

## 2021-11-19 PROCEDURE — S9083 URGENT CARE CENTER GLOBAL: HCPCS | Performed by: STUDENT IN AN ORGANIZED HEALTH CARE EDUCATION/TRAINING PROGRAM

## 2021-11-19 RX ORDER — AZITHROMYCIN 250 MG/1
TABLET, FILM COATED ORAL
Qty: 6 TABLET | Refills: 0 | Status: SHIPPED | OUTPATIENT
Start: 2021-11-19 | End: 2021-11-23

## 2021-11-22 ENCOUNTER — OFFICE VISIT (OUTPATIENT)
Dept: INTERNAL MEDICINE CLINIC | Facility: CLINIC | Age: 41
End: 2021-11-22
Payer: COMMERCIAL

## 2021-11-22 VITALS
HEART RATE: 76 BPM | HEIGHT: 68 IN | WEIGHT: 217.8 LBS | DIASTOLIC BLOOD PRESSURE: 70 MMHG | RESPIRATION RATE: 16 BRPM | OXYGEN SATURATION: 98 % | BODY MASS INDEX: 33.01 KG/M2 | SYSTOLIC BLOOD PRESSURE: 122 MMHG

## 2021-11-22 DIAGNOSIS — Z00.00 ANNUAL PHYSICAL EXAM: Primary | ICD-10-CM

## 2021-11-22 DIAGNOSIS — F41.9 ANXIETY: ICD-10-CM

## 2021-11-22 DIAGNOSIS — E78.2 MODERATE MIXED HYPERLIPIDEMIA NOT REQUIRING STATIN THERAPY: ICD-10-CM

## 2021-11-22 PROCEDURE — 1036F TOBACCO NON-USER: CPT | Performed by: INTERNAL MEDICINE

## 2021-11-22 PROCEDURE — 3008F BODY MASS INDEX DOCD: CPT | Performed by: INTERNAL MEDICINE

## 2021-11-22 PROCEDURE — 99395 PREV VISIT EST AGE 18-39: CPT | Performed by: INTERNAL MEDICINE

## 2021-11-22 PROCEDURE — 3725F SCREEN DEPRESSION PERFORMED: CPT | Performed by: INTERNAL MEDICINE

## 2021-12-13 ENCOUNTER — IMMUNIZATIONS (OUTPATIENT)
Dept: FAMILY MEDICINE CLINIC | Facility: HOSPITAL | Age: 41
End: 2021-12-13

## 2021-12-13 DIAGNOSIS — Z23 ENCOUNTER FOR IMMUNIZATION: Primary | ICD-10-CM

## 2021-12-13 PROCEDURE — 0001A COVID-19 PFIZER VACC 0.3 ML: CPT

## 2021-12-13 PROCEDURE — 91300 COVID-19 PFIZER VACC 0.3 ML: CPT

## 2021-12-17 ENCOUNTER — APPOINTMENT (OUTPATIENT)
Dept: LAB | Facility: CLINIC | Age: 41
End: 2021-12-17
Payer: COMMERCIAL

## 2021-12-17 ENCOUNTER — HOSPITAL ENCOUNTER (OUTPATIENT)
Dept: CT IMAGING | Facility: HOSPITAL | Age: 41
Discharge: HOME | End: 2021-12-17
Payer: COMMERCIAL

## 2021-12-17 DIAGNOSIS — Z85.09 HISTORY OF MALIGNANT NEOPLASM OF APPENDIX: ICD-10-CM

## 2021-12-17 DIAGNOSIS — Z13.29 SCREENING FOR THYROID DISORDER: ICD-10-CM

## 2021-12-17 DIAGNOSIS — E78.5 HYPERLIPIDEMIA, UNSPECIFIED HYPERLIPIDEMIA TYPE: ICD-10-CM

## 2021-12-17 DIAGNOSIS — Z13.1 SCREENING FOR DIABETES MELLITUS: ICD-10-CM

## 2021-12-17 DIAGNOSIS — Z85.09 HISTORY OF MALIGNANT NEOPLASM OF APPENDIX: Primary | ICD-10-CM

## 2021-12-17 DIAGNOSIS — Z00.00 ANNUAL PHYSICAL EXAM: ICD-10-CM

## 2021-12-17 LAB
ALBUMIN SERPL BCP-MCNC: 4 G/DL (ref 3.5–5)
ALP SERPL-CCNC: 79 U/L (ref 46–116)
ALT SERPL W P-5'-P-CCNC: 26 U/L (ref 12–78)
ANION GAP SERPL CALCULATED.3IONS-SCNC: 8 MMOL/L (ref 4–13)
AST SERPL W P-5'-P-CCNC: 20 U/L (ref 5–45)
BILIRUB SERPL-MCNC: 0.71 MG/DL (ref 0.2–1)
BUN SERPL-MCNC: 7 MG/DL (ref 5–25)
CALCIUM SERPL-MCNC: 8.9 MG/DL (ref 8.3–10.1)
CEA SERPL-MCNC: <0.5 NG/ML (ref 0–3)
CHLORIDE SERPL-SCNC: 103 MMOL/L (ref 100–108)
CO2 SERPL-SCNC: 28 MMOL/L (ref 21–32)
CREAT SERPL-MCNC: 0.74 MG/DL (ref 0.6–1.3)
EST. AVERAGE GLUCOSE BLD GHB EST-MCNC: 97 MG/DL
GFR SERPL CREATININE-BSD FRML MDRD: 102 ML/MIN/1.73SQ M
GLUCOSE SERPL-MCNC: 87 MG/DL (ref 65–140)
HBA1C MFR BLD: 5 %
HCV AB SER QL: NORMAL
POTASSIUM SERPL-SCNC: 4 MMOL/L (ref 3.5–5.3)
PROT SERPL-MCNC: 7 G/DL (ref 6.4–8.2)
SODIUM SERPL-SCNC: 139 MMOL/L (ref 136–145)
TSH SERPL DL<=0.05 MIU/L-ACNC: 1.18 UIU/ML (ref 0.36–3.74)

## 2021-12-17 PROCEDURE — 82378 CARCINOEMBRYONIC ANTIGEN: CPT

## 2021-12-17 PROCEDURE — 71260 CT THORAX DX C+: CPT

## 2021-12-17 PROCEDURE — 74177 CT ABD & PELVIS W/CONTRAST: CPT

## 2021-12-17 PROCEDURE — 36415 COLL VENOUS BLD VENIPUNCTURE: CPT

## 2021-12-17 PROCEDURE — 80053 COMPREHEN METABOLIC PANEL: CPT

## 2021-12-17 PROCEDURE — G1004 CDSM NDSC: HCPCS

## 2021-12-17 PROCEDURE — 86803 HEPATITIS C AB TEST: CPT

## 2021-12-17 PROCEDURE — 84443 ASSAY THYROID STIM HORMONE: CPT

## 2021-12-17 PROCEDURE — 83036 HEMOGLOBIN GLYCOSYLATED A1C: CPT

## 2021-12-17 RX ADMIN — IOHEXOL 100 ML: 350 INJECTION, SOLUTION INTRAVENOUS at 10:39

## 2021-12-23 ENCOUNTER — TELEPHONE (OUTPATIENT)
Dept: HEMATOLOGY ONCOLOGY | Facility: CLINIC | Age: 41
End: 2021-12-23

## 2022-01-07 ENCOUNTER — OFFICE VISIT (OUTPATIENT)
Dept: SURGICAL ONCOLOGY | Facility: CLINIC | Age: 42
End: 2022-01-07
Payer: COMMERCIAL

## 2022-01-07 ENCOUNTER — TELEPHONE (OUTPATIENT)
Dept: SURGICAL ONCOLOGY | Facility: CLINIC | Age: 42
End: 2022-01-07

## 2022-01-07 VITALS
OXYGEN SATURATION: 98 % | TEMPERATURE: 98.4 F | SYSTOLIC BLOOD PRESSURE: 120 MMHG | WEIGHT: 217 LBS | HEART RATE: 78 BPM | HEIGHT: 68 IN | DIASTOLIC BLOOD PRESSURE: 70 MMHG | BODY MASS INDEX: 32.89 KG/M2 | RESPIRATION RATE: 16 BRPM

## 2022-01-07 DIAGNOSIS — R91.8 LUNG NODULES: ICD-10-CM

## 2022-01-07 DIAGNOSIS — C18.1 MALIGNANT TUMOR OF APPENDIX (HCC): ICD-10-CM

## 2022-01-07 DIAGNOSIS — Z80.8 FAMILY HISTORY OF THYROID CANCER: ICD-10-CM

## 2022-01-07 DIAGNOSIS — Z85.09 HISTORY OF MALIGNANT NEOPLASM OF APPENDIX: Primary | ICD-10-CM

## 2022-01-07 PROCEDURE — 3008F BODY MASS INDEX DOCD: CPT | Performed by: SURGERY

## 2022-01-07 PROCEDURE — 99214 OFFICE O/P EST MOD 30 MIN: CPT | Performed by: SURGERY

## 2022-01-07 PROCEDURE — 1036F TOBACCO NON-USER: CPT | Performed by: SURGERY

## 2022-01-07 NOTE — PROGRESS NOTES
Surgical Oncology Follow Up       [address] [facility] [place] [address]  [facility] [id number] [phone]    Damaso Zuniga  1980  [id number]  [address] [facility] [facility] [address]  [facility] [id number] [phone]    Diagnoses and all orders for this visit:    History of malignant neoplasm of appendix  -     CEA; Future  -     CT chest abdomen pelvis w contrast; Future  -     BUN; Future  -     Creatinine, serum; Future    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[facility])    Family history of thyroid cancer    Lung nodules        Chief Complaint   Patient presents with    Follow-up       Return in about 2 years (around 1/7/2024) for Imaging - See orders, Labs - See Treatment Plan  Oncology History    No history exists  Diagnosis and Staging: Low-grade mucinous appendiceal neoplasm  No high-grade dysplasia  Margins negative   Treatment History: Laparoscopic appendectomy December 2015   Current Therapy: Observation   Disease Status: MEGA     History of Present Illness: Patient returns in follow-up of her low-grade mucinous appendiceal neoplasm  She is doing well at this time with no complaints  No abdominal pain, nausea or vomiting, or weight loss  CT from December 17, 2021 reveals no evidence of recurrence  Her lung nodules have been stable since 2018 and are presumably benign  I personally reviewed the films  Her CEA level is normal   Patient also expresses concern regarding family history of thyroid cancer (mother and maternal grandmother), and whether or not screening is indicated  Review of Systems  Complete ROS Surg Onc:   Complete ROS Surg Onc:   Constitutional: The patient denies new or recent history of general fatigue, no recent weight loss, no change in appetite  Eyes: No complaints of visual problems, no scleral icterus     ENT: no complaints of ear pain, no hoarseness, no difficulty swallowing,  no tinnitus and no new masses in head, oral cavity, or neck  Cardiovascular: No complaints of chest pain, no palpitations, no ankle edema  Respiratory: No complaints of shortness of breath, no cough  Gastrointestinal: No complaints of jaundice, no bloody stools, no pale stools  Genitourinary: No complaints of dysuria, no hematuria, no nocturia, no frequent urination, no urethral discharge  Musculoskeletal: No complaints of weakness, paralysis, joint stiffness or arthralgias  Integumentary: No complaints of rash, no new lesions  Neurological: No complaints of convulsions, no seizures, no dizziness  Hematologic/Lymphatic: No complaints of easy bruising  Endocrine:  No hot or cold intolerance  No polydipsia, polyphagia, or polyuria  Allergy/immunology:  No environmental allergies  No food allergies  Not immunocompromised  Skin:  No pallor or rash  No wound          Patient Active Problem List   Diagnosis    Anxiety    Benign colon polyp    Chronic constipation    GERD without esophagitis    Hyperlipidemia    IBS (irritable bowel syndrome)    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([facility] [address] )    Vitamin D deficiency    History of loop electrical excision procedure (LEEP)    H/O right bundle branch block    Pain of both hip joints    Class 1 obesity due to excess calories without serious comorbidity with body mass index (BMI) of 30 0 to 30 9 in adult    Pain in both knees    Atypical chest pain    Palpitations    PVC (premature ventricular contraction)    Tendinitis of left foot    History of malignant neoplasm of appendix    Encounter for follow-up examination after completed treatment for malignant neoplasm    Screening for diabetes mellitus    Screening for cardiovascular condition    Screening for thyroid disorder    Family history of thyroid cancer    Lung nodules     Past Medical History:   Diagnosis Date    Anxiety     last assessed - [id number]    Colon polyp     Hyperlipidemia     last assessed - [id number]    IBS (irritable bowel syndrome)     Low grade mucinous neoplasm of appendix     last assessed - [id number]    Nausea     Normal delivery     2014 daughter   Raina Montes Palpitations     last assessed - [id number]    Right bundle branch block     last assessed - [id number]    Thyroid cyst     last assessed - [id number]    Vacuum extractor delivery, delivered     2012 daughter    Varicella     childhood    Vitamin D deficiency      Past Surgical History:   Procedure Laterality Date    APPENDECTOMY      COLPOSCOPY W/ BIOPSY / CURETTAGE      Colposcopy Cervix with Biopsy(s)    LACRIMAL DUCT PROBING      surgery of the Lacrimal system    CA COLONOSCOPY FLX DX W/COLLJ SPEC WHEN PFRMD N/A 2/15/2016    Procedure: EGD AND COLONOSCOPY;  Surgeon: Hardeep Ortega DO;  [facility];   Service: General    SUBTOTAL COLECTOMY      Partial colectomy - with resection of appendix    TEAR DUCT SURGERY      TOOTH EXTRACTION      last assessed - [id number]    TUMOR REMOVAL  12/2015    removed from appendix     Family History   Problem Relation Age of Onset    Heart murmur Mother         type unspecified     Cancer Mother         thyroid    Hyperlipidemia Father     Hypertension Father     Arthritis Maternal Grandmother     Coronary artery disease Maternal Grandmother     Transient ischemic attack Maternal Grandmother     Cancer Maternal Grandmother         thyroid    Hypertension Maternal Grandmother     Stroke Maternal Grandmother     Depression Paternal Grandmother     Cancer Maternal Aunt         lung    Lake syndrome Paternal Aunt     Cancer Paternal Aunt         lymphoma    Depression Sister     Cancer Paternal Grandfather         bowel    Heart disease Paternal Grandfather     Hyperlipidemia Paternal Grandfather     Hypertension Paternal Grandfather     Colon cancer Paternal Grandfather     Heart defect Maternal Aunt Social History     Socioeconomic History    Marital status: /Civil Union     Spouse name: Not on file    Number of children: Not on file    Years of education: Not on file    Highest education level: Not on file   Occupational History    Not on file   Tobacco Use    Smoking status: Never Smoker    Smokeless tobacco: Never Used   Vaping Use    Vaping Use: Never used   Substance and Sexual Activity    Alcohol use: Yes    Drug use: No    Sexual activity: Yes     Partners: Male     Birth control/protection: Male Sterilization   Other Topics Concern    Not on file   Social History Narrative    Activities: Soccer    Always uses seat belt    Drinks coffee    Exercise: Running    Exercises moderately less than 3 times a week         Social Determinants of Health     Financial Resource Strain: Not on file   Food Insecurity: Not on file   Transportation Needs: Not on file   Physical Activity: Not on file   Stress: Not on file   Social Connections: Not on file   Intimate Partner Violence: Not on file   Housing Stability: Not on file       Current Outpatient Medications:     Cholecalciferol (VITAMIN D) 2000 units CAPS, Take 3,000 Units by mouth  , Disp: , Rfl:     mometasone (ELOCON) 0 1 % cream, APPLY TO SKIN TWICE DAILY FOR ONE WEEK, THEN AS NEEDED, Disp: , Rfl:     Multiple Vitamins-Minerals (MULTIVITAMIN GUMMIES ADULT PO), Take by mouth, Disp: , Rfl:     Polyethylene Glycol 3350 (MIRALAX PO), Take 1 packet by mouth daily as needed  , Disp: , Rfl:     sertraline (ZOLOFT) 50 mg tablet, Take 1 tablet (50 mg total) by mouth daily, Disp: 90 tablet, Rfl: 1  Allergies   Allergen Reactions    Seasonal Ic [Cholestatin] Allergic Rhinitis    Amoxicillin Hives    Augmentin [Amoxicillin-Pot Clavulanate] Hives    Fluoxetine Other (See Comments)     Other reaction(s): bloating    Penicillins Rash    Wound Dressings Rash     Vitals:    01/07/22 1223   BP: 120/70   Pulse: 78   Resp: 16   Temp: 98 4 °F (36 9 °C)   SpO2: 98%       Physical Exam  Constitutional: General appearance: The Patient is well-developed and well-nourished who appears the stated age in no acute distress  Patient is pleasant and talkative  HEENT:  Normocephalic  Sclerae are anicteric  Mucous membranes are moist  Neck is supple without adenopathy  No JVD  Thyroid is smooth and mobile without evidence of nodules  No evidence of thyromegaly  Chest: The lungs are clear to auscultation  Cardiac: Heart is regular rate  Abdomen: Abdomen is soft, non-tender, non-distended and without masses  Extremities: There is no clubbing or cyanosis  There is no edema  Symmetric  Neuro: Grossly nonfocal  Gait is normal      Lymphatic: No evidence of cervical adenopathy bilaterally  No evidence of inguinal adenopathy bilaterally  Skin: Warm, anicteric  Psych:  Patient is pleasant and talkative  Breasts: Not examined  Labs:  12/17/2021  CEA <0 5   TSH 1 184    Imaging  CT chest abdomen pelvis w contrast    Result Date: 12/21/2021  Narrative: CT CHEST, ABDOMEN AND PELVIS WITH IV CONTRAST INDICATION:   Z85 09: Personal history of malignant neoplasm of other digestive organs  COMPARISON:  CT chest abdomen pelvis 11/6/2020  TECHNIQUE: CT examination of the chest, abdomen and pelvis was performed  Axial, sagittal, and coronal 2D reformatted images were created from the source data and submitted for interpretation  Radiation dose length product (DLP) for this visit:  940 mGy-cm   This examination, like all CT scans performed in the [facility], was performed utilizing techniques to minimize radiation dose exposure, including the use of iterative reconstruction and automated exposure control  IV Contrast:  100 mL of iohexol (OMNIPAQUE) Enteric Contrast: Enteric contrast was administered   FINDINGS: CHEST LUNGS:  Scattered stable 3 mm and smaller bilateral pulmonary nodules; for example 3 mm subpleural right middle lobe nodule #4/86  3 mm left lower lobe subpleural nodule #4/88    Other scattered nodules are also unchanged  No new suspicious nodules  No endotracheal or endobronchial lesion PLEURA:  Unremarkable  HEART/GREAT VESSELS: Heart is unremarkable for patient's age  No thoracic aortic aneurysm  MEDIASTINUM AND ROMEO:  Small hiatal hernia  CHEST WALL AND LOWER NECK:   Unremarkable  ABDOMEN LIVER/BILIARY TREE:  Unremarkable  GALLBLADDER:  No calcified gallstones  No pericholecystic inflammatory change  SPLEEN:  Unremarkable  PANCREAS:  Unremarkable  ADRENAL GLANDS:  Unremarkable  KIDNEYS/URETERS:  Unremarkable  No hydronephrosis  STOMACH AND BOWEL:  Unremarkable  APPENDIX:  Absent ABDOMINOPELVIC CAVITY:  No ascites  No pneumoperitoneum  No lymphadenopathy  VESSELS:  Unremarkable for patient's age  PELVIS REPRODUCTIVE ORGANS:  Unremarkable for patient's age  URINARY BLADDER:  Unremarkable  ABDOMINAL WALL/INGUINAL REGIONS:  Unremarkable  OSSEOUS STRUCTURES:  No acute fracture or destructive osseous lesion  Impression: No signs of residual, recurrent or metastatic malignancy in the abdomen or pelvis  Scattered 3 mm and smaller pulmonary nodules unchanged since 2018 are benign  Workstation performed: [id number]     I reviewed the above laboratory and imaging data  Discussion/Summary: 82-year-old female status post laparoscopic appendectomy for low-grade mucinous appendiceal neoplasm  There is no evidence of recurrence by imaging, symptomatology, or physical exam  Her CEA level is normal  I did discuss that the lung nodules are stable   I will see her again in 2 years with repeat imaging and a CEA level  Patient is up-to-date with colonoscopy, with her next one due in 2026   If she develops any symptoms in the interim we will obtain the CT and CEA sooner  Assuming her next imaging is normal, we will repeat her CT and CEA level every 2 years until we reached 10 years out    At that time, we will determine if further follow-up if needed  She is agreeable to this  With regard to her thyroid concerns, I explained that thyroid screening is generally not indicated for a family history of early stage papillary thyroid cancer  She reports having thyroid US performed many years ago, but has had no recent imaging  US report is not available for review today  Thyroid function is normal on most recent labwork  There are no palpable nodules and thyroid is not enlarged on today's exam  I have advised her to follow up with her PCP or Gyn for any additional thyroid concerns  All her questions were answered

## 2022-01-07 NOTE — TELEPHONE ENCOUNTER
Called and spoke with Charanjit Unger to see if she could come in sooner for her appt with Dr Ferguson today  Offered her 12pm and she agreed

## 2022-04-14 ENCOUNTER — HOSPITAL ENCOUNTER (EMERGENCY)
Facility: HOSPITAL | Age: 42
Discharge: HOME | End: 2022-04-14
Attending: EMERGENCY MEDICINE
Payer: COMMERCIAL

## 2022-04-14 ENCOUNTER — HOSPITAL ENCOUNTER (OUTPATIENT)
Dept: RADIOLOGY | Age: 42
Discharge: HOME | End: 2022-04-14
Payer: COMMERCIAL

## 2022-04-14 VITALS — BODY MASS INDEX: 30.31 KG/M2 | WEIGHT: 200 LBS | HEIGHT: 68 IN

## 2022-04-14 VITALS
DIASTOLIC BLOOD PRESSURE: 96 MMHG | OXYGEN SATURATION: 100 % | TEMPERATURE: 98.1 F | HEART RATE: 78 BPM | SYSTOLIC BLOOD PRESSURE: 150 MMHG | RESPIRATION RATE: 16 BRPM

## 2022-04-14 DIAGNOSIS — Z12.31 ENCOUNTER FOR SCREENING MAMMOGRAM FOR HIGH-RISK PATIENT: ICD-10-CM

## 2022-04-14 DIAGNOSIS — S61.219A FINGER LACERATION: Primary | ICD-10-CM

## 2022-04-14 PROCEDURE — 77063 BREAST TOMOSYNTHESIS BI: CPT

## 2022-04-14 PROCEDURE — 12001 RPR S/N/AX/GEN/TRNK 2.5CM/<: CPT | Performed by: EMERGENCY MEDICINE

## 2022-04-14 PROCEDURE — 77067 SCR MAMMO BI INCL CAD: CPT

## 2022-04-14 PROCEDURE — 99282 EMERGENCY DEPT VISIT SF MDM: CPT | Performed by: EMERGENCY MEDICINE

## 2022-04-14 PROCEDURE — 99282 EMERGENCY DEPT VISIT SF MDM: CPT

## 2022-04-14 RX ORDER — LIDOCAINE HYDROCHLORIDE 10 MG/ML
5 INJECTION, SOLUTION EPIDURAL; INFILTRATION; INTRACAUDAL; PERINEURAL ONCE
Status: COMPLETED | OUTPATIENT
Start: 2022-04-14 | End: 2022-04-14

## 2022-04-14 RX ORDER — LIDOCAINE HYDROCHLORIDE AND EPINEPHRINE 10; 10 MG/ML; UG/ML
1 INJECTION, SOLUTION INFILTRATION; PERINEURAL ONCE
Status: DISCONTINUED | OUTPATIENT
Start: 2022-04-14 | End: 2022-04-14

## 2022-04-14 RX ADMIN — LIDOCAINE HYDROCHLORIDE 5 ML: 10 INJECTION, SOLUTION EPIDURAL; INFILTRATION; INTRACAUDAL at 21:02

## 2022-04-15 NOTE — ED NOTES
Resident at bedside     Terri Kline, [facility]  04/14/22 2043

## 2022-04-15 NOTE — ED PROVIDER NOTES
History  Chief Complaint   Patient presents with    Finger Laceration     pt sliced finger on a   R hand 1st digit  82-year-old female presents for evaluation of right forefinger laceration just prior to presentation to ED  Patient reports she was using a serrated knife with her left hand (dominant hand) and lost control of the blade and cut the anterior aspect of the R forefinger  She reports copious bleeding however denies numbness/tingling or any other symptoms  No other injuries  Tetanus up-to-date  No other concerns  Prior to Admission Medications   Prescriptions Last Dose Informant Patient Reported? Taking? Cholecalciferol (VITAMIN D) 2000 units CAPS  Self Yes No   Sig: Take 3,000 Units by mouth     Multiple Vitamins-Minerals (MULTIVITAMIN GUMMIES ADULT PO)  Self Yes No   Sig: Take by mouth   Polyethylene Glycol 3350 (MIRALAX PO)  Self Yes No   Sig: Take 1 packet by mouth daily as needed     mometasone (ELOCON) 0 1 % cream  Self Yes No   Sig: APPLY TO SKIN TWICE DAILY FOR ONE WEEK, THEN AS NEEDED   sertraline (ZOLOFT) 50 mg tablet  Self No No   Sig: Take 1 tablet (50 mg total) by mouth daily      Facility-Administered Medications: None       Past Medical History:   Diagnosis Date    Anxiety     last assessed - [id number]    Cancer ([facility] [address] ) 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    Colon polyp     Hyperlipidemia     last assessed - [id number]    IBS (irritable bowel syndrome)     Low grade mucinous neoplasm of appendix     last assessed - [id number]    Nausea     Normal delivery     2014 daughter   Turner Muniz Palpitations     last assessed - [id number]    Right bundle branch block     last assessed - [id number]    Thyroid cyst     last assessed - 25HIF3737    Vacuum extractor delivery, delivered     2012 daughter    Varicella     childhood    Vitamin D deficiency        Past Surgical History:   Procedure Laterality Date    APPENDECTOMY      COLPOSCOPY W/ BIOPSY / CURETTAGE      Colposcopy Cervix with Biopsy(s)    LACRIMAL DUCT PROBING      surgery of the Lacrimal system    VT COLONOSCOPY FLX DX W/COLLJ SPEC WHEN PFRMD N/A 2/15/2016    Procedure: EGD AND COLONOSCOPY;  Surgeon: Kitty Reeder DO;  [facility]: [facility]; Service: General    SUBTOTAL COLECTOMY      Partial colectomy - with resection of appendix    TEAR DUCT SURGERY      TOOTH EXTRACTION      last assessed - 18Oct2015    TUMOR REMOVAL  12/2015    removed from appendix       Family History   Problem Relation Age of Onset    Heart murmur Mother         type unspecified     Cancer Mother         thyroid    Thyroid cancer Mother 52    Hyperlipidemia Father     Hypertension Father     Arthritis Maternal Grandmother     Coronary artery disease Maternal Grandmother     Transient ischemic attack Maternal Grandmother     Cancer Maternal Grandmother         thyroid    Hypertension Maternal Grandmother     Stroke Maternal Grandmother     Thyroid cancer Maternal Grandmother 52    Depression Paternal Grandmother     Cancer Maternal Aunt         lung    Lung cancer Maternal Aunt 48    Lake syndrome Paternal Aunt     Cancer Paternal Aunt         lymphoma; unknown age   Patric Hoops Depression Sister     Cancer Paternal Grandfather         bowel    Heart disease Paternal Grandfather     Hyperlipidemia Paternal Grandfather     Hypertension Paternal Grandfather     Colon cancer Paternal Grandfather 80    Esophageal cancer Paternal Grandfather 80    Heart defect Maternal Aunt     No Known Problems Daughter     No Known Problems Maternal Grandfather     No Known Problems Daughter     No Known Problems Son     No Known Problems Maternal Aunt     No Known Problems Maternal Aunt      I have reviewed and agree with the history as documented      E-Cigarette/Vaping    E-Cigarette Use Never User      E-Cigarette/Vaping Substances    Nicotine No     THC No     CBD No     Flavoring No     Other No     Unknown No      Social History     Tobacco Use    Smoking status: Never Smoker    Smokeless tobacco: Never Used   Vaping Use    Vaping Use: Never used   Substance Use Topics    Alcohol use: Yes    Drug use: No        Review of Systems   Constitutional: Negative  Negative for chills and fever  HENT: Negative  Eyes: Negative  Respiratory: Negative  Negative for cough and shortness of breath  Cardiovascular: Negative  Negative for chest pain and palpitations  Gastrointestinal: Negative  Negative for abdominal pain, nausea and vomiting  Endocrine: Negative  Genitourinary: Negative  Negative for dysuria  Musculoskeletal: Negative  Skin: Positive for wound  Negative for rash  Laceration to anterior aspect of R 2nd digit   Allergic/Immunologic: Negative  Neurological: Negative  Negative for dizziness, syncope, weakness, numbness and headaches  Hematological: Negative  Psychiatric/Behavioral: Negative  All other systems reviewed and are negative  Physical Exam  ED Triage Vitals [04/14/22 1929]   Temperature Pulse Respirations Blood Pressure SpO2   98 1 °F (36 7 °C) 78 16 150/96 100 %      Temp Source Heart Rate Source Patient Position - Orthostatic VS BP Location FiO2 (%)   Oral Monitor Sitting Left arm --      Pain Score       --             Orthostatic Vital Signs  Vitals:    04/14/22 1929   BP: 150/96   Pulse: 78   Patient Position - Orthostatic VS: Sitting       Physical Exam  Vitals and nursing note reviewed  Constitutional:       General: She is not in acute distress  Appearance: She is well-developed  HENT:      Head: Normocephalic and atraumatic  Eyes:      Conjunctiva/sclera: Conjunctivae normal    Pulmonary:      Effort: Pulmonary effort is normal  No respiratory distress  Abdominal:      Palpations: Abdomen is soft  Tenderness: There is no abdominal tenderness  Skin:     General: Skin is warm and dry        Comments: 1 5 cm laceration of the palmar aspect of the distal 2nd digit just distal to the DIP with copious active bleeding  No tendon or nerve involvement  No major vessel damage  ROM and sensations intact  Neurological:      Mental Status: She is alert and oriented to person, place, and time  Mental status is at baseline  Psychiatric:         Mood and Affect: Mood normal          Behavior: Behavior normal          ED Medications  Medications   lidocaine (PF) (XYLOCAINE-MPF) 1 % injection 5 mL (5 mL Infiltration Given by Other 4/14/22 2102)       Diagnostic Studies  Results Reviewed     None                 No orders to display         Procedures  Laceration repair    Date/Time: 4/14/2022 8:30 PM  Performed by: Corinne Rudd MD  Authorized by: Corinne Rudd MD   Consent: Verbal consent obtained  Risks and benefits: risks, benefits and alternatives were discussed  Consent given by: patient  Patient understanding: patient states understanding of the procedure being performed  Patient consent: the patient's understanding of the procedure matches consent given  Procedure consent: procedure consent matches procedure scheduled  Relevant documents: relevant documents present and verified  Test results: test results available and properly labeled  Site marked: the operative site was marked  Radiology Images displayed and confirmed  If images not available, report reviewed: imaging studies available  Patient identity confirmed: verbally with patient  Body area: upper extremity  Location details: right index finger  Laceration length: 1 5 cm  Foreign bodies: no foreign bodies  Tendon involvement: none  Nerve involvement: none  Vascular damage: no  Anesthesia: local infiltration    Anesthesia:  Local Anesthetic: lidocaine 1% without epinephrine  Anesthetic total: 2 5 mL    Sedation:  Patient sedated: no      Wound Dehiscence:  Superficial Wound Dehiscence: simple closure      Procedure Details:  Preparation: Patient was prepped and draped in the usual sterile fashion    Irrigation solution: saline  Irrigation method: jet lavage and syringe  Amount of cleaning: extensive  Debridement: none  Degree of undermining: none  Subcutaneous closure: 5-0 Chromic gut  Number of sutures: 4  Technique: simple  Approximation: close  Approximation difficulty: simple  Dressing: gauze roll and 4x4 sterile gauze  Patient tolerance: patient tolerated the procedure well with no immediate complications            ED Course                             SBIRT 20yo+      Most Recent Value   SBIRT (22 yo +)    In order to provide better care to our patients, we are screening all of our patients for alcohol and drug use  Would it be okay to ask you these screening questions? Unable to answer at this time Filed at: 04/14/2022 1949                [facility]  Number of Diagnoses or Management Options  Finger laceration  Diagnosis management comments: 68-year-old female presents for evaluation of right forefinger laceration just prior to presentation to ED  Patient tolerated laceration repair well  No complications  There was no bony involvement of the wound, x-ray not needed today  Tetanus was up-to-date per patient's report  Patient was given laceration wound repair instructions and return to ER precautions  All questions answered  No other concerns  Risk of Complications, Morbidity, and/or Mortality  Presenting problems: moderate  Diagnostic procedures: moderate  Management options: moderate    Patient Progress  Patient progress: stable      Disposition  Final diagnoses:   Finger laceration     Time reflects when diagnosis was documented in both MDM as applicable and the Disposition within this note     Time User Action Codes Description Comment    4/14/2022  9:20 PM Kristofer Doll Add [Q14 [id number]] Finger laceration       ED Disposition     ED Disposition Condition Date/Time Comment    Discharge Stable Thu Apr 14, 2022  9:21 PM Vivi Henning discharge to home/self care              Follow-up Information     Follow up With Specialties Details Why Contact Info Additional Information    Mouna Bolton, DO Internal Medicine  As needed [facility]        [address] Emergency Department Emergency Medicine  If symptoms worsen [address] Emergency Department, [address], [place], [place], [address]          Discharge Medication List as of 4/14/2022  9:21 PM      CONTINUE these medications which have NOT CHANGED    Details   Cholecalciferol (VITAMIN D) 2000 units CAPS Take 3,000 Units by mouth  , Historical Med      mometasone (ELOCON) 0 1 % cream APPLY TO SKIN TWICE DAILY FOR ONE WEEK, THEN AS NEEDED, Historical Med      Multiple Vitamins-Minerals (MULTIVITAMIN GUMMIES ADULT PO) Take by mouth, Historical Med      Polyethylene Glycol 3350 (MIRALAX PO) Take 1 packet by mouth daily as needed  , Until Discontinued, Historical Med      sertraline (ZOLOFT) 50 mg tablet Take 1 tablet (50 mg total) by mouth daily, Starting Thu 7/22/2021, Normal           No discharge procedures on file  PDMP Review     None           ED Provider  Attending physically available and evaluated Kayla Bryant I managed the patient along with the ED Attending      Electronically Signed by         Oralia Lieberman MD  04/14/22 6857

## 2022-04-15 NOTE — ED ATTENDING ATTESTATION
4/14/2022  ITima MD, saw and evaluated the patient  I have discussed the patient with the resident/non-physician practitioner and agree with the resident's/non-physician practitioner's findings, Plan of Care, and MDM as documented in the resident's/non-physician practitioner's note, except where noted  All available labs and Radiology studies were reviewed  I was present for key portions of any procedure(s) performed by the resident/non-physician practitioner and I was immediately available to provide assistance  At this point I agree with the current assessment done in the Emergency Department  I have conducted an independent evaluation of this patient a history and physical is as follows:    71-year-old female presented for evaluation of laceration on the volar aspect of her right index finger occurring while using a knife while taking at home  Tetanus up-to-date  Neurovascularly intact  Laceration closed by resident with absorbable suture  Stable for discharge      ED Course         Critical Care Time  Procedures

## 2022-04-19 ENCOUNTER — TELEPHONE (OUTPATIENT)
Dept: OBGYN CLINIC | Facility: CLINIC | Age: 42
End: 2022-04-19

## 2022-04-19 NOTE — TELEPHONE ENCOUNTER
Spoke with patient and made aware of Ayanna Damian's recommendation of :"Please let pt know that she needs an ultrasound to follow-up on her mammogram  Orders are already in - she just needs to call and schedule "  Phone number for central scheduling was provided

## 2022-04-28 ENCOUNTER — HOSPITAL ENCOUNTER (OUTPATIENT)
Dept: ULTRASOUND IMAGING | Facility: CLINIC | Age: 42
Discharge: HOME | End: 2022-04-28
Payer: COMMERCIAL

## 2022-04-28 DIAGNOSIS — R92.8 ABNORMAL MAMMOGRAM: ICD-10-CM

## 2022-04-28 PROCEDURE — 76642 ULTRASOUND BREAST LIMITED: CPT

## 2022-05-10 ENCOUNTER — ANNUAL EXAM (OUTPATIENT)
Dept: OBGYN CLINIC | Facility: CLINIC | Age: 42
End: 2022-05-10
Payer: COMMERCIAL

## 2022-05-10 VITALS
DIASTOLIC BLOOD PRESSURE: 64 MMHG | BODY MASS INDEX: 34.03 KG/M2 | WEIGHT: 216.8 LBS | SYSTOLIC BLOOD PRESSURE: 126 MMHG | HEIGHT: 67 IN

## 2022-05-10 DIAGNOSIS — Z01.419 ENCNTR FOR GYN EXAM (GENERAL) (ROUTINE) W/O ABN FINDINGS: ICD-10-CM

## 2022-05-10 DIAGNOSIS — Z12.31 ENCOUNTER FOR SCREENING MAMMOGRAM FOR MALIGNANT NEOPLASM OF BREAST: ICD-10-CM

## 2022-05-10 PROCEDURE — 3008F BODY MASS INDEX DOCD: CPT | Performed by: INTERNAL MEDICINE

## 2022-05-10 PROCEDURE — S0612 ANNUAL GYNECOLOGICAL EXAMINA: HCPCS | Performed by: PHYSICIAN ASSISTANT

## 2022-05-10 NOTE — PROGRESS NOTES
[facility]  1980      CC:  Yearly exam    S:  38 y o  female here for yearly exam  Her cycles are regular, not heavy or crampy  Sexual activity: She is sexually active without pain, bleeding or dryness  Contraception: She uses vasectomy for contraception  Last Pap 5/4/21 neg/neg (hx of LEEP in her early 25s with all normal Pap since)  Last Mammo never  Last Colonoscopy 5/6/21     We reviewed [facility] guidelines for Pap testing today  Family hx of breast cancer: no  Family hx of ovarian cancer: no  Family hx of colon cancer: no      Current Outpatient Medications:     Cholecalciferol (VITAMIN D) 2000 units CAPS, Take 3,000 Units by mouth  , Disp: , Rfl:     mometasone (ELOCON) 0 1 % cream, APPLY TO SKIN TWICE DAILY FOR ONE WEEK, THEN AS NEEDED, Disp: , Rfl:     Multiple Vitamins-Minerals (MULTIVITAMIN GUMMIES ADULT PO), Take by mouth, Disp: , Rfl:     Polyethylene Glycol 3350 (MIRALAX PO), Take 1 packet by mouth daily as needed  , Disp: , Rfl:     sertraline (ZOLOFT) 50 mg tablet, Take 1 tablet (50 mg total) by mouth daily, Disp: 90 tablet, Rfl: 1  Social History     Socioeconomic History    Marital status: /Civil Union     Spouse name: Not on file    Number of children: Not on file    Years of education: Not on file    Highest education level: Not on file   Occupational History    Not on file   Tobacco Use    Smoking status: Never Smoker    Smokeless tobacco: Never Used   Vaping Use    Vaping Use: Never used   Substance and Sexual Activity    Alcohol use:  Yes    Drug use: No    Sexual activity: Yes     Partners: Male     Birth control/protection: Male Sterilization   Other Topics Concern    Not on file   Social History Narrative    Activities: Soccer    Always uses seat belt    Drinks coffee    Exercise: Running    Exercises moderately less than 3 times a week         Social Determinants of Health     Financial Resource Strain: Not on file   Food Insecurity: Not on file Transportation Needs: Not on file   Physical Activity: Not on file   Stress: Not on file   Social Connections: Not on file   Intimate Partner Violence: Not on file   Housing Stability: Not on file     Family History   Problem Relation Age of Onset    Heart murmur Mother         type unspecified     Cancer Mother         thyroid    Thyroid cancer Mother 52    Hyperlipidemia Father     Hypertension Father     Arthritis Maternal Grandmother     Coronary artery disease Maternal Grandmother     Transient ischemic attack Maternal Grandmother     Cancer Maternal Grandmother         thyroid    Hypertension Maternal Grandmother     Stroke Maternal Grandmother     Thyroid cancer Maternal Grandmother 52    Depression Paternal Grandmother     Cancer Maternal Aunt         lung    Lung cancer Maternal Aunt 48    Lake syndrome Paternal Aunt     Cancer Paternal Aunt         lymphoma; unknown age   Citlali Mulann Depression Sister     Cancer Paternal Grandfather         bowel    Heart disease Paternal Grandfather     Hyperlipidemia Paternal Grandfather     Hypertension Paternal Grandfather     Colon cancer Paternal Grandfather 80    Esophageal cancer Paternal Grandfather 80    Heart defect Maternal Aunt     No Known Problems Daughter     No Known Problems Maternal Grandfather     No Known Problems Daughter     No Known Problems Son     No Known Problems Maternal Aunt     No Known Problems Maternal Aunt       Past Medical History:   Diagnosis Date    Anxiety     last assessed - [id number]    Cancer ([address] ) 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    Colon polyp     Hyperlipidemia     last assessed - [id number]    IBS (irritable bowel syndrome)     Low grade mucinous neoplasm of appendix     last assessed - [id number]    Nausea     Normal delivery     2014 daughter    Palpitations     last assessed - [id number]    Right bundle branch block     last assessed - [id number]    Thyroid cyst     last assessed - [id number]    Vacuum extractor delivery, delivered     2012 daughter    Varicella     childhood    Vitamin D deficiency         Review of Systems   Respiratory: Negative  Cardiovascular: Negative  Gastrointestinal: Negative for constipation and diarrhea  Genitourinary: Negative for difficulty urinating, pelvic pain, vaginal bleeding, vaginal discharge, itching or odor  O:  Blood pressure 126/64, height 5' 7 32" (1 71 m), weight 98 3 kg (216 lb 12 8 oz), last menstrual period 04/18/2022, not currently breastfeeding  Patient appears well and is not in distress  Neck is supple without masses  Breasts are symmetrical without mass, tenderness, nipple discharge, skin changes or adenopathy  Abdomen is soft and nontender without masses  External genitals are normal without lesions or rashes  Urethral meatus and urethra are normal  Bladder is normal to palpation  Vagina is normal without discharge or bleeding  Cervix is normal without discharge or lesion  Uterus is normal, mobile, nontender without palpable mass  Adnexa are normal, nontender, without palpable mass  A:  Yearly exam      P:   Pap 2026   Mammo slip provided    Colonoscopy 2026     RTO one year for yearly exam or sooner as needed

## 2022-05-15 ENCOUNTER — AMB VIDEO VISIT (OUTPATIENT)
Dept: OTHER | Facility: HOSPITAL | Age: 42
End: 2022-05-15
Payer: COMMERCIAL

## 2022-05-15 VITALS — TEMPERATURE: 97.3 F | OXYGEN SATURATION: 97 %

## 2022-05-15 PROCEDURE — 99212 OFFICE O/P EST SF 10 MIN: CPT | Performed by: NURSE PRACTITIONER

## 2022-05-15 NOTE — PROGRESS NOTES
Video Visit - [facility] 38 y o  female MRN: [id number]    REQUIRED DOCUMENTATION:         1  [facility] was [facility]  2  Provider located at [address] [place] [id number]  3  [facility] provider: LACI Ahumada  4  Identify all parties in room with patient during [facility] visit:  Patient   5  After connecting through [employer], patient was identified by name and date of birth  Patient was then informed that this was a Telemedicine visit and that the exam was being conducted confidentially over secure lines  My office door was closed  No one else was in the room  Patient acknowledged consent and understanding of privacy and security of the Telemedicine visit  I informed the patient that I have reviewed their record in Epic and presented the opportunity for them to ask any questions regarding the visit today  The patient agreed to participate  38year old female here today for video visit  She tested today and tested positive for covid  Symptoms started Friday night  She has cough  She is fatigued and tired today  She feels as though she has some chest congestion  No sob or wheezing  No loss of taste or smell  She is eating and drinking today,    Review of Systems   Constitutional: Positive for fatigue  Negative for activity change, chills and fever  HENT: Negative for congestion, rhinorrhea, sinus pressure and sinus pain  Respiratory: Positive for cough  Cardiovascular: Negative  Musculoskeletal: Negative  Skin: Negative  Neurological: Negative  Psychiatric/Behavioral: Negative  Physical Exam  Constitutional:       General: She is not in acute distress  Appearance: Normal appearance  She is not ill-appearing or toxic-appearing  HENT:      Head: Normocephalic and atraumatic  Nose: Congestion and rhinorrhea present     Eyes:      Conjunctiva/sclera: Conjunctivae normal    Pulmonary:      Effort: Pulmonary effort is normal  No respiratory distress  Comments: No cough or audible wheezing   Able to speak in full sentences   Skin:     Comments: No rash on head or neck  Neurological:      Mental Status: She is alert and oriented to person, place, and time  Psychiatric:         Mood and Affect: Mood normal          Behavior: Behavior normal          Thought Content: Thought content normal          Judgment: Judgment normal        There are no diagnoses linked to this encounter  Patient Instructions          Home test positive  Rest and drink extra fluids  Tylenol as needed for pain  Go to ER with any worsening symptoms, chest pain, sob, difficulty breathing, lethargy, confusion, dehyrdration, persistent fever 103 or higher  Discussed paxlovid but potential interaction with zoloft, call pcp tomorrow for follow up  Will hold off on starting medication  Please Be Courteous:  You are being tested for novel coronavirus (COVID-19) your test is pending at this time  You need to self quarantine at least until you have the results back  This means go home and stay home  Have someone else  your medications for you and bring them to you and drop them off at your door  Tests typically come back in 1-3 days  Results can be found in the "COVID-19" section of your [employer] account  In Your Home:  If you live with other people, trying to avoid common spaces and disinfect areas that you come into contact with  Per the CDC's recommendations: persons who suspect that they might have COVID-19 should isolate, stay at home, and use a separate bedroom and bathroom if feasible  Isolation should begin even before seeking testing and before test results become available  All household members should start wearing a mask in the home, particularly in shared spaces where appropriate distancing is not possible  Take Care of Yourself:  Try to sleep on your stomach as much as possible    If you have the ability to, take vitamin D3 2000 IU by mouth daily, vitamin-C 1000 mg by mouth twice a day, a multivitamin daily to help boost your immune system  You should check your temperature twice day  Return to the emergency department for any severe shortness of breath or pulse ox less than 90%  If You Test Positive for COVID-19 (Isolate)     Everyone, regardless of vaccination status:     · Stay home for 5 days  · If you have no symptoms or your symptoms are resolving after 5 days, you can leave your house  · Continue to wear a mask around others for 5 additional days  If you have a fever, continue to stay home until your fever resolves  If You Were Exposed to Someone with COVID-19 (Quarantine)  If you:  Have been boosted  OR  Completed the primary series of Pfizer or Moderna vaccine within the last 6 months  OR  Completed the primary series of J&J vaccine within the last 2 months     · Wear a mask around others for 10 days  · Test on day 5, if possible  If you develop symptoms get a test and stay home  If you:  Completed the primary series of Pfizer or Moderna vaccine over 6 months ago and are not boosted  OR  Completed the primary series of J&J over 2 months ago and are not boosted  OR  Are unvaccinated     · Stay home for 5 days  After that continue to wear a mask around others for 5 additional days  · If you cant quarantine you must wear a mask for 10 days  · Test on day 5 if possible  If you develop symptoms get a test and stay home           Follow up with PCP if not improved, if symptoms are worse, go to the ER

## 2022-05-15 NOTE — CARE ANYWHERE EVISITS
Visit Summary for Hershal Olp - Gender: Female - Date of Birth: [id number]  Date: [id number] - Duration: 7 minutes  Patient: Merissa Olp  Provider: Ella AGUERO    Patient Contact Information  Address  [address] [place] [address]  Abdirizak Peña  [phone]    Visit Topics  Covid [Added ByBrain Anger - 2022-05-15]    Triage Questions   What is your current physical address in the event of a medical emergency? Answer []  Are you allergic to any medications? Answer []  Are you now or could you be pregnant? Answer []  Do you have any immune system compromise or chronic lung   disease? Answer []  Do you have any vulnerable family members in the home (infant, pregnant, cancer, elderly)? Answer []     Conversation Transcripts  [0A][0A] [Notification] You are connected with Mario Neves, Urgent Care Specialist [0A][Notification] [facility] is located in [place]  [0A][Notification] [facility] has shared health history  Perry Morgan  [0A]    Diagnosis  COVID-19    Procedures  Value: [id number] Code: CPT-4 UNLISTED E&M SERVICE    Medications Prescribed    No prescriptions ordered    Electronically signed by: Mario Morelos(NPI [id number])

## 2022-05-15 NOTE — PATIENT INSTRUCTIONS
Home test positive  Rest and drink extra fluids  Tylenol as needed for pain  Go to ER with any worsening symptoms, chest pain, sob, difficulty breathing, lethargy, confusion, dehyrdration, persistent fever 103 or higher  Discussed paxlovid but potential interaction with zoloft, call pcp tomorrow for follow up  Will hold off on starting medication  Please Be Courteous:  You are being tested for novel coronavirus (COVID-19) your test is pending at this time  You need to self quarantine at least until you have the results back  This means go home and stay home  Have someone else  your medications for you and bring them to you and drop them off at your door  Tests typically come back in 1-3 days  Results can be found in the "COVID-19" section of your [employer] account  In Your Home:  If you live with other people, trying to avoid common spaces and disinfect areas that you come into contact with  Per the CDC's recommendations: persons who suspect that they might have COVID-19 should isolate, stay at home, and use a separate bedroom and bathroom if feasible  Isolation should begin even before seeking testing and before test results become available  All household members should start wearing a mask in the home, particularly in shared spaces where appropriate distancing is not possible  Take Care of Yourself:  Try to sleep on your stomach as much as possible  If you have the ability to, take vitamin D3 2000 IU by mouth daily, vitamin-C 1000 mg by mouth twice a day, a multivitamin daily to help boost your immune system  You should check your temperature twice day  Return to the emergency department for any severe shortness of breath or pulse ox less than 90%  If You Test Positive for COVID-19 (Isolate)     Everyone, regardless of vaccination status:     Stay home for 5 days  If you have no symptoms or your symptoms are resolving after 5 days, you can leave your house    Continue to wear a mask around others for 5 additional days  If you have a fever, continue to stay home until your fever resolves  If You Were Exposed to Someone with COVID-19 (Quarantine)  If you:  Have been boosted  OR  Completed the primary series of Pfizer or Moderna vaccine within the last 6 months  OR  Completed the primary series of J&J vaccine within the last 2 months     Wear a mask around others for 10 days  Test on day 5, if possible  If you develop symptoms get a test and stay home  If you:  Completed the primary series of Pfizer or Moderna vaccine over 6 months ago and are not boosted  OR  Completed the primary series of J&J over 2 months ago and are not boosted  OR  Are unvaccinated     Stay home for 5 days  After that continue to wear a mask around others for 5 additional days  If you cant quarantine you must wear a mask for 10 days  Test on day 5 if possible       If you develop symptoms get a test and stay home

## 2022-05-16 ENCOUNTER — TELEMEDICINE (OUTPATIENT)
Dept: INTERNAL MEDICINE CLINIC | Facility: CLINIC | Age: 42
End: 2022-05-16
Payer: COMMERCIAL

## 2022-05-16 ENCOUNTER — TELEPHONE (OUTPATIENT)
Dept: OTHER | Facility: OTHER | Age: 42
End: 2022-05-16

## 2022-05-16 DIAGNOSIS — U07.1 COVID-19: Primary | ICD-10-CM

## 2022-05-16 PROCEDURE — 99213 OFFICE O/P EST LOW 20 MIN: CPT | Performed by: INTERNAL MEDICINE

## 2022-05-16 PROCEDURE — 1036F TOBACCO NON-USER: CPT | Performed by: INTERNAL MEDICINE

## 2022-05-16 NOTE — PROGRESS NOTES
COVID-19 Outpatient Progress Note    Assessment/Plan:    Problem List Items Addressed This Visit    None     Visit Diagnoses     COVID-19    -  Primary         Disposition:     Patient has COVID-19 infection  Based off CDC guidelines, they were recommended to isolate for 5 days from the date of the positive test  If they remain asymptomatic, isolation may be ended followed by 5 days of wearing a mask when around othes to minimize risk of infecting others  If they have a fever, continue to stay home until fever resolves for at least 24 hours  Discussed symptom directed medication options with patient  Paxlovid discussed  Sertraline not a contraindication to taking Paxlovid  She will consider it and decide by tomorrow if she would like to take it  [place] Mortensener is an investigational medicine used to treat mild-to-moderate COVID-19 in adults and children (15years of age and older weighing at least 80 pounds (40 kg)) with positive results of direct SARS-CoV-2 viral testing, and who are at high risk for progression to severe COVID-19, including hospitalization or death  PAXLOVID is investigational because it is still being studied  There is limited information about the safety and effectiveness of using PAXLOVID to treat people with mild-to-moderate COVID-19  The FDA has authorized the emergency use of PAXLOVID for the treatment of mild-tomoderate COVID-19 in adults and children (15years of age and older weighing at least 80 pounds (40 kg)) with a positive test for the virus that causes COVID-19, and who are at high risk for progression to severe COVID-19, including hospitalization or death, under an EUA  What should I tell my healthcare provider before I take PAXLOVID?     Tell your healthcare provider if you:  Have any allergies  Have liver or kidney disease  Are pregnant or plan to become pregnant  Are breastfeeding a child  ave any serious illnesses    Tell your healthcare provider about all the medicines you take, including prescription and over-the-counter medicines, vitamins, and herbal supplements  Some medicines may interact with PAXLOVID and may cause serious side effects  Keep a list of your medicines to show your healthcare provider and pharmacist when you get a new medicine  You can ask your healthcare provider or pharmacist for a list of medicines that interact with PAXLOVID  Do not start taking a new medicine without telling your healthcare provider  Your healthcare provider can tell you if it is safe to take PAXLOVID with other medicines  Tell your healthcare provider if you are taking combined hormonal contraceptive  PAXLOVID may affect how your birth control pills work  Females who are able to become pregnant should use another effective alternative form of contraception or an additional barrier method of contraception  Talk to your healthcare provider if you have any questions about contraceptive methods that might be right for you  How do I take PAXLOVID? PAXLOVID consists of 2 medicines: nirmatrelvir and ritonavir  Take 2 pink tablets of nirmatrelvir with 1 white tablet of ritonavir by mouth 2 times each day (in the morning and in the evening) for 5 days  For each dose, take all 3 tablets at the same time  If you have kidney disease, talk to your healthcare provider  You may need a different dose  Swallow the tablets whole  Do not chew, break, or crush the tablets  Take PAXLOVID with or without food  Do not stop taking PAXLOVID without talking to your healthcare provider, even if you feel better  If you miss a dose of PAXLOVID within 8 hours of the time it is usually taken, take it as soon as you remember  If you miss a dose by more than 8 hours, skip the missed dose and take the next dose at your regular time  Do not take 2 doses of PAXLOVID at the same time    If you take too much PAXLOVID, call your healthcare provider or go to the nearest hospital emergency room right away   If you are taking a ritonavir- or cobicistat-containing medicine to treat hepatitis C or Human Immunodeficiency Virus (HIV), you should continue to take your medicine as prescribed by your healthcare provider  Talk to your healthcare provider if you do not feel better or if you feel worse after 5 days  Who should generally not take PAXLOVID? Do not take PAXLOVID if:  You are allergic to nirmatrelvir, ritonavir, or any of the ingredients in PAXLOVID  You are taking any of the following medicines:  Alfuzosin  Pethidine, piroxicam, propoxyphene  Ranolazine  Amiodarone, dronedarone, flecainide, propafenone, quinidine  Colchicine  Lurasidone, pimozide, clozapine  Dihydroergotamine, ergotamine, methylergonovine  Lovastatin, simvastatin  Sildenafil (Revatio®) for pulmonary arterial hypertension (PAH)  Triazolam, oral midazolam  Apalutamide  Carbamazepine, phenobarbital, phenytoin  Rifampin  St  Satishs Wort (hypericum perforatum)    What are the important possible side effects of PAXLOVID? Possible side effects of PAXLOVID are:  Liver Problems  Tell your healthcare provider right away if you have any of these signs and symptoms of liver problems: loss of appetite, yellowing of your skin and the whites of eyes (jaundice), dark-colored urine, pale colored stools and itchy skin, stomach area (abdominal) pain  Resistance to HIV Medicines  If you have untreated HIV infection, PAXLOVID may lead to some HIV medicines not working as well in the future  Other possible side effects include: altered sense of taste, diarrhea, high blood pressure, or muscle aches    These are not all the possible side effects of PAXLOVID  Not many people have taken PAXLOVID  Serious and unexpected side effects may happen  Darrell Lira is still being studied, so it is possible that all of the risks are not known at this time  What other treatment choices are there?   Like Bonny Manges may allow for the emergency use of other medicines to treat people with COVID-19  Go to https://[employer]/ for information on the emergency use of other medicines that are authorized by FDA to treat people with COVID-19  Your healthcare provider may talk with you about clinical trials for which you may be eligible  It is your choice to be treated or not to be treated with PAXLOVID  Should you decide not to receive it or for your child not to receive it, it will not change your standard medical care  What if I am pregnant or breastfeeding? There is no experience treating pregnant women or breastfeeding mothers with PAXLOVID  For a mother and unborn baby, the benefit of taking PAXLOVID may be greater than the risk from the treatment  If you are pregnant, discuss your options and specific situation with your healthcare provider  It is recommended that you use effective barrier contraception or do not have sexual activity while taking PAXLOVID  If you are breastfeeding, discuss your options and specific situation with your healthcare provider  ---      How do I report side effects with PAXLOVID? Contact your healthcare provider if you have any side effects that bother you or do not go away  Report side effects to FDA MedWatch at www fda gov/medwatch or call 6-215-WNS9009 or you can report side effects to [employer]  at the contact information provided below  Website Fax number Telephone number  [employer] [phone]    How should I store Jonathan Kingsus? Store PAXLOVID tablets at room temperature between 68°F to 77°F (20°C to 25°C)  Full fact sheet for patients, parents, and caregivers can be found at: [employer]      I have spent 15 minutes directly with the patient   Greater than 50% of this time was spent in counseling/coordination of care regarding: risks and benefits of treatment options, instructions for management and impressions  Encounter provider Sharath Lancaster MD    Provider located at [address] [place] [id number]    Recent Visits  No visits were found meeting these conditions  Showing recent visits within past 7 days and meeting all other requirements  Today's Visits  Date Type Provider Dept   05/16/22 Telemedicine Sharath Lancaster MD [facility] today's visits and meeting all other requirements  Future Appointments  No visits were found meeting these conditions  Showing future appointments within next 150 days and meeting all other requirements     This virtual check-in was done via telephone and she agrees to proceed  Patient agrees to participate in a virtual check in via telephone or video visit instead of presenting to the office to address urgent/immediate medical needs  Patient is aware this is a billable service  After connecting through Telephone, the patient was identified by name and date of birth  Lora Cooper was informed that this was a telemedicine visit and that the exam was being conducted confidentially over secure lines  My office door was closed  No one else was in the room  Lora Cooper acknowledged consent and understanding of privacy and security of the telemedicine visit  I informed the patient that I have reviewed her record in Epic and presented the opportunity for her to ask any questions regarding the visit today  The patient agreed to participate  Verification of patient location:  Patient is located in the following state in which I hold an active license: [place]    Subjective:   Lora Cooper is a 38 y o  female who has been screened for COVID-19  Symptom change since last report: unchanged  Patient's symptoms include chills, nasal congestion, rhinorrhea and cough   Patient denies fever, fatigue, sore throat, anosmia, loss of taste, shortness of breath, chest tightness, abdominal pain, nausea, vomiting, diarrhea, myalgias and headaches  - Date of symptom onset: 5/14/2022  - Date of positive COVID-19 test: 5/15/2022  Type of test: Home antigen  COVID-19 vaccination status: Fully vaccinated with booster    Ok Julia has been staying home and has isolated themselves in her home  She is taking care to not share personal items and is cleaning all surfaces that are touched often, like counters, tabletops, and doorknobs using household cleaning sprays or wipes  She is wearing a mask when she leaves her room  Virtual visit yesterday and Paxlovid mentioned    No results found for: [address] [facility], [facility], [address] [facility], [facility], 350 [facility], 700 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    Colon polyp     Hyperlipidemia     last assessed - [id number]    IBS (irritable bowel syndrome)     Low grade mucinous neoplasm of appendix     last assessed - [id number]    Nausea     Normal delivery     2014 daughter   Josh Drop Palpitations     last assessed - [id number]    Right bundle branch block     last assessed - [id number]    Thyroid cyst     last assessed - [id number]    Vacuum extractor delivery, delivered     2012 daughter    Varicella     childhood    Vitamin D deficiency      Past Surgical History:   Procedure Laterality Date    APPENDECTOMY      COLPOSCOPY W/ BIOPSY / CURETTAGE      Colposcopy Cervix with Biopsy(s)    LACRIMAL DUCT PROBING      surgery of the Lacrimal system    DC COLONOSCOPY FLX DX W/COLLJ SPEC WHEN PFRMD N/A 2/15/2016    Procedure: EGD AND COLONOSCOPY;  Surgeon: Linda Lo DO;  Location: [facility];   Service: General    SUBTOTAL COLECTOMY      Partial colectomy - with resection of appendix    TEAR DUCT SURGERY      TOOTH EXTRACTION      last assessed - [id number]    TUMOR REMOVAL  12/2015    removed from appendix     Current Outpatient Medications Medication Sig Dispense Refill    Cholecalciferol (VITAMIN D) 2000 units CAPS Take 3,000 Units by mouth        mometasone (ELOCON) 0 1 % cream APPLY TO SKIN TWICE DAILY FOR ONE WEEK, THEN AS NEEDED      Multiple Vitamins-Minerals (MULTIVITAMIN GUMMIES ADULT PO) Take by mouth      Polyethylene Glycol 3350 (MIRALAX PO) Take 1 packet by mouth daily as needed   sertraline (ZOLOFT) 50 mg tablet Take 1 tablet (50 mg total) by mouth daily 90 tablet 1     No current facility-administered medications for this visit  Allergies   Allergen Reactions    Seasonal Ic [Cholestatin] Allergic Rhinitis    Amoxicillin Hives    Augmentin [Amoxicillin-Pot Clavulanate] Hives    Fluoxetine Other (See Comments)     Other reaction(s): bloating    Penicillins Rash    Wound Dressings Rash       Review of Systems   Constitutional: Positive for chills  Negative for fatigue and fever  HENT: Positive for congestion and rhinorrhea  Negative for sore throat  Respiratory: Positive for cough  Negative for chest tightness and shortness of breath  Gastrointestinal: Negative for abdominal pain, diarrhea, nausea and vomiting  Musculoskeletal: Negative for myalgias  Neurological: Negative for headaches  Objective: There were no vitals filed for this visit  Physical Exam    VIRTUAL VISIT DISCLAIMER    Nikko Mera verbally agrees to participate in [employer]  Pt is aware that [employer] could be limited without vital signs or the ability to perform a full hands-on physical Winnie Thomas understands she or the provider may request at any time to terminate the video visit and request the patient to seek care or treatment in person

## 2022-05-16 NOTE — TELEPHONE ENCOUNTER
Patient calling in stating she had a VV yesterday for Covid + and she would like to follow up with Dr Radha Cline office to make sure there is nothing else that she needs to do at this time for her symptoms  Please review visit and follow up with patient

## 2022-05-16 NOTE — PATIENT INSTRUCTIONS
Jeramy Donis is an investigational medicine used to treat mild-to-moderate COVID-19 in adults and children [15years of age and older weighing at least 80 pounds (36 kg)] with positive results of direct SARS-CoV-2 viral testing, and who are at high risk for progression to severe COVID-19, including hospitalization or death  PAXLOVID is investigational because it is still being studied  There is limited information about the safety and effectiveness of using PAXLOVID to treat people with mild-to-moderate COVID-19  The FDA has authorized the emergency use of PAXLOVID for the treatment of mild-tomoderate COVID-19 in adults and children [15years of age and older weighing at least 80 pounds (36 kg)] with a positive test for the virus that causes COVID-19, and who are at high risk for progression to severe COVID-19, including hospitalization or death, under an EUA  What should I tell my healthcare provider before I take PAXLOVID? Tell your healthcare provider if you:  Have any allergies  Have liver or kidney disease  Are pregnant or plan to become pregnant  Are breastfeeding a child  ave any serious illnesses    Tell your healthcare provider about all the medicines you take, including prescription and over-the-counter medicines, vitamins, and herbal supplements  Some medicines may interact with PAXLOVID and may cause serious side effects  Keep a list of your medicines to show your healthcare provider and pharmacist when you get a new medicine  You can ask your healthcare provider or pharmacist for a list of medicines that interact with PAXLOVID  Do not start taking a new medicine without telling your healthcare provider  Your healthcare provider can tell you if it is safe to take PAXLOVID with other medicines  Tell your healthcare provider if you are taking combined hormonal contraceptive  PAXLOVID may affect how your birth control pills work   Females who are able to become pregnant should use another effective alternative form of contraception or an additional barrier method of contraception  Talk to your healthcare provider if you have any questions about contraceptive methods that might be right for you  How do I take PAXLOVID? PAXLOVID consists of 2 medicines: nirmatrelvir and ritonavir  Take 2 pink tablets of nirmatrelvir with 1 white tablet of ritonavir by mouth 2 times each day (in the morning and in the evening) for 5 days  For each dose, take all 3 tablets at the same time  If you have kidney disease, talk to your healthcare provider  You may need a different dose  Swallow the tablets whole  Do not chew, break, or crush the tablets  Take PAXLOVID with or without food  Do not stop taking PAXLOVID without talking to your healthcare provider, even if you feel better  If you miss a dose of PAXLOVID within 8 hours of the time it is usually taken, take it as soon as you remember  If you miss a dose by more than 8 hours, skip the missed dose and take the next dose at your regular time  Do not take 2 doses of PAXLOVID at the same time  If you take too much PAXLOVID, call your healthcare provider or go to the nearest hospital emergency room right away  If you are taking a ritonavir- or cobicistat-containing medicine to treat hepatitis C or Human Immunodeficiency Virus (HIV), you should continue to take your medicine as prescribed by your healthcare provider  Talk to your healthcare provider if you do not feel better or if you feel worse after 5 days  Who should generally not take PAXLOVID? Do not take PAXLOVID if:  You are allergic to nirmatrelvir, ritonavir, or any of the ingredients in PAXLOVID    You are taking any of the following medicines:  Alfuzosin  Pethidine, piroxicam, propoxyphene  Ranolazine  Amiodarone, dronedarone, flecainide, propafenone, quinidine  Colchicine  Lurasidone, pimozide, clozapine  Dihydroergotamine, ergotamine, methylergonovine  Lovastatin, simvastatin  Sildenafil (Revatio®) for pulmonary arterial hypertension (PAH)  Triazolam, oral midazolam  Apalutamide  Carbamazepine, phenobarbital, phenytoin  Rifampin  St  Satishs Wort (hypericum perforatum)    What are the important possible side effects of PAXLOVID? Possible side effects of PAXLOVID are:  Liver Problems  Tell your healthcare provider right away if you have any of these signs and symptoms of liver problems: loss of appetite, yellowing of your skin and the whites of eyes (jaundice), dark-colored urine, pale colored stools and itchy skin, stomach area (abdominal) pain  Resistance to HIV Medicines  If you have untreated HIV infection, PAXLOVID may lead to some HIV medicines not working as well in the future  Other possible side effects include: altered sense of taste, diarrhea, high blood pressure, or muscle aches    These are not all the possible side effects of PAXLOVID  Not many people have taken PAXLOVID  Serious and unexpected side effects may happen  Braulioxavier Soto is still being studied, so it is possible that all of the risks are not known at this time  What other treatment choices are there? Like Katina Pert may allow for the emergency use of other medicines to treat people with COVID-19  Go to https://[employer]/ for information on the emergency use of other medicines that are authorized by FDA to treat people with COVID-19  Your healthcare provider may talk with you about clinical trials for which you may be eligible  It is your choice to be treated or not to be treated with PAXLOVID  Should you decide not to receive it or for your child not to receive it, it will not change your standard medical care  What if I am pregnant or breastfeeding? There is no experience treating pregnant women or breastfeeding mothers with PAXLOVID   For a mother and unborn baby, the benefit of taking PAXLOVID may be greater than the risk from the treatment  If you are pregnant, discuss your options and specific situation with your healthcare provider  It is recommended that you use effective barrier contraception or do not have sexual activity while taking PAXLOVID  If you are breastfeeding, discuss your options and specific situation with your healthcare provider  How do I report side effects with PAXLOVID? Contact your healthcare provider if you have any side effects that bother you or do not go away  Report side effects to FDA MedWatch at www [employer] gov/medwatch or call 9-575-RQI0770 or you can report side effects to [facility]  at the contact information provided below  Website Fax number Telephone number   "[employer] [phone]     How should I store Bety Connolly? Store PAXLOVID tablets at room temperature between 68°F to 77°F (20°C to 25°C)    Full fact sheet for patients, parents, and caregivers can be found at: http://www [employer]/

## 2022-06-06 ENCOUNTER — RA CDI HCC (OUTPATIENT)
Dept: OTHER | Facility: HOSPITAL | Age: 42
End: 2022-06-06

## 2022-06-06 NOTE — PROGRESS NOTES
[facility]  coding opportunities       Chart reviewed, no opportunity found: CHART REVIEWED, NO OPPORTUNITY FOUND        Patients Insurance        Commercial Insurance: [address] [place] [address]

## 2022-06-11 DIAGNOSIS — F41.9 ANXIETY: ICD-10-CM

## 2022-06-13 ENCOUNTER — OFFICE VISIT (OUTPATIENT)
Dept: INTERNAL MEDICINE CLINIC | Facility: CLINIC | Age: 42
End: 2022-06-13
Payer: COMMERCIAL

## 2022-06-13 VITALS
DIASTOLIC BLOOD PRESSURE: 82 MMHG | RESPIRATION RATE: 16 BRPM | WEIGHT: 215.2 LBS | SYSTOLIC BLOOD PRESSURE: 122 MMHG | HEART RATE: 61 BPM | OXYGEN SATURATION: 98 % | HEIGHT: 67 IN | BODY MASS INDEX: 33.78 KG/M2

## 2022-06-13 DIAGNOSIS — R20.2 TINGLING: ICD-10-CM

## 2022-06-13 DIAGNOSIS — E66.09 CLASS 1 OBESITY DUE TO EXCESS CALORIES WITHOUT SERIOUS COMORBIDITY WITH BODY MASS INDEX (BMI) OF 30.0 TO 30.9 IN ADULT: ICD-10-CM

## 2022-06-13 DIAGNOSIS — Z13.6 SCREENING FOR CARDIOVASCULAR CONDITION: ICD-10-CM

## 2022-06-13 DIAGNOSIS — U07.1 COVID-19: ICD-10-CM

## 2022-06-13 DIAGNOSIS — E78.2 MODERATE MIXED HYPERLIPIDEMIA NOT REQUIRING STATIN THERAPY: Primary | ICD-10-CM

## 2022-06-13 PROBLEM — R49.0 HOARSENESS OF VOICE: Status: ACTIVE | Noted: 2022-06-13

## 2022-06-13 PROCEDURE — 99214 OFFICE O/P EST MOD 30 MIN: CPT | Performed by: INTERNAL MEDICINE

## 2022-06-13 PROCEDURE — 3008F BODY MASS INDEX DOCD: CPT | Performed by: INTERNAL MEDICINE

## 2022-06-13 PROCEDURE — 1036F TOBACCO NON-USER: CPT | Performed by: INTERNAL MEDICINE

## 2022-06-13 NOTE — PROGRESS NOTES
Assessment/Plan:    Hyperlipidemia  Low-cholesterol diet check lipid profile    Class 1 obesity due to excess calories without serious comorbidity with body mass index (BMI) of 30 0 to 30 9 in adult  Obesity -I have counseled patient following healthy and balanced diet, I would like the patient to lose weight, I would like the patient exercise routinely; we will continue monitor the patient's progress  Screening for cardiovascular condition  I have counselled the pt to follow a healthy and balanced diet ,and recommend routine exercise  COVID-19  Did not need to take paxlovid symptoms did resolve    Hoarseness of voice  Symptoms did resolve after  the school year ended likely vocal cord strain at this point time the patient like to hold off on diagnostic evaluation she will monitor symptoms if there is any change or recurrence she will notify me have the time of her symptoms for further evaluation nonsmoker    Tingling  Tingling of the finger status post laceration several months ago reports me improvements of the symptoms but stiffness of the D IP joint reports me he very cautionary after the injury secondary to pain ; currently there is some mild stiffness she would like to hold off on hand therapy but will do range-of-motion exercises if does not see improvement in the next 4 weeks please notify me for formal hand therapy consult         Problem List Items Addressed This Visit        Other    Hyperlipidemia - Primary     Low-cholesterol diet check lipid profile           Relevant Orders    Comprehensive metabolic panel    Lipid Panel with Direct LDL reflex    Class 1 obesity due to excess calories without serious comorbidity with body mass index (BMI) of 30 0 to 30 9 in adult     Obesity -I have counseled patient following healthy and balanced diet, I would like the patient to lose weight, I would like the patient exercise routinely; we will continue monitor the patient's progress             Relevant Orders    Hemoglobin A1C    TSH, 3rd generation    Screening for cardiovascular condition     I have counselled the pt to follow a healthy and balanced diet ,and recommend routine exercise  Relevant Orders    Lipid Panel with Direct LDL reflex    COVID-19     Did not need to take paxlovid symptoms did resolve           Tingling     Tingling of the finger status post laceration several months ago reports me improvements of the symptoms but stiffness of the D IP joint reports me he very cautionary after the injury secondary to pain ; currently there is some mild stiffness she would like to hold off on hand therapy but will do range-of-motion exercises if does not see improvement in the next 4 weeks please notify me for formal hand therapy consult               vocal cord strain  RTO in 6 months call if any problems  Subjective:      Patient ID: Kathy Guevara is a 38 y o  female  HPI 40 please set this up Mireille hyperlipidemia, hoarse voice, obesity, COVID-19; The patient reports me compliant taking medications without untoward side effects the  The patient is here to review his medical condition, update me on the medical condition and the patient reports me no hospitalizations and 1 ER visits    working out till April stronger and clothing , reports me stiffness of the distal interphalangeal joint of the right index finger she had had a laceration went to ER at 4 sutures; may cov- home test positive, chronic pnd,fatigue ,  Slight cough, arthralgia , home test, Sunday night sweating , next day no fatigue , aches  No paxlovid   She reports stiffnes of the joint feels like needs to carac; she is left handed; reports sx a improving ,   The following portions of the patient's history were reviewed and updated as appropriate: allergies, current medications, past family history, past medical history, past social history, past surgical history and problem list     Review of Systems   Constitutional: Negative for activity change, appetite change and unexpected weight change  HENT: Negative for congestion and postnasal drip  Eyes: Negative for visual disturbance  Respiratory: Negative for cough and shortness of breath  Cardiovascular: Negative for chest pain  Gastrointestinal: Negative for abdominal pain, diarrhea, nausea and vomiting  Neurological: Positive for numbness  Negative for dizziness, light-headedness and headaches  Objective:    Return in about 6 months (around 12/13/2022)  No results found  Allergies   Allergen Reactions    Seasonal Ic [Cholestatin] Allergic Rhinitis    Amoxicillin Hives    Augmentin [Amoxicillin-Pot Clavulanate] Hives    Fluoxetine Other (See Comments)     Other reaction(s): bloating    Penicillins Rash    Wound Dressings Rash       Past Medical History:   Diagnosis Date    Anxiety     last assessed - [id number]    Cancer ([address] ) 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    Colon polyp     Hyperlipidemia     last assessed - [id number]    IBS (irritable bowel syndrome)     Low grade mucinous neoplasm of appendix     last assessed - [id number]    Nausea     Normal delivery     2014 daughter   Naveen Veliz Palpitations     last assessed - [id number]    Right bundle branch block     last assessed - [id number]    Thyroid cyst     last assessed - [id number]    Vacuum extractor delivery, delivered     2012 daughter    Varicella     childhood    Vitamin D deficiency      Past Surgical History:   Procedure Laterality Date    APPENDECTOMY      COLPOSCOPY W/ BIOPSY / CURETTAGE      Colposcopy Cervix with Biopsy(s)    LACRIMAL DUCT PROBING      surgery of the Lacrimal system    MD COLONOSCOPY FLX DX W/COLLJ SPEC WHEN PFRMD N/A 2/15/2016    Procedure: EGD AND COLONOSCOPY;  Surgeon: Triston Schmid DO;  Location: [facility];   Service: General    SUBTOTAL COLECTOMY      Partial colectomy - with resection of appendix    TEAR DUCT SURGERY      TOOTH EXTRACTION      last assessed - [id number]   Naveen Veliz TUMOR REMOVAL  12/2015    removed from appendix     Current Outpatient Medications on File Prior to Visit   Medication Sig Dispense Refill    Cholecalciferol (VITAMIN D) 2000 units CAPS Take 3,000 Units by mouth        mometasone (ELOCON) 0 1 % cream APPLY TO SKIN TWICE DAILY FOR ONE WEEK, THEN AS NEEDED      Multiple Vitamins-Minerals (MULTIVITAMIN GUMMIES ADULT PO) Take by mouth      Polyethylene Glycol 3350 (MIRALAX PO) Take 1 packet by mouth daily as needed   sertraline (ZOLOFT) 50 mg tablet TAKE 1 TABLET BY MOUTH EVERY DAY 30 tablet 5     No current facility-administered medications on file prior to visit       Family History   Problem Relation Age of Onset    Heart murmur Mother         type unspecified     Cancer Mother         thyroid    Thyroid cancer Mother 52    Hyperlipidemia Father     Hypertension Father     Arthritis Maternal Grandmother     Coronary artery disease Maternal Grandmother     Transient ischemic attack Maternal Grandmother     Cancer Maternal Grandmother         thyroid    Hypertension Maternal Grandmother     Stroke Maternal Grandmother     Thyroid cancer Maternal Grandmother 52    Depression Paternal Grandmother     Cancer Maternal Aunt         lung    Lung cancer Maternal Aunt 48    Lake syndrome Paternal Aunt     Cancer Paternal Aunt         lymphoma; unknown age   Verna Carlos Depression Sister     Cancer Paternal Grandfather         bowel    Heart disease Paternal Grandfather     Hyperlipidemia Paternal Grandfather     Hypertension Paternal Grandfather     Colon cancer Paternal Grandfather 80    Esophageal cancer Paternal Grandfather 80    Heart defect Maternal Aunt     No Known Problems Daughter     No Known Problems Maternal Grandfather     No Known Problems Daughter     No Known Problems Son     No Known Problems Maternal Aunt     No Known Problems Maternal Aunt      Social History     Socioeconomic History    Marital status: /Civil Union Spouse name: Not on file    Number of children: Not on file    Years of education: Not on file    Highest education level: Not on file   Occupational History    Not on file   Tobacco Use    Smoking status: Never Smoker    Smokeless tobacco: Never Used   Vaping Use    Vaping Use: Never used   Substance and Sexual Activity    Alcohol use: Yes    Drug use: No    Sexual activity: Yes     Partners: Male     Birth control/protection: Male Sterilization   Other Topics Concern    Not on file   Social History Narrative    Activities: Soccer    Always uses seat belt    Drinks coffee    Exercise: Running    Exercises moderately less than 3 times a week         Social Determinants of Health     Financial Resource Strain: Not on file   Food Insecurity: Not on file   Transportation Needs: Not on file   Physical Activity: Not on file   Stress: Not on file   Social Connections: Not on file   Intimate Partner Violence: Not on file   Housing Stability: Not on file     Vitals:    06/13/22 1126   BP: 122/82   Pulse: 61   Resp: 16   SpO2: 98%   Weight: 97 6 kg (215 lb 3 2 oz)   Height: 5' 7 32" (1 71 m)     Results for orders placed or performed in visit on 12/17/21   CEA   Result Value Ref Range    CEA <0 5 0 0 - 3 0 ng/mL   Comprehensive metabolic panel   Result Value Ref Range    Sodium 139 136 - 145 mmol/L    Potassium 4 0 3 5 - 5 3 mmol/L    Chloride 103 100 - 108 mmol/L    CO2 28 21 - 32 mmol/L    ANION GAP 8 4 - 13 mmol/L    BUN 7 5 - 25 mg/dL    Creatinine 0 74 0 60 - 1 30 mg/dL    Glucose 87 65 - 140 mg/dL    Calcium 8 9 8 3 - 10 1 mg/dL    AST 20 5 - 45 U/L    ALT 26 12 - 78 U/L    Alkaline Phosphatase 79 46 - 116 U/L    Total Protein 7 0 6 4 - 8 2 g/dL    Albumin 4 0 3 5 - 5 0 g/dL    Total Bilirubin 0 71 0 20 - 1 00 mg/dL    eGFR 102 ml/min/1 73sq m   Hemoglobin A1C   Result Value Ref Range    Hemoglobin A1C 5 0 Normal 3 8-5 6%; PreDiabetic 5 7-6 4%;  Diabetic >=6 5%; Glycemic control for adults with diabetes <7 0% %    EAG 97 mg/dl   TSH, 3rd generation   Result Value Ref Range    TSH 3RD GENERATON 1 184 0 358 - 3 740 uIU/mL   Hepatitis C Antibody ([facility])   Result Value Ref Range    Hepatitis C Ab Non-reactive Non-reactive     Weight (last 2 days)     Date/Time Weight    06/13/22 1126 97 6 (215 2)        Body mass index is 33 39 kg/m²  BP      Temp      Pulse     Resp      SpO2        Vitals:    06/13/22 1126   Weight: 97 6 kg (215 lb 3 2 oz)     Vitals:    06/13/22 1126   Weight: 97 6 kg (215 lb 3 2 oz)       /82   Pulse 61   Resp 16   Ht 5' 7 32" (1 71 m)   Wt 97 6 kg (215 lb 3 2 oz)   LMP 04/18/2022 (Approximate)   SpO2 98%   BMI 33 39 kg/m²          Physical Exam  Constitutional:       Appearance: She is well-developed  HENT:      Head: Normocephalic  Eyes:      General: No scleral icterus  Right eye: No discharge  Left eye: No discharge  Conjunctiva/sclera: Conjunctivae normal       Pupils: Pupils are equal, round, and reactive to light  Cardiovascular:      Rate and Rhythm: Normal rate and regular rhythm  Heart sounds: Normal heart sounds  No murmur heard  No friction rub  No gallop  Pulmonary:      Effort: No respiratory distress  Breath sounds: Normal breath sounds  No wheezing or rales  Abdominal:      General: Bowel sounds are normal  There is no distension  Palpations: Abdomen is soft  There is no mass  Tenderness: There is no abdominal tenderness  There is no guarding or rebound  Musculoskeletal:         General: No deformity  Cervical back: Neck supple  Lymphadenopathy:      Cervical: No cervical adenopathy  Neurological:      Mental Status: She is alert        Coordination: Coordination normal        left hand index finger for flexion mild stiffness sensation is intact no joint effusion

## 2022-06-14 NOTE — ASSESSMENT & PLAN NOTE
Tingling of the finger status post laceration several months ago reports me improvements of the symptoms but stiffness of the D IP joint reports me he very cautionary after the injury secondary to pain ; currently there is some mild stiffness she would like to hold off on hand therapy but will do range-of-motion exercises if does not see improvement in the next 4 weeks please notify me for formal hand therapy consult

## 2022-06-14 NOTE — ASSESSMENT & PLAN NOTE
Symptoms did resolve after  the school year ended likely vocal cord strain at this point time the patient like to hold off on diagnostic evaluation she will monitor symptoms if there is any change or recurrence she will notify me have the time of her symptoms for further evaluation nonsmoker

## 2022-07-06 DIAGNOSIS — F41.9 ANXIETY: ICD-10-CM

## 2022-10-12 PROBLEM — Z13.6 SCREENING FOR CARDIOVASCULAR CONDITION: Status: RESOLVED | Noted: 2021-05-16 | Resolved: 2022-10-12

## 2022-10-12 PROBLEM — Z13.1 SCREENING FOR DIABETES MELLITUS: Status: RESOLVED | Noted: 2021-05-16 | Resolved: 2022-10-12

## 2022-10-12 PROBLEM — Z13.29 SCREENING FOR THYROID DISORDER: Status: RESOLVED | Noted: 2021-05-16 | Resolved: 2022-10-12

## 2022-12-20 ENCOUNTER — RA CDI HCC (OUTPATIENT)
Dept: OTHER | Facility: HOSPITAL | Age: 42
End: 2022-12-20

## 2022-12-20 NOTE — PROGRESS NOTES
[facility]  coding opportunities       Chart reviewed, no opportunity found: CHART REVIEWED, NO OPPORTUNITY FOUND        Patients Insurance        Commercial Insurance: [address] [place] [address]

## 2022-12-27 ENCOUNTER — OFFICE VISIT (OUTPATIENT)
Dept: INTERNAL MEDICINE CLINIC | Facility: CLINIC | Age: 42
End: 2022-12-27

## 2022-12-27 VITALS
DIASTOLIC BLOOD PRESSURE: 78 MMHG | HEART RATE: 68 BPM | BODY MASS INDEX: 34.03 KG/M2 | OXYGEN SATURATION: 98 % | WEIGHT: 216.8 LBS | SYSTOLIC BLOOD PRESSURE: 124 MMHG | HEIGHT: 67 IN

## 2022-12-27 DIAGNOSIS — R09.82 PND (POST-NASAL DRIP): Primary | ICD-10-CM

## 2022-12-27 DIAGNOSIS — Z00.00 HEALTHCARE MAINTENANCE: ICD-10-CM

## 2022-12-27 RX ORDER — FLUTICASONE PROPIONATE 50 MCG
2 SPRAY, SUSPENSION (ML) NASAL DAILY
Qty: 1 G | Refills: 0 | Status: SHIPPED | OUTPATIENT
Start: 2022-12-27

## 2022-12-27 NOTE — PROGRESS NOTES
ADULT ANNUAL PHYSICAL  [facility]    NAME: Consuelo Nayak  AGE: 38 y o  SEX: female  : 1980     DATE: 2022     Assessment and Plan:     Problem List Items Addressed This Visit        Other    PND (post-nasal drip) - Primary     Mild postnasal drip following a viral illness no fever no chills no sinus pain use Flonase 2 sprays each nostril once a day x10 days if symptoms not improved please notify me for consideration of ENT evaluation  Relevant Medications    fluticasone (FLONASE) 50 mcg/act nasal spray    Healthcare maintenance     Assessment and plan 1  Health maintenance annual wellness examination overall the patient is clinically stable and doing well, we encouraged the patient to follow a healthy and balanced diet  We recommend that the patient exercise routinely approximately 30 minutes 5 times per week   We have reviewed the patient's vaccines and have made recommendations for updates if necessary        We will be ordering screening laboratories which are age appropriate  Return to the office in   6 months   call if any problems  Relevant Orders    Ambulatory Referral to Obstetrics / Gynecology       Immunizations and preventive care screenings were discussed with patient today  Appropriate education was printed on patient's after visit summary  Counseling:  Exercise: the importance of regular exercise/physical activity was discussed  Recommend exercise 3-5 times per week for at least 30 minutes  Depression Screening and Follow-up Plan: Patient was screened for depression during today's encounter  They screened negative with a PHQ-2 score of 0  Return in about 6 months (around 2023)       Chief Complaint:     Chief Complaint   Patient presents with   • Physical Exam      History of Present Illness:     Adult Annual Physical   Patient here for a comprehensive physical exam  The patient reports reports to me a mild postnasal drip following a viral illness several weeks ago no sinus pain no ear pain no fever no chills clear mucus       Diet and Physical Activity  Diet/Nutrition: well balanced diet  Exercise: vigorous cardiovascular exercise  [place]     Depression Screening  PHQ-2/9 Depression Screening    Little interest or pleasure in doing things: 0 - not at all  Feeling down, depressed, or hopeless: 0 - not at all  PHQ-2 Score: 0  PHQ-2 Interpretation: Negative depression screen       General Health  Sleep: gets 7-8 hours of sleep on average  Hearing: normal - bilateral   Vision: goes for regular eye exams  Dental: regular dental visits  /GYN Health  Patient is: premenopausal  Last menstrual period: 12/24/22  Contraceptive method:  vasectomy     Review of Systems:     Review of Systems   Constitutional: Negative for activity change, appetite change and unexpected weight change  HENT: Negative for congestion and postnasal drip  Eyes: Negative for visual disturbance  Respiratory: Negative for cough and shortness of breath  Cardiovascular: Negative for chest pain  Gastrointestinal: Negative for abdominal pain, diarrhea, nausea and vomiting  Neurological: Negative for dizziness, light-headedness and headaches  Hematological: Negative for adenopathy        Past Medical History:     Past Medical History:   Diagnosis Date   • Anxiety     last assessed - [id number]   • Cancer ([facility] [address] ) 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   • Colon polyp    • Hyperlipidemia     last assessed - [id number]   • IBS (irritable bowel syndrome)    • Low grade mucinous neoplasm of appendix     last assessed - [id number]   • Nausea    • Normal delivery     2014 daughter   • Palpitations     last assessed - [id number]   • Right bundle branch block     last assessed - [id number]   • Thyroid cyst     last assessed - [id number]   • Vacuum extractor delivery, delivered     2012 daughter   • Varicella     childhood   • Vitamin D deficiency       Past Surgical History:     Past Surgical History:   Procedure Laterality Date   • APPENDECTOMY     • COLPOSCOPY W/ BIOPSY / CURETTAGE      Colposcopy Cervix with Biopsy(s)   • LACRIMAL DUCT PROBING      surgery of the Lacrimal system   • MD COLONOSCOPY FLX DX W/COLLJ SPEC WHEN PFRMD N/A 2/15/2016    Procedure: EGD AND COLONOSCOPY;  Surgeon: Jimbo Guidry DO;  Location:  GI LAB; Service: General   • SUBTOTAL COLECTOMY      Partial colectomy - with resection of appendix   • TEAR DUCT SURGERY     • TOOTH EXTRACTION      last assessed - [id number]   • TUMOR REMOVAL  12/2015    removed from appendix      Social History:     Social History     Socioeconomic History   • Marital status: /Civil Union     Spouse name: None   • Number of children: None   • Years of education: None   • Highest education level: None   Occupational History   • None   Tobacco Use   • Smoking status: Never   • Smokeless tobacco: Never   Vaping Use   • Vaping Use: Never used   Substance and Sexual Activity   • Alcohol use:  Yes   • Drug use: No   • Sexual activity: Yes     Partners: Male     Birth control/protection: Male Sterilization   Other Topics Concern   • None   Social History Narrative    Activities: Soccer    Always uses seat belt    Drinks coffee    Exercise: Running    Exercises moderately less than 3 times a week         Social Determinants of Health     Financial Resource Strain: Not on file   Food Insecurity: Not on file   Transportation Needs: Not on file   Physical Activity: Not on file   Stress: Not on file   Social Connections: Not on file   Intimate Partner Violence: Not on file   Housing Stability: Not on file      Family History:     Family History   Problem Relation Age of Onset   • Heart murmur Mother         type unspecified    • Cancer Mother         thyroid   • Thyroid cancer Mother 52   • Hyperlipidemia Father    • Hypertension Father    • Arthritis Maternal Grandmother    • Coronary artery disease Maternal Grandmother    • Transient ischemic attack Maternal Grandmother    • Cancer Maternal Grandmother         thyroid   • Hypertension Maternal Grandmother    • Stroke Maternal Grandmother    • Thyroid cancer Maternal Grandmother 52   • Depression Paternal Grandmother    • Cancer Maternal Aunt         lung   • Lung cancer Maternal Aunt 50   • Lake syndrome Paternal Aunt    • Cancer Paternal Aunt         lymphoma; unknown age   • Depression Sister    • Cancer Paternal Grandfather         bowel   • Heart disease Paternal Grandfather    • Hyperlipidemia Paternal Grandfather    • Hypertension Paternal Grandfather    • Colon cancer Paternal Grandfather 80   • Esophageal cancer Paternal Grandfather 80   • Anxiety disorder Paternal Grandfather    • Heart defect Maternal Aunt    • No Known Problems Daughter    • No Known Problems Maternal Grandfather    • No Known Problems Daughter    • No Known Problems Son    • No Known Problems Maternal Aunt    • No Known Problems Maternal Aunt       Current Medications:     Current Outpatient Medications   Medication Sig Dispense Refill   • Cholecalciferol (VITAMIN D) 2000 units CAPS Take 3,000 Units by mouth       • fluticasone (FLONASE) 50 mcg/act nasal spray 2 sprays into each nostril daily 1 g 0   • mometasone (ELOCON) 0 1 % cream APPLY TO SKIN TWICE DAILY FOR ONE WEEK, THEN AS NEEDED     • Multiple Vitamins-Minerals (MULTIVITAMIN GUMMIES ADULT PO) Take by mouth     • Polyethylene Glycol 3350 (MIRALAX PO) Take 1 packet by mouth daily as needed  • sertraline (ZOLOFT) 50 mg tablet TAKE 1 TABLET BY MOUTH EVERY DAY 90 tablet 2     No current facility-administered medications for this visit  Allergies:      Allergies   Allergen Reactions   • Seasonal Ic [Cholestatin] Allergic Rhinitis   • Amoxicillin Hives   • Augmentin [Amoxicillin-Pot Clavulanate] Hives   • Fluoxetine Other (See Comments)     Other reaction(s): bloating   • Penicillins Rash   • Wound Dressings Rash      Physical Exam:     /78   Pulse 68   Ht 5' 7 3" (1 709 m)   Wt 98 3 kg (216 lb 12 8 oz)   LMP 04/18/2022 (Approximate)   SpO2 98%   BMI 33 65 kg/m²     Physical Exam  Vitals and nursing note reviewed  Constitutional:       Appearance: She is well-developed  HENT:      Head: Normocephalic and atraumatic  Right Ear: External ear normal       Left Ear: External ear normal       Nose: Nose normal    Eyes:      Pupils: Pupils are equal, round, and reactive to light  Cardiovascular:      Rate and Rhythm: Normal rate and regular rhythm  Heart sounds: Normal heart sounds  No murmur heard  Pulmonary:      Effort: Pulmonary effort is normal       Breath sounds: Normal breath sounds  Abdominal:      General: There is no distension  Palpations: Abdomen is soft  Tenderness: There is no abdominal tenderness  There is no guarding            Duyen Fox DO  [facility]

## 2022-12-28 NOTE — ASSESSMENT & PLAN NOTE
Assessment and plan 1  Health maintenance annual wellness examination overall the patient is clinically stable and doing well, we encouraged the patient to follow a healthy and balanced diet  We recommend that the patient exercise routinely approximately 30 minutes 5 times per week   We have reviewed the patient's vaccines and have made recommendations for updates if necessary        We will be ordering screening laboratories which are age appropriate  Return to the office in   6 months   call if any problems

## 2022-12-28 NOTE — ASSESSMENT & PLAN NOTE
Mild postnasal drip following a viral illness no fever no chills no sinus pain use Flonase 2 sprays each nostril once a day x10 days if symptoms not improved please notify me for consideration of ENT evaluation

## 2023-01-25 ENCOUNTER — TELEPHONE (OUTPATIENT)
Dept: HEMATOLOGY ONCOLOGY | Facility: CLINIC | Age: 43
End: 2023-01-25

## 2023-01-25 NOTE — TELEPHONE ENCOUNTER
Scheduling Appointment SEND TO [facility]    Person calling In self     If other than patient calling, are they listed on the communication consent form? yes   Doctor Deep Ortega \[facility] [place] [facility]\""   Appointment date and time 1/24/2024 8Am   Reason for scheduling appointment Follow up   Patient verbalized understanding?  yes

## 2023-02-25 PROBLEM — Z00.00 HEALTHCARE MAINTENANCE: Status: RESOLVED | Noted: 2022-12-27 | Resolved: 2023-02-25

## 2023-04-26 ENCOUNTER — HOSPITAL ENCOUNTER (OUTPATIENT)
Dept: RADIOLOGY | Age: 43
Discharge: HOME | End: 2023-04-26

## 2023-04-26 VITALS — WEIGHT: 216 LBS | BODY MASS INDEX: 33.9 KG/M2 | HEIGHT: 67 IN

## 2023-04-26 DIAGNOSIS — Z12.31 ENCOUNTER FOR SCREENING MAMMOGRAM FOR MALIGNANT NEOPLASM OF BREAST: ICD-10-CM

## 2023-05-22 ENCOUNTER — ANNUAL EXAM (OUTPATIENT)
Dept: OBGYN CLINIC | Facility: CLINIC | Age: 43
End: 2023-05-22

## 2023-05-22 VITALS
DIASTOLIC BLOOD PRESSURE: 78 MMHG | BODY MASS INDEX: 34.44 KG/M2 | SYSTOLIC BLOOD PRESSURE: 124 MMHG | WEIGHT: 219.4 LBS | HEIGHT: 67 IN

## 2023-05-22 DIAGNOSIS — Z12.31 ENCOUNTER FOR SCREENING MAMMOGRAM FOR MALIGNANT NEOPLASM OF BREAST: ICD-10-CM

## 2023-05-22 DIAGNOSIS — Z01.419 ENCOUNTER FOR ANNUAL ROUTINE GYNECOLOGICAL EXAMINATION: Primary | ICD-10-CM

## 2023-05-22 DIAGNOSIS — N92.0 MENORRHAGIA WITH REGULAR CYCLE: ICD-10-CM

## 2023-05-22 NOTE — PROGRESS NOTES
Assessment/Plan:  Pap done as well as annual   Encourage self breast examination as well as calcium supplementation  Continue annual mammogram   Reviewed menstrual diary  Will check CBC with differential, ferritin and iron  Discussed treatment options for cycle control  At this point she like to remain conservative and will monitor her cycle  Reviewed all precautions  Return to office in 1 year or as needed  No problem-specific Assessment & Plan notes found for this encounter  Diagnoses and all orders for this visit:    Encounter for annual routine gynecological examination    Encounter for screening mammogram for malignant neoplasm of breast    Menorrhagia with regular cycle  -     CBC and differential  -     Ferritin  -     Iron          Subjective:      Patient ID: Luis A Simmons is a 41 y o  female  HPI     This is a pleasant 46-year-old female P3 ( x3, age 6, 6, 3) presents as a new patient to our office for GYN exam   She states her menstrual cycles are approximately every 4 weeks lasting 6 to 7 days  She denies any breakthrough bleeding  She states over the last year her cycles have gotten heavier  She denies any changes in bowel or bladder function  She has been in a monogamous relationship with her  for over 13 years  Pap smears have been normal   She did have a history of LEEP procedure in her 25s  She is sexually active, method of contraception vasectomy  Patient has a history of borderline appendiceal cancer diagnosed in   She was treated surgically  She now undergoes CT scans every 1 year  She has colonoscopies every 5 years      Colonoscopy 2021, GI    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, followed up with surgical oncology  The following portions of the patient's history were reviewed and updated as appropriate: allergies, current medications, past family history, past medical history, past social history, past surgical history and problem "list     Review of Systems   Constitutional: Negative for fatigue, fever and unexpected weight change  Respiratory: Negative for cough, chest tightness, shortness of breath and wheezing  Cardiovascular: Negative  Negative for chest pain and palpitations  Gastrointestinal: Negative  Negative for abdominal distention, abdominal pain, blood in stool, constipation, diarrhea, nausea and vomiting  Genitourinary: Negative  Negative for difficulty urinating, dyspareunia, dysuria, flank pain, frequency, genital sores, hematuria, pelvic pain, urgency, vaginal bleeding, vaginal discharge and vaginal pain  Skin: Negative for rash  Objective:      /78   Ht 5' 7 3\" (1 709 m)   Wt 99 5 kg (219 lb 6 4 oz)   LMP 05/11/2023 (Approximate)   BMI 34 06 kg/m²          Physical Exam  Constitutional:       Appearance: Normal appearance  She is well-developed  Cardiovascular:      Rate and Rhythm: Normal rate and regular rhythm  Pulmonary:      Effort: Pulmonary effort is normal       Breath sounds: Normal breath sounds  Chest:   Breasts:     Right: No inverted nipple, mass, nipple discharge, skin change or tenderness  Left: No inverted nipple, mass, nipple discharge, skin change or tenderness  Abdominal:      General: Bowel sounds are normal  There is no distension  Palpations: Abdomen is soft  Tenderness: There is no abdominal tenderness  There is no guarding or rebound  Genitourinary:     Labia:         Right: No rash, tenderness or lesion  Left: No rash, tenderness or lesion  Vagina: Normal  No signs of injury  No vaginal discharge or tenderness  Cervix: No cervical motion tenderness, discharge, friability, lesion, erythema or cervical bleeding  Uterus: Not enlarged and not tender  Adnexa:         Right: No mass, tenderness or fullness  Left: No mass, tenderness or fullness       Neurological:      Mental Status: She is alert and oriented to " person, place, and time     Psychiatric:         Behavior: Behavior normal

## 2023-05-24 LAB
HPV HR 12 DNA CVX QL NAA+PROBE: NEGATIVE
HPV16 DNA CVX QL NAA+PROBE: NEGATIVE
HPV18 DNA CVX QL NAA+PROBE: NEGATIVE

## 2023-05-26 LAB
LAB AP GYN PRIMARY INTERPRETATION: NORMAL
Lab: NORMAL

## 2023-06-29 ENCOUNTER — RA CDI HCC (OUTPATIENT)
Dept: OTHER | Facility: HOSPITAL | Age: 43
End: 2023-06-29

## 2023-06-29 NOTE — PROGRESS NOTES
"[facility]       Chart Reviewed number of suggestions sent to Provider: 1     Patients Insurance     Commercial Insurance: Chevy Guerrero       Refer to 1/7/22 Cancer care Oncology note excerpts as below -     \"   Diagnosis and Staging: Low-grade mucinous appendiceal neoplasm  No high-grade dysplasia  Margins negative Treatment History: Laparoscopic appendectomy December 2015   Current Therapy: Observation   Disease Status: MEGA \"    please review if this is a \"History of\" or \"resolved\" condition , see below guidelines and update the problem list as applicable    Per Coding guidelines:   If the primary site of the malignancy as previously excised or eradicated by treatment and the original primary site has not recurred and is no longer under treatment, code the previous primary site as \"personal (past) history of malignant neoplasm,\" using the appropriate subcategory code under Z85 [id number] [id number] Code any mention of current secondary sites  If the patient is still under active treatment for malignancy of primary site, either radiation or chemotherapy, retain the code for malignancy of primary site  The patient would not be receiving radiation therapy or chemotherapy directed at the primary site unless further treatments were needed       "

## 2023-06-30 ENCOUNTER — TELEPHONE (OUTPATIENT)
Dept: OTHER | Facility: OTHER | Age: 43
End: 2023-06-30

## 2023-06-30 NOTE — TELEPHONE ENCOUNTER
Concetta Caldera from [facility] states that pt has an apt on  with Dr Eloise Asencio but her lab orders  and she was there for blood work  Please place new orders in system

## 2023-07-03 ENCOUNTER — OFFICE VISIT (OUTPATIENT)
Dept: INTERNAL MEDICINE CLINIC | Facility: CLINIC | Age: 43
End: 2023-07-03
Payer: COMMERCIAL

## 2023-07-03 VITALS
HEART RATE: 61 BPM | RESPIRATION RATE: 16 BRPM | BODY MASS INDEX: 33.24 KG/M2 | OXYGEN SATURATION: 98 % | SYSTOLIC BLOOD PRESSURE: 124 MMHG | WEIGHT: 211.8 LBS | DIASTOLIC BLOOD PRESSURE: 70 MMHG | HEIGHT: 67 IN

## 2023-07-03 DIAGNOSIS — K21.9 GERD WITHOUT ESOPHAGITIS: ICD-10-CM

## 2023-07-03 DIAGNOSIS — E78.2 MODERATE MIXED HYPERLIPIDEMIA NOT REQUIRING STATIN THERAPY: ICD-10-CM

## 2023-07-03 DIAGNOSIS — Z80.8 FAMILY HISTORY OF THYROID CANCER: ICD-10-CM

## 2023-07-03 DIAGNOSIS — Z13.6 SCREENING FOR CARDIOVASCULAR CONDITION: Primary | ICD-10-CM

## 2023-07-03 DIAGNOSIS — C18.1 MALIGNANT TUMOR OF APPENDIX (HCC): ICD-10-CM

## 2023-07-03 DIAGNOSIS — F41.9 ANXIETY: ICD-10-CM

## 2023-07-03 DIAGNOSIS — E66.09 CLASS 1 OBESITY DUE TO EXCESS CALORIES WITHOUT SERIOUS COMORBIDITY WITH BODY MASS INDEX (BMI) OF 30.0 TO 30.9 IN ADULT: Primary | ICD-10-CM

## 2023-07-03 DIAGNOSIS — Z13.29 SCREENING FOR THYROID DISORDER: ICD-10-CM

## 2023-07-03 PROCEDURE — 99214 OFFICE O/P EST MOD 30 MIN: CPT | Performed by: INTERNAL MEDICINE

## 2023-07-03 RX ORDER — SERTRALINE HYDROCHLORIDE 25 MG/1
25 TABLET, FILM COATED ORAL DAILY
Qty: 90 TABLET | Refills: 3 | Status: SHIPPED | OUTPATIENT
Start: 2023-07-03 | End: 2023-10-01

## 2023-07-03 NOTE — PROGRESS NOTES
Assessment/Plan:    GERD without esophagitis  Mild intermittent symptoms clinically stable and doing well continue the current medical regiment will continue monitor. She reports me when she will follow-up with GI Dr. 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 Being monitored with surveillance CAT scan surgical oncology Dr. Lashanda Riojas  Patient reports me increased anxiety levels she is started on a higher level of Zoloft 50 mg once daily but developed nauseous this therefore discontinued the past she has tolerated this medication very well I have instructed her to start Zoloft 25 mg once daily if any GI upset she may take half a tablet once a day for for several weeks if tolerating she may increase to a whole tablet once daily we will check third-generation TSH no SI    Class 1 obesity due to excess calories without serious comorbidity with body mass index (BMI) of 30.0 to 30.9 in adult  Obesity -I have counseled patient following healthy and balanced diet, I would like the patient to lose weight, I would like the patient exercise routinely; we will continue monitor the patient's progress. Family history of thyroid cancer  Check third-generation TSH    Hyperlipidemia  Low-cholesterol diet check lipid profile         Problem List Items Addressed This Visit        Digestive    GERD without esophagitis     Mild intermittent symptoms clinically stable and doing well continue the current medical regiment will continue monitor.   She reports me when she will follow-up with GI Dr. 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 Being monitored with surveillance CAT scan surgical oncology Dr. Andrea Daigle            Other    Anxiety     Patient reports me increased anxiety levels she is started on a higher level of Zoloft 50 mg once daily but developed nauseous this therefore discontinued the past she has tolerated this medication very well I have instructed her to start Zoloft 25 mg once daily if any GI upset she may take half a tablet once a day for for several weeks if tolerating she may increase to a whole tablet once daily we will check third-generation TSH no SI         Relevant Medications    sertraline (ZOLOFT) 25 mg tablet    Hyperlipidemia     Low-cholesterol diet check lipid profile         Class 1 obesity due to excess calories without serious comorbidity with body mass index (BMI) of 30.0 to 30.9 in adult - Primary     Obesity -I have counseled patient following healthy and balanced diet, I would like the patient to lose weight, I would like the patient exercise routinely; we will continue monitor the patient's progress. Family history of thyroid cancer     Check third-generation TSH         Relevant Orders    TSH, 3rd generation     thyroid    Screening for thyroid disorder    Relevant Orders    TSH, 3rd generation       Return to office 6   months  call if any problems  Subjective:      Patient ID: Rito Krishnamurthy is a 41 y.o. female. HPI 44-year old female coming in for a follow up office visit regarding obesity, anxiety, hyperlipidemia, malignant tumor of the appendix, GERD; the patient reports me compliant taking medications without untoward side effects the. The patient is here to review his medical condition, update me on the medical condition and the patient reports me no hospitalizations and no ER visits.   No injuries no illnesses overall doing very well she reports me increased anxiety, regarding father's health concerns side and recent need for surgery, patient will be changing her position at work she had been off Zoloft for some time. She tolerated in the past with these increased symptoms of anxiety she restarted Zoloft at 52 and shortly after started developing nauseous next to the point she needed to discontinue the pt reports fullness, burping , was off zoloft , dad with shaista, son tonsilllectomy, going to [employer], dumb/crazy dreams, sx resolved with stopping zoloft, slight irritated stomache with chocolate,  No dysphagia , no blood in stool,  No noctural ,  No retrosternal     The following portions of the patient's history were reviewed and updated as appropriate: allergies, current medications, past family history, past medical history, past social history, past surgical history and problem list.    Review of Systems   Constitutional: Negative for activity change, appetite change and unexpected weight change. HENT: Negative for congestion and postnasal drip. Eyes: Negative for visual disturbance. Respiratory: Negative for cough and shortness of breath. Cardiovascular: Negative for chest pain. Gastrointestinal: Negative for abdominal pain, diarrhea, nausea and vomiting. Neurological: Negative for dizziness, light-headedness and headaches. Psychiatric/Behavioral: Negative for suicidal ideas. The patient is nervous/anxious. Objective:    Return in about 6 months (around 1/3/2024). No results found.       Allergies   Allergen Reactions   • Seasonal Ic [Cholestatin] Allergic Rhinitis   • Amoxicillin Hives   • Augmentin [Amoxicillin-Pot Clavulanate] Hives   • Fluoxetine Other (See Comments)     Other reaction(s): bloating   • Penicillins Rash   • Wound Dressings Rash       Past Medical History:   Diagnosis Date   • Anxiety     last assessed - [id number]   • Cancer ([address]) 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   • Colon polyp    • Hyperlipidemia     last assessed - [id number]   • IBS (irritable bowel syndrome)    • Low grade mucinous neoplasm of appendix     last assessed - [id number]   • Nausea    • Normal delivery     2014 daughter   • Palpitations     last assessed - [id number]   • Right bundle branch block     last assessed - [id number]   • Thyroid cyst     last assessed - [id number]   • Vacuum extractor delivery, delivered     2012 daughter   • Varicella     childhood   • Vitamin D deficiency      Past Surgical History:   Procedure Laterality Date   • APPENDECTOMY     • COLPOSCOPY W/ BIOPSY / CURETTAGE      Colposcopy Cervix with Biopsy(s)   • LACRIMAL DUCT PROBING      surgery of the Lacrimal system   • [place] COLONOSCOPY FLX DX W/COLLJ SPEC WHEN PFRMD N/A 2/15/2016    Procedure: EGD AND COLONOSCOPY;  Surgeon: Sinclair Lanes, DO;  Location: [facility]; Service: General   • SUBTOTAL COLECTOMY      Partial colectomy - with resection of appendix   • TEAR DUCT SURGERY     • TOOTH EXTRACTION      last assessed - [id number]   • TUMOR REMOVAL  12/2015    removed from appendix     Current Outpatient Medications on File Prior to Visit   Medication Sig Dispense Refill   • Cholecalciferol (VITAMIN D) 2000 units CAPS Take 3,000 Units by mouth       • fluticasone (FLONASE) 50 mcg/act nasal spray 2 sprays into each nostril daily 1 g 0   • Multiple Vitamins-Minerals (MULTIVITAMIN GUMMIES ADULT PO) Take by mouth     • Polyethylene Glycol 3350 (MIRALAX PO) Take 1 packet by mouth daily as needed. • mometasone (ELOCON) 0.1 % cream APPLY TO SKIN TWICE DAILY FOR ONE WEEK, THEN AS NEEDED (Patient not taking: Reported on 5/22/2023)     • sertraline (ZOLOFT) 50 mg tablet TAKE 1 TABLET BY MOUTH EVERY DAY (Patient not taking: Reported on 7/3/2023) 90 tablet 2     No current facility-administered medications on file prior to visit.      Family History   Problem Relation Age of Onset   • Heart murmur Mother         type unspecified    • Cancer Mother         thyroid   • Thyroid cancer Mother 52   • Hyperlipidemia Father    • Hypertension Father    • Esophageal cancer Father    • Depression Sister    • No Known Problems Daughter    • No Known Problems Daughter    • No Known Problems Son    • Arthritis Maternal Grandmother    • Coronary artery disease Maternal Grandmother    • Transient ischemic attack Maternal Grandmother    • Cancer Maternal Grandmother         thyroid   • Hypertension Maternal Grandmother    • Stroke Maternal Grandmother    • Thyroid cancer Maternal Grandmother 52   • No Known Problems Maternal Grandfather    • Depression Paternal Grandmother    • Cancer Paternal Grandfather         bowel   • Heart disease Paternal Grandfather    • Hyperlipidemia Paternal Grandfather    • Hypertension Paternal Grandfather    • Colon cancer Paternal Grandfather 80   • Esophageal cancer Paternal Grandfather 80   • Anxiety disorder Paternal Grandfather    • Cancer Maternal Aunt         lung   • Lung cancer Maternal Aunt 50   • Heart defect Maternal Aunt    • No Known Problems Maternal Aunt    • No Known Problems Maternal Aunt    • Lake syndrome Paternal Aunt    • Cancer Paternal Aunt         lymphoma; unknown age   • Lymphoma Paternal Aunt      Social History     Socioeconomic History   • Marital status: /Civil Union     Spouse name: Not on file   • Number of children: Not on file   • Years of education: Not on file   • Highest education level: Not on file   Occupational History   • Not on file   Tobacco Use   • Smoking status: Never   • Smokeless tobacco: Never   Vaping Use   • Vaping Use: Never used   Substance and Sexual Activity   • Alcohol use: Yes     Comment: soc   • Drug use: No   • Sexual activity: Yes     Partners: Male     Birth control/protection: Male Sterilization   Other Topics Concern   • Not on file   Social History Narrative    Activities: Soccer    Always uses seat belt    Drinks coffee    Exercise: Running    Exercises moderately less than 3 times a week         Social Determinants of Health     Financial Resource Strain: Not on file   Food Insecurity: Not on file   Transportation Needs: Not on file   Physical Activity: Not on file   Stress: Not on file   Social Connections: Not on file   Intimate Partner Violence: Not on file   Housing Stability: Not on file     Vitals:    07/03/23 1034   BP: 124/70   Pulse: 61   Resp: 16   SpO2: 98%   Weight: 96.1 kg (211 lb 12.8 oz)   Height: 5' 7.3" (1.709 m)     Results for orders placed or performed in visit on 05/22/23   HPV High Risk    Specimen: Cervix; Thin-Prep Vial   Result Value Ref Range    HPV Other HR Negative Negative    HPV16 Negative Negative    HPV18 Negative Negative   Liquid-based pap, screening   Result Value Ref Range    Case Report       Gynecologic Cytology Report                       Case: [id number]                                  Authorizing Provider:  Cassie Flynn DO       Collected:           05/22/2023 1540              Ordering Location:     [facility]      Received:            05/22/2023 1541              First Screen:          Mario Gamma, CT                                                       Specimen:    LIQUID-BASED PAP, SCREENING, Cervix                                                        Primary Interpretation Negative for intraepithelial lesion or malignancy     Specimen Adequacy       Satisfactory for evaluation. Endocervical/transformation zone component present. Additional Information       [employer]'s FDA approved ,  and ThinPrep Imaging Duo System are utilized with strict adherence to the 's instruction manual to prepare gynecologic and non-gynecologic cytology specimens for the production of ThinPrep slides as well as for gynecologic ThinPrep imaging. These processes have been validated by our laboratory and/or by the . The Pap test is not a diagnostic procedure and should not be used as the sole means to detect cervical cancer.  It is only a screening procedure to aid in the detection of cervical cancer and its precursors. Both false-negative and false-positive results have been experienced. Your patient's test result should be interpreted in this context together with the history and clinical findings. Weight (last 2 days)     Date/Time Weight    07/03/23 1034 96.1 (211.8)        Body mass index is 32.88 kg/m². BP      Temp      Pulse     Resp      SpO2        Vitals:    07/03/23 1034   Weight: 96.1 kg (211 lb 12.8 oz)     Vitals:    07/03/23 1034   Weight: 96.1 kg (211 lb 12.8 oz)       /70   Pulse 61   Resp 16   Ht 5' 7.3" (1.709 m)   Wt 96.1 kg (211 lb 12.8 oz)   SpO2 98%   BMI 32.88 kg/m²          Physical Exam  Vitals and nursing note reviewed. Constitutional:       General: She is not in acute distress. Appearance: Normal appearance. She is well-developed. She is obese. She is not ill-appearing, toxic-appearing or diaphoretic. HENT:      Head: Normocephalic. Eyes:      General: No scleral icterus. Right eye: No discharge. Left eye: No discharge. Conjunctiva/sclera: Conjunctivae normal.      Pupils: Pupils are equal, round, and reactive to light. Cardiovascular:      Rate and Rhythm: Normal rate and regular rhythm. Heart sounds: Normal heart sounds. No murmur heard. No friction rub. No gallop. Pulmonary:      Effort: No respiratory distress. Breath sounds: Normal breath sounds. No wheezing or rales. Abdominal:      General: Bowel sounds are normal. There is no distension. Palpations: Abdomen is soft. There is no mass. Tenderness: There is no abdominal tenderness. There is no guarding or rebound. Musculoskeletal:         General: No deformity. Cervical back: Neck supple. Lymphadenopathy:      Cervical: No cervical adenopathy. Neurological:      Mental Status: She is alert. Coordination: Coordination normal.   Psychiatric:         Mood and Affect: Mood is anxious. Mood is not depressed. Thought Content:  Thought content does not include suicidal ideation.

## 2023-07-04 PROBLEM — Z13.29 SCREENING FOR THYROID DISORDER: Status: ACTIVE | Noted: 2023-07-04

## 2023-07-04 NOTE — ASSESSMENT & PLAN NOTE
Patient reports me increased anxiety levels she is started on a higher level of Zoloft 50 mg once daily but developed nauseous this therefore discontinued the past she has tolerated this medication very well I have instructed her to start Zoloft 25 mg once daily if any GI upset she may take half a tablet once a day for for several weeks if tolerating she may increase to a whole tablet once daily we will check third-generation TSH no SI

## 2023-07-04 NOTE — ASSESSMENT & PLAN NOTE
Status post partial colectomy clinically stable and doing well continue the current medical regiment will continue monitor.   Being monitored with surveillance CAT scan surgical oncology Dr. Andry Jacobs

## 2023-07-04 NOTE — ASSESSMENT & PLAN NOTE
Mild intermittent symptoms clinically stable and doing well continue the current medical regiment will continue monitor.   She reports me when she will follow-up with GI Dr. Bruce Senior she will discuss further the need for EGD with her next follow-up colonoscopy, ulcer free diet, weight loss

## 2023-07-04 NOTE — ASSESSMENT & PLAN NOTE
Obesity -I have counseled patient following healthy and balanced diet, I would like the patient to lose weight, I would like the patient exercise routinely; we will continue monitor the patient's progress.

## 2023-09-02 PROBLEM — Z13.29 SCREENING FOR THYROID DISORDER: Status: RESOLVED | Noted: 2023-07-04 | Resolved: 2023-09-02

## 2023-09-04 ENCOUNTER — AMB VIDEO VISIT (OUTPATIENT)
Dept: OTHER | Facility: HOSPITAL | Age: 43
End: 2023-09-04
Payer: COMMERCIAL

## 2023-09-04 VITALS — RESPIRATION RATE: 18 BRPM | TEMPERATURE: 100.6 F

## 2023-09-04 PROCEDURE — 99212 OFFICE O/P EST SF 10 MIN: CPT | Performed by: NURSE PRACTITIONER

## 2023-09-04 NOTE — LETTER
September 4, 2023     Stu Claire    Patient: Stu Claire   YOB: 1980   Date of Visit: 9/4/2023 September 4, 2023     Patient: Stu Claire   YOB: 1980   Date of Visit: 9/4/2023       To Whom it May Concern:    Irina Luna is under my professional care. She was seen virtually on 9/4/2023. She way return to work on 9/08/2023. If you have any questions or concerns, please don't hesitate to call.          Sincerely,          LACI Mayers        CC: No Recipients

## 2023-09-04 NOTE — PATIENT INSTRUCTIONS
Home covid test positive. Rest and drink extra fluids. Tylenol as needed for pain. Go to ER with any worsening symptoms, chest pain, sob, difficulty breathing, lethargy, confusion, dehyrdration, persistent fever 103 or higher. Please Be Courteous:  You are being tested for novel coronavirus (COVID-19) your test is pending at this time. You need to self quarantine at least until you have the results back. This means go home and stay home. Have someone else  your medications for you and bring them to you and drop them off at your door. Tests typically come back in 1-3 days. Results can be found in the "COVID-19" section of your [facility] account. In Your Home:  If you live with other people, trying to avoid common spaces and disinfect areas that you come into contact with. Per the CDC's recommendations: persons who suspect that they might have COVID-19 should isolate, stay at home, and use a separate bedroom and bathroom if feasible. Isolation should begin even before seeking testing and before test results become available. All household members should start wearing a mask in the home, particularly in shared spaces where appropriate distancing is not possible. Take Care of Yourself:  Try to sleep on your stomach as much as possible. If you have the ability to, take vitamin D3 2000 IU by mouth daily, vitamin-C 1000 mg by mouth twice a day, a multivitamin daily to help boost your immune system. You should check your temperature twice day. Return to the emergency department for any severe shortness of breath or pulse ox less than 90%. If You Test Positive for COVID-19 (Isolate)     Everyone, regardless of vaccination status:     Stay home for 5 days. If you have no symptoms or your symptoms are resolving after 5 days, you can leave your house. Continue to wear a mask around others for 5 additional days. If you have a fever, continue to stay home until your fever resolves.      If You Were Exposed to Someone with COVID-19 (Quarantine)  If you:  Have been boosted  OR  Completed the primary series of Pfizer or Moderna vaccine within the last 6 months  OR  Completed the primary series of J&J vaccine within the last 2 months     Wear a mask around others for 10 days. Test on day 5, if possible. If you develop symptoms get a test and stay home. If you:  Completed the primary series of Pfizer or Moderna vaccine over 6 months ago and are not boosted  OR  Completed the primary series of J&J over 2 months ago and are not boosted  OR  Are unvaccinated     Stay home for 5 days. After that continue to wear a mask around others for 5 additional days. If you can’t quarantine you must wear a mask for 10 days. Test on day 5 if possible.      If you develop symptoms get a test and stay home

## 2023-09-04 NOTE — CARE ANYWHERE EVISITS
Visit Summary for Cara Reynoso - Gender: Female - Date of Birth: [id number]  Date: [id number] - Duration: 8 minutes  Patient: Cara Reynoso  Provider: Zofia AGUERO    Patient Contact Information  Address  Regina POSADAS; [address] [place] [address]  [phone]    Visit Topics  Covid [Added Racheal Kaitlins - 2023-09-04]    Triage Questions   What is your current physical address in the event of a medical emergency? Answer []  Are you allergic to any medications? Answer []  Are you now or could you be pregnant? Answer []  Do you have any immune system compromise or chronic lung   disease? Answer []  Do you have any vulnerable family members in the home (infant, pregnant, cancer, elderly)? Answer []     Conversation Transcripts  [0A][0A] [Notification] You are connected with Mario Craft, Urgent Care Specialist.[0A][Notification] Cara Reynoso is located in [facility]. [0A][Notification] Cara Reynoso has shared health history. Jesus Harris .[0A]    Diagnosis  COVID-19    Procedures  Value: [id number] Code: CPT-4 UNLISTED E&M SERVICE    Medications Prescribed    No prescriptions ordered    Electronically signed by: Mario Riojas(NPI [id number])

## 2023-09-04 NOTE — PROGRESS NOTES
Video Visit - Alin Hoffman 41 y.o. female MRN: [id number]    REQUIRED DOCUMENTATION:         1. This service was provided via [employer]. 2. Provider located at [facility] [address]  [phone].  3. [facility] provider: Maranda Shah, [facility]. 4. Identify all parties in room with patient during [facility] visit:  patient   5. After connecting through [employer], patient was identified by name and date of birth. Patient was then informed that this was a Telemedicine visit and that the exam was being conducted confidentially over secure lines. My office door was closed. No one else was in the room. Patient acknowledged consent and understanding of privacy and security of the Telemedicine visit. I informed the patient that I have reviewed their record in Epic and presented the opportunity for them to ask any questions regarding the visit today. The patient agreed to participate. This is a 41year old female here today for video visit. Son and  tested positive for covid about 10 days ago. She started last night fever and congestion 2 days ago. She states she now tested positive for covid. She does have a slight cough and drainage. No sob or chest pain. Tmax 100.5. She has not taken any thing for fever. NO other symptoms. Review of Systems   Constitutional: Positive for fatigue and fever. Negative for activity change and chills. HENT: Positive for congestion and rhinorrhea. Respiratory: Positive for cough. Negative for shortness of breath and wheezing. Cardiovascular: Negative. Skin: Negative. Neurological: Negative. Psychiatric/Behavioral: Negative. Vitals:    09/04/23 1324   Resp: 18   Temp: (!) 100.6 °F (38.1 °C)     Physical Exam  Constitutional:       General: She is not in acute distress. Appearance: Normal appearance. She is not ill-appearing or toxic-appearing. HENT:      Head: Normocephalic and atraumatic. Nose: Congestion present. Eyes:      Conjunctiva/sclera: Conjunctivae normal.   Pulmonary:      Effort: Pulmonary effort is normal. No respiratory distress. Skin:     Comments: No rash on head or neck. Neurological:      Mental Status: She is alert and oriented to person, place, and time. Psychiatric:         Mood and Affect: Mood normal.         Behavior: Behavior normal.         Thought Content: Thought content normal.         Judgment: Judgment normal.       There are no diagnoses linked to this encounter. Patient Instructions   Home covid test positive. Rest and drink extra fluids. Tylenol as needed for pain. Go to ER with any worsening symptoms, chest pain, sob, difficulty breathing, lethargy, confusion, dehyrdration, persistent fever 103 or higher. Please Be Courteous:  You are being tested for novel coronavirus (COVID-19) your test is pending at this time. You need to self quarantine at least until you have the results back. This means go home and stay home. Have someone else  your medications for you and bring them to you and drop them off at your door. Tests typically come back in 1-3 days. Results can be found in the "COVID-19" section of your [employer] account. In Your Home:  If you live with other people, trying to avoid common spaces and disinfect areas that you come into contact with. Per the CDC's recommendations: persons who suspect that they might have COVID-19 should isolate, stay at home, and use a separate bedroom and bathroom if feasible. Isolation should begin even before seeking testing and before test results become available. All household members should start wearing a mask in the home, particularly in shared spaces where appropriate distancing is not possible. Take Care of Yourself:  Try to sleep on your stomach as much as possible.   If you have the ability to, take vitamin D3 2000 IU by mouth daily, vitamin-C 1000 mg by mouth twice a day, a multivitamin daily to help boost your immune system. You should check your temperature twice day. Return to the emergency department for any severe shortness of breath or pulse ox less than 90%. If You Test Positive for COVID-19 (Isolate)     Everyone, regardless of vaccination status:     · Stay home for 5 days. · If you have no symptoms or your symptoms are resolving after 5 days, you can leave your house. · Continue to wear a mask around others for 5 additional days. If you have a fever, continue to stay home until your fever resolves. If You Were Exposed to Someone with COVID-19 (Quarantine)  If you:  Have been boosted  OR  Completed the primary series of Pfizer or Moderna vaccine within the last 6 months  OR  Completed the primary series of J&J vaccine within the last 2 months     · Wear a mask around others for 10 days. · Test on day 5, if possible. If you develop symptoms get a test and stay home. If you:  Completed the primary series of Pfizer or Moderna vaccine over 6 months ago and are not boosted  OR  Completed the primary series of J&J over 2 months ago and are not boosted  OR  Are unvaccinated     · Stay home for 5 days. After that continue to wear a mask around others for 5 additional days. · If you can’t quarantine you must wear a mask for 10 days. · Test on day 5 if possible. If you develop symptoms get a test and stay home           Follow up with PCP if not improved, if symptoms are worse, go to the ER.

## 2023-09-25 ENCOUNTER — OFFICE VISIT (OUTPATIENT)
Dept: INTERNAL MEDICINE CLINIC | Facility: CLINIC | Age: 43
End: 2023-09-25
Payer: COMMERCIAL

## 2023-09-25 VITALS
SYSTOLIC BLOOD PRESSURE: 120 MMHG | DIASTOLIC BLOOD PRESSURE: 74 MMHG | WEIGHT: 217 LBS | BODY MASS INDEX: 34.06 KG/M2 | HEIGHT: 67 IN | OXYGEN SATURATION: 98 % | HEART RATE: 96 BPM

## 2023-09-25 DIAGNOSIS — J20.9 ACUTE BRONCHITIS, UNSPECIFIED ORGANISM: Primary | ICD-10-CM

## 2023-09-25 PROCEDURE — 99213 OFFICE O/P EST LOW 20 MIN: CPT | Performed by: INTERNAL MEDICINE

## 2023-09-25 RX ORDER — GUAIFENESIN AND CODEINE PHOSPHATE 100; 10 MG/5ML; MG/5ML
5 SOLUTION ORAL
Qty: 180 ML | Refills: 0 | Status: SHIPPED | OUTPATIENT
Start: 2023-09-25

## 2023-09-25 RX ORDER — ALBUTEROL SULFATE 90 UG/1
2 AEROSOL, METERED RESPIRATORY (INHALATION) EVERY 6 HOURS PRN
Qty: 8.5 G | Refills: 0 | Status: SHIPPED | OUTPATIENT
Start: 2023-09-25

## 2023-09-25 NOTE — PATIENT INSTRUCTIONS
-For cough take Delsym   -For post nasal drip take Flonase daily or Astepro 1-2 sprays twice a day   -Use an Albuterol 4 times a day as needed   -Use Codeine cough syrup at night for cough

## 2023-09-25 NOTE — PROGRESS NOTES
Name: Clara Wise      : 1980      MRN: [id number]  Encounter Provider: Kaila Dill MD  Encounter Date: 2023   Encounter department: [facility] [address]. Acute bronchitis, unspecified organism  -     albuterol (ProAir HFA) 90 mcg/act inhaler; Inhale 2 puffs every 6 (six) hours as needed for wheezing  -     guaifenesin-codeine (GUAIFENESIN AC) 100-10 MG/5ML liquid; Take 5 mL by mouth daily at bedtime as needed for cough    -For cough the patient was instructed to take Delsym  -For nighttime cough she has been given a prescription for guaifenesin AC  -A prescription has been sent to her pharmacy for albuterol 4 times a day as needed for paroxysmal cough  -For her postnasal drip she may either use Flonase daily or Astepro 1 to 2 sprays twice a day       Subjective      HPI  Patient presents today as an acute visit. She complains of persistent cough over the past 10 days. She reports at the beginning of the month she tested positive for COVID but that her symptoms resolved after about a week. Several days later she developed recurrent cough. In general she states she feels well and denies any fatigue, fevers or chills. She reports she was recently seen in urgent care and that Virgene Showman were recommended however she states they have been ineffective. She also endorses postnasal drip. All other systems negative except for pertinent findings noted in HPI. Current Outpatient Medications on File Prior to Visit   Medication Sig   • Cholecalciferol (VITAMIN D) 2000 units CAPS Take 3,000 Units by mouth     • fluticasone (FLONASE) 50 mcg/act nasal spray 2 sprays into each nostril daily   • Multiple Vitamins-Minerals (MULTIVITAMIN GUMMIES ADULT PO) Take by mouth   • Polyethylene Glycol 3350 (MIRALAX PO) Take 1 packet by mouth daily as needed.    • sertraline (ZOLOFT) 25 mg tablet Take 1 tablet (25 mg total) by mouth daily   • mometasone (ELOCON) 0.1 % cream APPLY TO SKIN TWICE DAILY FOR ONE WEEK, THEN AS NEEDED (Patient not taking: Reported on 5/22/2023)   • sertraline (ZOLOFT) 50 mg tablet TAKE 1 TABLET BY MOUTH EVERY DAY (Patient not taking: Reported on 7/3/2023)       Objective     /74   Pulse 96   Ht 5' 7" (1.702 m)   Wt 98.4 kg (217 lb)   SpO2 98%   BMI 33.99 kg/m²     BP Readings from Last 3 Encounters:   09/25/23 120/74   07/03/23 124/70   05/22/23 124/78        Wt Readings from Last 3 Encounters:   09/25/23 98.4 kg (217 lb)   07/03/23 96.1 kg (211 lb 12.8 oz)   05/22/23 99.5 kg (219 lb 6.4 oz)       Physical Exam    General: NAD, Alert and oriented x3   HEENT: NCAT, EOMI, normal conjunctiva  Cardiovascular: RRR, normal S1 and S2, no m/r/g  Pulmonary: Normal respiratory effort, no wheezes,  bilateral rhonchi  Extremities: No lower extremity edema  Skin: Normal skin color, no rashes     Benji Doll MD

## 2023-10-17 DIAGNOSIS — J20.9 ACUTE BRONCHITIS, UNSPECIFIED ORGANISM: ICD-10-CM

## 2023-10-17 RX ORDER — ALBUTEROL SULFATE 90 UG/1
2 AEROSOL, METERED RESPIRATORY (INHALATION) EVERY 6 HOURS PRN
Qty: 18 G | Refills: 3 | Status: SHIPPED | OUTPATIENT
Start: 2023-10-17

## 2024-01-11 ENCOUNTER — TELEPHONE (OUTPATIENT)
Age: 44
End: 2024-01-11

## 2024-01-11 ENCOUNTER — LAB (OUTPATIENT)
Dept: LAB | Facility: CLINIC | Age: 44
End: 2024-01-11
Payer: COMMERCIAL

## 2024-01-11 ENCOUNTER — TELEPHONE (OUTPATIENT)
Dept: INTERNAL MEDICINE CLINIC | Facility: CLINIC | Age: 44
End: 2024-01-11

## 2024-01-11 DIAGNOSIS — Z80.8 FAMILY HISTORY OF THYROID CANCER: ICD-10-CM

## 2024-01-11 DIAGNOSIS — Z13.29 SCREENING FOR THYROID DISORDER: ICD-10-CM

## 2024-01-11 DIAGNOSIS — Z13.6 SCREENING FOR CARDIOVASCULAR CONDITION: ICD-10-CM

## 2024-01-11 DIAGNOSIS — Z85.09 HISTORY OF MALIGNANT NEOPLASM OF APPENDIX: ICD-10-CM

## 2024-01-11 LAB
ALBUMIN SERPL BCP-MCNC: 4.4 G/DL (ref 3.5–5)
ALP SERPL-CCNC: 62 U/L (ref 34–104)
ALT SERPL W P-5'-P-CCNC: 16 U/L (ref 7–52)
ANION GAP SERPL CALCULATED.3IONS-SCNC: 7 MMOL/L
AST SERPL W P-5'-P-CCNC: 14 U/L (ref 13–39)
BASOPHILS # BLD AUTO: 0.01 THOUSANDS/ÂΜL (ref 0–0.1)
BASOPHILS NFR BLD AUTO: 0 % (ref 0–1)
BILIRUB SERPL-MCNC: 1 MG/DL (ref 0.2–1)
BUN SERPL-MCNC: 10 MG/DL (ref 5–25)
CALCIUM SERPL-MCNC: 9.2 MG/DL (ref 8.4–10.2)
CEA SERPL-MCNC: 0.4 NG/ML (ref 0–3)
CHLORIDE SERPL-SCNC: 105 MMOL/L (ref 96–108)
CHOLEST SERPL-MCNC: 213 MG/DL
CO2 SERPL-SCNC: 27 MMOL/L (ref 21–32)
CREAT SERPL-MCNC: 0.73 MG/DL (ref 0.6–1.3)
EOSINOPHIL # BLD AUTO: 0.08 THOUSAND/ÂΜL (ref 0–0.61)
EOSINOPHIL NFR BLD AUTO: 2 % (ref 0–6)
ERYTHROCYTE [DISTWIDTH] IN BLOOD BY AUTOMATED COUNT: 12.5 % (ref 11.6–15.1)
FERRITIN SERPL-MCNC: 14 NG/ML (ref 11–307)
GFR SERPL CREATININE-BSD FRML MDRD: 102 ML/MIN/1.73SQ M
GLUCOSE P FAST SERPL-MCNC: 87 MG/DL (ref 65–99)
HCT VFR BLD AUTO: 39.3 % (ref 34.8–46.1)
HDLC SERPL-MCNC: 47 MG/DL
HGB BLD-MCNC: 12.9 G/DL (ref 11.5–15.4)
IMM GRANULOCYTES # BLD AUTO: 0.01 THOUSAND/UL (ref 0–0.2)
IMM GRANULOCYTES NFR BLD AUTO: 0 % (ref 0–2)
IRON SERPL-MCNC: 119 UG/DL (ref 50–212)
LDLC SERPL CALC-MCNC: 148 MG/DL (ref 0–100)
LYMPHOCYTES # BLD AUTO: 1.64 THOUSANDS/ÂΜL (ref 0.6–4.47)
LYMPHOCYTES NFR BLD AUTO: 31 % (ref 14–44)
MCH RBC QN AUTO: 29.5 PG (ref 26.8–34.3)
MCHC RBC AUTO-ENTMCNC: 32.8 G/DL (ref 31.4–37.4)
MCV RBC AUTO: 90 FL (ref 82–98)
MONOCYTES # BLD AUTO: 0.32 THOUSAND/ÂΜL (ref 0.17–1.22)
MONOCYTES NFR BLD AUTO: 6 % (ref 4–12)
NEUTROPHILS # BLD AUTO: 3.23 THOUSANDS/ÂΜL (ref 1.85–7.62)
NEUTS SEG NFR BLD AUTO: 61 % (ref 43–75)
NRBC BLD AUTO-RTO: 0 /100 WBCS
PLATELET # BLD AUTO: 232 THOUSANDS/UL (ref 149–390)
PMV BLD AUTO: 10.7 FL (ref 8.9–12.7)
POTASSIUM SERPL-SCNC: 3.9 MMOL/L (ref 3.5–5.3)
PROT SERPL-MCNC: 6.5 G/DL (ref 6.4–8.4)
RBC # BLD AUTO: 4.37 MILLION/UL (ref 3.81–5.12)
SODIUM SERPL-SCNC: 139 MMOL/L (ref 135–147)
TRIGL SERPL-MCNC: 91 MG/DL
TSH SERPL DL<=0.05 MIU/L-ACNC: 1.7 UIU/ML (ref 0.45–4.5)
WBC # BLD AUTO: 5.29 THOUSAND/UL (ref 4.31–10.16)

## 2024-01-11 PROCEDURE — 80053 COMPREHEN METABOLIC PANEL: CPT

## 2024-01-11 PROCEDURE — 36415 COLL VENOUS BLD VENIPUNCTURE: CPT

## 2024-01-11 PROCEDURE — 82378 CARCINOEMBRYONIC ANTIGEN: CPT

## 2024-01-11 PROCEDURE — 84443 ASSAY THYROID STIM HORMONE: CPT

## 2024-01-11 PROCEDURE — 80061 LIPID PANEL: CPT

## 2024-01-11 NOTE — TELEPHONE ENCOUNTER
Patient called stating she took notice her follow up for Jan 15th is for 2025 not 2024, which was a scheduling error. Is there any way Dr Morin can see her on Mon 1/15? If not, she also stated  she could do President's Day or anything after 4:00pm. Please call patient back to discuss and reschedule.  
no chest pain, no cough, and no shortness of breath.

## 2024-01-11 NOTE — TELEPHONE ENCOUNTER
This patient was scheduled incorrectly. her appointment was scheduled for 2025. The patient can only come in on januray 15th or anything after 4. Is there some where we can squeeze her in? Please advise. Thank you.

## 2024-01-15 ENCOUNTER — HOSPITAL ENCOUNTER (OUTPATIENT)
Dept: CT IMAGING | Facility: HOSPITAL | Age: 44
Discharge: HOME | End: 2024-01-15
Payer: COMMERCIAL

## 2024-01-15 ENCOUNTER — OFFICE VISIT (OUTPATIENT)
Dept: INTERNAL MEDICINE CLINIC | Facility: CLINIC | Age: 44
End: 2024-01-15
Payer: COMMERCIAL

## 2024-01-15 VITALS
OXYGEN SATURATION: 99 % | SYSTOLIC BLOOD PRESSURE: 122 MMHG | HEART RATE: 68 BPM | HEIGHT: 67 IN | DIASTOLIC BLOOD PRESSURE: 78 MMHG | BODY MASS INDEX: 33.27 KG/M2 | WEIGHT: 212 LBS

## 2024-01-15 DIAGNOSIS — Z85.09 HISTORY OF MALIGNANT NEOPLASM OF APPENDIX: ICD-10-CM

## 2024-01-15 DIAGNOSIS — E78.2 MODERATE MIXED HYPERLIPIDEMIA NOT REQUIRING STATIN THERAPY: Primary | ICD-10-CM

## 2024-01-15 DIAGNOSIS — Z00.00 WELLNESS EXAMINATION: ICD-10-CM

## 2024-01-15 DIAGNOSIS — C18.1 MALIGNANT TUMOR OF APPENDIX (HCC): ICD-10-CM

## 2024-01-15 DIAGNOSIS — E66.09 CLASS 1 OBESITY DUE TO EXCESS CALORIES WITHOUT SERIOUS COMORBIDITY WITH BODY MASS INDEX (BMI) OF 30.0 TO 30.9 IN ADULT: ICD-10-CM

## 2024-01-15 PROCEDURE — 71260 CT THORAX DX C+: CPT

## 2024-01-15 PROCEDURE — 74177 CT ABD & PELVIS W/CONTRAST: CPT

## 2024-01-15 PROCEDURE — 99396 PREV VISIT EST AGE 40-64: CPT | Performed by: INTERNAL MEDICINE

## 2024-01-15 RX ADMIN — IOHEXOL 85 ML: 350 INJECTION, SOLUTION INTRAVENOUS at 10:20

## 2024-01-15 NOTE — PROGRESS NOTES
ADULT ANNUAL PHYSICAL  [facility]    NAME: Cammy Bennett  AGE: 43 y.o. SEX: female  : 1980     DATE: 1/15/2024     Assessment and Plan:     Problem List Items Addressed This Visit        Digestive    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(HCC)     Patient is recently gone for CAT scan of the abdomen earlier today awaiting reports for surveillance            Other    Hyperlipidemia - Primary     Low-cholesterol diet check lipid profile ASCVD risk score low 0.8%         Relevant Orders    Lipid Panel with Direct LDL reflex    Comprehensive metabolic panel    Class 1 obesity due to excess calories without serious comorbidity with body mass index (BMI) of 30.0 to 30.9 in adult     Obesity -I have counseled patient following healthy and balanced diet, I would like the patient to lose weight, I would like the patient exercise routinely; we will continue monitor the patient's progress.         Wellness examination     Assessment and plan 1.  Health maintenance annual wellness examination overall the patient is clinically stable and doing well, we encouraged the patient to follow a healthy and balanced diet.  We recommend that the patient exercise routinely approximately 30 minutes 5 times per week .  We have reviewed the patient's vaccines and have made recommendations for updates if necessary      .  We will be ordering screening laboratories which are age appropriate.  Return to the office in   6 months   call if any problems.              Immunizations and preventive care screenings were discussed with patient today. Appropriate education was printed on patient's after visit summary.    Counseling:  Exercise: the importance of regular exercise/physical activity was discussed. Recommend exercise 3-5 times per week for at least 30 minutes.       Depression Screening and Follow-up Plan: Patient was screened for depression during today's encounter. They screened  negative with a PHQ-2 score of 0.        Return in about 6 months (around 7/15/2024).     Chief Complaint:     Chief Complaint   Patient presents with   • Physical Exam      History of Present Illness:     Adult Annual Physical   Patient here for a comprehensive physical exam. The patient reports no problems.    Diet and Physical Activity  Diet/Nutrition: well balanced diet.   Exercise: vigorous cardiovascular exercise.      Depression Screening  PHQ-2/9 Depression Screening    Little interest or pleasure in doing things: 0 - not at all  Feeling down, depressed, or hopeless: 0 - not at all  PHQ-2 Score: 0  PHQ-2 Interpretation: Negative depression screen       General Health  Sleep: sleeps well.   Hearing: normal - bilateral.  Vision: no vision problems.   Dental: regular dental visits.       /GYN Health  Follows with gynecology? no   Patient is: premenopausal  Last menstrual period: 4 weeks  Contraceptive method:     Advanced Care Planning  Do you have an advanced directive? yes  Do you have a durable medical power of ? yes     Review of Systems:     Review of Systems   Constitutional:  Negative for activity change, appetite change and unexpected weight change.   HENT:  Negative for congestion and postnasal drip.    Eyes:  Negative for visual disturbance.   Respiratory:  Negative for cough and shortness of breath.    Cardiovascular:  Negative for chest pain.   Gastrointestinal:  Negative for abdominal pain, diarrhea, nausea and vomiting.   Neurological:  Negative for dizziness, light-headedness and headaches.   Hematological:  Negative for adenopathy.      Past Medical History:     Past Medical History:   Diagnosis Date   • Anxiety     last assessed - 19Jan2018   • Cancer (HCC) 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   • Colon polyp    • Hyperlipidemia     last assessed - 12Dec2017   • IBS (irritable bowel syndrome)    • Low grade mucinous neoplasm of appendix     last assessed - 19Jan2018   • Nausea    • Normal delivery      2014 daughter   • Palpitations     last assessed - [id number]   • Right bundle branch block     last assessed - [id number]   • Thyroid cyst     last assessed - 15Oct2012   • Vacuum extractor delivery, delivered     2012 daughter   • Varicella     childhood   • Vitamin D deficiency       Past Surgical History:     Past Surgical History:   Procedure Laterality Date   • APPENDECTOMY     • COLPOSCOPY W/ BIOPSY / CURETTAGE      Colposcopy Cervix with Biopsy(s)   • LACRIMAL DUCT PROBING      surgery of the Lacrimal system   • CO COLONOSCOPY FLX DX W/COLLJ SPEC WHEN PFRMD N/A 2/15/2016    Procedure: EGD AND COLONOSCOPY;  Surgeon: Ifeanyi Evans DO;  Location: [facility];  Service: General   • SUBTOTAL COLECTOMY      Partial colectomy - with resection of appendix   • TEAR DUCT SURGERY     • TOOTH EXTRACTION      last assessed - 18Oct2015   • TUMOR REMOVAL  12/2015    removed from appendix      Social History:     Social History     Socioeconomic History   • Marital status: /Civil Union     Spouse name: None   • Number of children: None   • Years of education: None   • Highest education level: None   Occupational History   • None   Tobacco Use   • Smoking status: Never   • Smokeless tobacco: Never   Vaping Use   • Vaping status: Never Used   Substance and Sexual Activity   • Alcohol use: Yes     Comment: soc   • Drug use: No   • Sexual activity: Yes     Partners: Male     Birth control/protection: Male Sterilization   Other Topics Concern   • None   Social History Narrative    Activities: Soccer    Always uses seat belt    Drinks coffee    Exercise: Running    Exercises moderately less than 3 times a week         Social Determinants of Health     Financial Resource Strain: Not on file   Food Insecurity: Not on file   Transportation Needs: Not on file   Physical Activity: Not on file   Stress: Not on file   Social Connections: Not on file   Intimate Partner Violence: Not on file   Housing Stability: Not on file       Family History:     Family History   Problem Relation Age of Onset   • Heart murmur Mother         type unspecified    • Cancer Mother         thyroid   • Thyroid cancer Mother 49   • Hyperlipidemia Father    • Hypertension Father    • Esophageal cancer Father    • Depression Sister    • No Known Problems Daughter    • No Known Problems Daughter    • No Known Problems Son    • Arthritis Maternal Grandmother    • Coronary artery disease Maternal Grandmother    • Transient ischemic attack Maternal Grandmother    • Cancer Maternal Grandmother         thyroid   • Hypertension Maternal Grandmother    • Stroke Maternal Grandmother    • Thyroid cancer Maternal Grandmother 49   • No Known Problems Maternal Grandfather    • Depression Paternal Grandmother    • Cancer Paternal Grandfather         bowel   • Heart disease Paternal Grandfather    • Hyperlipidemia Paternal Grandfather    • Hypertension Paternal Grandfather    • Colon cancer Paternal Grandfather 81   • Esophageal cancer Paternal Grandfather 92   • Anxiety disorder Paternal Grandfather    • Cancer Maternal Aunt         lung   • Lung cancer Maternal Aunt 50   • Heart defect Maternal Aunt    • No Known Problems Maternal Aunt    • No Known Problems Maternal Aunt    • Lake syndrome Paternal Aunt    • Cancer Paternal Aunt         lymphoma; unknown age   • Lymphoma Paternal Aunt       Current Medications:     Current Outpatient Medications   Medication Sig Dispense Refill   • albuterol (ProAir HFA) 90 mcg/act inhaler Inhale 2 puffs every 6 (six) hours as needed for wheezing 18 g 3   • Cholecalciferol (VITAMIN D) 2000 units CAPS Take 3,000 Units by mouth       • fluticasone (FLONASE) 50 mcg/act nasal spray 2 sprays into each nostril daily 1 g 0   • Multiple Vitamins-Minerals (MULTIVITAMIN GUMMIES ADULT PO) Take by mouth     • Polyethylene Glycol 3350 (MIRALAX PO) Take 1 packet by mouth daily as needed.     • guaifenesin-codeine (GUAIFENESIN AC) 100-10 MG/5ML liquid Take  "5 mL by mouth daily at bedtime as needed for cough (Patient not taking: Reported on 1/15/2024) 180 mL 0   • mometasone (ELOCON) 0.1 % cream APPLY TO SKIN TWICE DAILY FOR ONE WEEK, THEN AS NEEDED (Patient not taking: Reported on 5/22/2023)     • sertraline (ZOLOFT) 25 mg tablet Take 1 tablet (25 mg total) by mouth daily 90 tablet 3   • sertraline (ZOLOFT) 50 mg tablet TAKE 1 TABLET BY MOUTH EVERY DAY (Patient not taking: Reported on 7/3/2023) 90 tablet 2     No current facility-administered medications for this visit.      Allergies:     Allergies   Allergen Reactions   • Seasonal Ic [Cholestatin] Allergic Rhinitis   • Amoxicillin Hives   • Augmentin [Amoxicillin-Pot Clavulanate] Hives   • Fluoxetine Other (See Comments)     Other reaction(s): bloating   • Penicillins Rash and Hives   • Wound Dressings Rash      Physical Exam:     /78 (BP Location: Right arm, Patient Position: Sitting, Cuff Size: Large)   Pulse 68   Ht 5' 7\" (1.702 m)   Wt 96.2 kg (212 lb)   SpO2 99%   BMI 33.20 kg/m²     Physical Exam  Vitals and nursing note reviewed.   Constitutional:       General: She is not in acute distress.     Appearance: Normal appearance. She is well-developed. She is obese. She is not ill-appearing, toxic-appearing or diaphoretic.   HENT:      Head: Normocephalic and atraumatic.      Right Ear: External ear normal.      Left Ear: External ear normal.      Nose: Nose normal.   Eyes:      Pupils: Pupils are equal, round, and reactive to light.   Cardiovascular:      Rate and Rhythm: Normal rate and regular rhythm.      Heart sounds: Normal heart sounds. No murmur heard.  Pulmonary:      Effort: Pulmonary effort is normal.      Breath sounds: Normal breath sounds.   Abdominal:      General: There is no distension.      Palpations: Abdomen is soft.      Tenderness: There is no abdominal tenderness. There is no guarding.   Neurological:      Mental Status: She is alert.   Psychiatric:         Mood and Affect: Mood " is not anxious or depressed.         Thought Content: Thought content does not include suicidal ideation.          Kota Morin DO  [facility]

## 2024-01-16 NOTE — ASSESSMENT & PLAN NOTE
Assessment and plan 1.  Health maintenance annual wellness examination overall the patient is clinically stable and doing well, we encouraged the patient to follow a healthy and balanced diet.  We recommend that the patient exercise routinely approximately 30 minutes 5 times per week .  We have reviewed the patient's vaccines and have made recommendations for updates if necessary      .  We will be ordering screening laboratories which are age appropriate.  Return to the office in   6 months   call if any problems.

## 2024-01-16 NOTE — ASSESSMENT & PLAN NOTE
Patient is recently gone for CAT scan of the abdomen earlier today awaiting reports for surveillance

## 2024-01-24 ENCOUNTER — OFFICE VISIT (OUTPATIENT)
Dept: SURGICAL ONCOLOGY | Facility: CLINIC | Age: 44
End: 2024-01-24
Payer: COMMERCIAL

## 2024-01-24 VITALS
OXYGEN SATURATION: 98 % | BODY MASS INDEX: 33.51 KG/M2 | DIASTOLIC BLOOD PRESSURE: 62 MMHG | HEIGHT: 67 IN | SYSTOLIC BLOOD PRESSURE: 110 MMHG | HEART RATE: 84 BPM | TEMPERATURE: 98.2 F | WEIGHT: 213.5 LBS

## 2024-01-24 DIAGNOSIS — Z80.8 FAMILY HISTORY OF THYROID CANCER: Primary | ICD-10-CM

## 2024-01-24 DIAGNOSIS — Z85.09 HISTORY OF MALIGNANT NEOPLASM OF APPENDIX: ICD-10-CM

## 2024-01-24 PROCEDURE — 99213 OFFICE O/P EST LOW 20 MIN: CPT | Performed by: SURGERY

## 2024-01-24 NOTE — LETTER
January 24, 2024     Kota Morin DO  [address] [place] [id number]    Patient: Cammy Bennett   YOB: 1980   Date of Visit: 1/24/2024       Dear Dr. Morin:    Thank you for referring Cammy Bennett to me for evaluation. Below are my notes for this consultation.    If you have questions, please do not hesitate to call me. I look forward to following your patient along with you.         Sincerely,        Saad Ferguson MD        CC: MD Saad Huff MD  1/24/2024  8:27 AM  Sign when Signing Visit               Surgical Oncology Follow Up       [address] [place] [address] SURGICAL ONCOLOGY [address] [id number]    Cammy Bennett  1980  [id number]  [address] [place] [address] SURGICAL ONCOLOGY [address] [id number]    Diagnoses and all orders for this visit:    Family history of thyroid cancer    History of malignant neoplasm of appendix  -     CT abdomen pelvis w contrast; Future  -     BUN; Future  -     CEA; Future  -     Creatinine, serum; Future        Chief Complaint   Patient presents with   • Follow-up       Return in about 2 years (around 1/24/2026) for Office Visit, Imaging - See orders, Labs - See Treatment Plan, with Ebony.      Oncology History    No history exists.       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 Margins negative   Treatment History: Laparoscopic appendectomy December 2015   Current Therapy: Observation   Disease Status: MEGA     History of Present Illness: Patient returns in follow-up of her low-grade mucinous appendiceal neoplasm.  She is doing well at this time with no complaints.  She denies any abdominal pain, nausea or vomiting.  No change in appetite or unintentional weight loss.  Her father was recently diagnosed with esophagus cancer and underwent esophagectomy for an early cancer.  Her  paternal grandfather also had esophagus cancer.  Her father is in a genetic study at [facility].  CT from January 15, 2024 shows stable lung nodules.  There is no evidence of metastatic disease.  I personally reviewed the films.  CEA continues to be normal at 0.4 ng/mL.    Review of Systems  Complete ROS Surg Onc:   Complete ROS Surg Onc:   Constitutional: The patient denies new or recent history of general fatigue, no recent weight loss, no change in appetite.   Eyes: No complaints of visual problems, no scleral icterus.   ENT: no complaints of ear pain, no hoarseness, no difficulty swallowing,  no tinnitus and no new masses in head, oral cavity, or neck.   Cardiovascular: No complaints of chest pain, no palpitations, no ankle edema.   Respiratory: No complaints of shortness of breath, no cough.   Gastrointestinal: No complaints of jaundice, no bloody stools, no pale stools.   Genitourinary: No complaints of dysuria, no hematuria, no nocturia, no frequent urination, no urethral discharge.   Musculoskeletal: No complaints of weakness, paralysis, joint stiffness or arthralgias.  Integumentary: No complaints of rash, no new lesions.   Neurological: No complaints of convulsions, no seizures, no dizziness.   Hematologic/Lymphatic: No complaints of easy bruising.   Endocrine:  No hot or cold intolerance.  No polydipsia, polyphagia, or polyuria.  Allergy/immunology:  No environmental allergies.  No food allergies.  Not immunocompromised.  Skin:  No pallor or rash.  No wound.        Patient Active Problem List   Diagnosis   • Anxiety   • Benign colon polyp   • Chronic constipation   • GERD without esophagitis   • Hyperlipidemia   • IBS (irritable bowel syndrome)   • 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(HCC)   • Vitamin D deficiency   • History of loop electrical excision procedure (LEEP)   • H/O right bundle branch block   • Pain of both hip joints   • Class 1 obesity due to excess calories without serious comorbidity with body mass  index (BMI) of 30.0 to 30.9 in adult   • Pain in both knees   • Atypical chest pain   • Palpitations   • PVC (premature ventricular contraction)   • Tendinitis of left foot   • Wellness examination   • History of malignant neoplasm of appendix   • Encounter for follow-up examination after completed treatment for malignant neoplasm   • Family history of thyroid cancer   • Lung nodules   • COVID-19   • Hoarseness of voice   • Tingling   • PND (post-nasal drip)     Past Medical History:   Diagnosis Date   • Anxiety     last assessed - 19Jan2018   • Cancer (HCC) 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   • Colon polyp    • Hyperlipidemia     last assessed - 12Dec2017   • IBS (irritable bowel syndrome)    • Low grade mucinous neoplasm of appendix     last assessed - 19Jan2018   • Nausea    • Normal delivery     2014 daughter   • Palpitations     last assessed - 14Nov2013   • Right bundle branch block     last assessed - 15Nov2013   • Thyroid cyst     last assessed - 15Oct2012   • Vacuum extractor delivery, delivered     2012 daughter   • Varicella     childhood   • Vitamin D deficiency      Past Surgical History:   Procedure Laterality Date   • APPENDECTOMY     • COLPOSCOPY W/ BIOPSY / CURETTAGE      Colposcopy Cervix with Biopsy(s)   • LACRIMAL DUCT PROBING      surgery of the Lacrimal system   • [place] COLONOSCOPY FLX DX W/COLLJ SPEC WHEN PFRMD N/A 2/15/2016    Procedure: EGD AND COLONOSCOPY;  Surgeon: Ifeanyi Evans DO;  Location: [facility];  Service: General   • SUBTOTAL COLECTOMY      Partial colectomy - with resection of appendix   • TEAR DUCT SURGERY     • TOOTH EXTRACTION      last assessed - 18Oct2015   • TUMOR REMOVAL  12/2015    removed from appendix     Family History   Problem Relation Age of Onset   • Heart murmur Mother         type unspecified    • Cancer Mother         thyroid   • Thyroid cancer Mother 49   • Hyperlipidemia Father    • Hypertension Father    • Esophageal cancer Father    • Depression Sister    • No Known  Problems Daughter    • No Known Problems Daughter    • No Known Problems Son    • Arthritis Maternal Grandmother    • Coronary artery disease Maternal Grandmother    • Transient ischemic attack Maternal Grandmother    • Cancer Maternal Grandmother         thyroid   • Hypertension Maternal Grandmother    • Stroke Maternal Grandmother    • Thyroid cancer Maternal Grandmother 49   • No Known Problems Maternal Grandfather    • Depression Paternal Grandmother    • Cancer Paternal Grandfather         bowel   • Heart disease Paternal Grandfather    • Hyperlipidemia Paternal Grandfather    • Hypertension Paternal Grandfather    • Colon cancer Paternal Grandfather 81   • Esophageal cancer Paternal Grandfather 92   • Anxiety disorder Paternal Grandfather    • Cancer Maternal Aunt         lung   • Lung cancer Maternal Aunt 50   • Heart defect Maternal Aunt    • No Known Problems Maternal Aunt    • No Known Problems Maternal Aunt    • Lake syndrome Paternal Aunt    • Cancer Paternal Aunt         lymphoma; unknown age   • Lymphoma Paternal Aunt      Social History     Socioeconomic History   • Marital status: /Civil Union     Spouse name: Not on file   • Number of children: Not on file   • Years of education: Not on file   • Highest education level: Not on file   Occupational History   • Not on file   Tobacco Use   • Smoking status: Never   • Smokeless tobacco: Never   Vaping Use   • Vaping status: Never Used   Substance and Sexual Activity   • Alcohol use: Yes     Comment: soc   • Drug use: No   • Sexual activity: Yes     Partners: Male     Birth control/protection: Male Sterilization   Other Topics Concern   • Not on file   Social History Narrative    Activities: Soccer    Always uses seat belt    Drinks coffee    Exercise: Running    Exercises moderately less than 3 times a week         Social Determinants of Health     Financial Resource Strain: Not on file   Food Insecurity: Not on file   Transportation Needs: Not  on file   Physical Activity: Not on file   Stress: Not on file   Social Connections: Not on file   Intimate Partner Violence: Not on file   Housing Stability: Not on file       Current Outpatient Medications:   •  albuterol (ProAir HFA) 90 mcg/act inhaler, Inhale 2 puffs every 6 (six) hours as needed for wheezing, Disp: 18 g, Rfl: 3  •  Cholecalciferol (VITAMIN D) 2000 units CAPS, Take 3,000 Units by mouth  , Disp: , Rfl:   •  fluticasone (FLONASE) 50 mcg/act nasal spray, 2 sprays into each nostril daily, Disp: 1 g, Rfl: 0  •  Multiple Vitamins-Minerals (MULTIVITAMIN GUMMIES ADULT PO), Take by mouth, Disp: , Rfl:   •  Polyethylene Glycol 3350 (MIRALAX PO), Take 1 packet by mouth daily as needed., Disp: , Rfl:   •  guaifenesin-codeine (GUAIFENESIN AC) 100-10 MG/5ML liquid, Take 5 mL by mouth daily at bedtime as needed for cough (Patient not taking: Reported on 1/15/2024), Disp: 180 mL, Rfl: 0  •  mometasone (ELOCON) 0.1 % cream, APPLY TO SKIN TWICE DAILY FOR ONE WEEK, THEN AS NEEDED (Patient not taking: Reported on 5/22/2023), Disp: , Rfl:   •  sertraline (ZOLOFT) 25 mg tablet, Take 1 tablet (25 mg total) by mouth daily, Disp: 90 tablet, Rfl: 3  •  sertraline (ZOLOFT) 50 mg tablet, TAKE 1 TABLET BY MOUTH EVERY DAY (Patient not taking: Reported on 7/3/2023), Disp: 90 tablet, Rfl: 2  Allergies   Allergen Reactions   • Seasonal Ic [Cholestatin] Allergic Rhinitis   • Amoxicillin Hives   • Augmentin [Amoxicillin-Pot Clavulanate] Hives   • Fluoxetine Other (See Comments)     Other reaction(s): bloating   • Penicillins Rash and Hives   • Wound Dressings Rash     Vitals:    01/24/24 0759   BP: 110/62   Pulse: 84   Temp: 98.2 °F (36.8 °C)   SpO2: 98%       Physical Exam  Constitutional: General appearance: The Patient is well-developed and well-nourished who appears the stated age in no acute distress. Patient is pleasant and talkative.     HEENT:  Normocephalic.  Sclerae are anicteric. Mucous membranes are moist. Neck is  supple without adenopathy. No JVD.     Chest: The lungs are clear to auscultation.     Cardiac: Heart is regular rate.     Abdomen: Abdomen is soft, non-tender, non-distended and without masses.     Extremities: There is no clubbing or cyanosis. There is no edema.  Symmetric.  Neuro: Grossly nonfocal. Gait is normal.     Lymphatic: No evidence of cervical adenopathy bilaterally.   No evidence of axillary adenopathy bilaterally.   Skin: Warm, anicteric.    Psych:  Patient is pleasant and talkative.  Breasts:        Pathology:  [unfilled]    Labs:            Component  Ref Range & Units 1/11/24  8:37 AM 12/17/21 11:21 AM 10/30/20 12:07 PM 10/6/19 11:21 AM 9/12/18  9:54 AM 12/15/16  7:24 AM 12/15/15  9:56 AM   CEA  0.0 - 3.0 ng/mL 0.4 <0.5 CM <0.5 CM <0.5 CM <0.5 CM <0.5 <0.5 R,          Imaging  CT chest abdomen pelvis w contrast    Result Date: 1/21/2024  Narrative: CT CHEST, ABDOMEN AND PELVIS WITH IV CONTRAST INDICATION: Z85.09: Personal history of malignant neoplasm of other digestive organs. COMPARISON: CT dated 12/17/2021 TECHNIQUE: CT examination of the chest, abdomen and pelvis was performed. Multiplanar 2D reformatted images were created from the source data. This examination, like all CT scans performed in the [facility], was performed utilizing techniques to minimize radiation dose exposure, including the use of iterative reconstruction and automated exposure control. Radiation dose length product (DLP) for this visit: 1114 mGy-cm IV Contrast: 85 mL of iohexol (OMNIPAQUE) Enteric Contrast: Not administered. FINDINGS: CHEST LUNGS: Patent central airways. Lung nodules annotated on series 4: 3 mm right middle lobe nodule on image #141, stable. 3 mm right lower lobe nodule on image #181, stable. 3 mm right lower lobe nodule on image #188, stable. PLEURA: Unremarkable. HEART/GREAT VESSELS: Heart is unremarkable for patient's age. No thoracic aortic aneurysm. MEDIASTINUM AND ROMEO:  Unremarkable. CHEST WALL AND LOWER NECK: Unremarkable. ABDOMEN LIVER/BILIARY TREE: Unremarkable. GALLBLADDER: No calcified gallstones. No pericholecystic inflammatory change. SPLEEN: Unremarkable. PANCREAS: Unremarkable. ADRENAL GLANDS: Unremarkable. KIDNEYS/URETERS: Unremarkable. No hydronephrosis. STOMACH AND BOWEL: Unremarkable. APPENDIX: Surgically absent. ABDOMINOPELVIC CAVITY: No ascites. No pneumoperitoneum. No lymphadenopathy. VESSELS: There is a circumaortic left renal vein. Normal variant. PELVIS REPRODUCTIVE ORGANS: Unremarkable for patient's age. URINARY BLADDER: Unremarkable. ABDOMINAL WALL/INGUINAL REGIONS: Unremarkable. BONES: No acute fracture or suspicious osseous lesion.     Impression: No evidence of recurrent or metastatic disease in the chest, abdomen or pelvis. Workstation performed: [id number]     I personally reviewed and interpreted the above laboratory and imaging data.    Discussion/Summary: 43 year-old female status post laparoscopic appendectomy for low-grade mucinous appendiceal neoplasm. There is no evidence of recurrence by imaging, symptomatology, or physical exam. Her CEA level is normal. I did discuss that the lung nodules are stable.  I will see her again in 2 years with repeat imaging and a CEA level.  Patient is up-to-date with colonoscopy, with her next one due in 2026.  If she develops any symptoms in the interim we will obtain the CT and CEA sooner.  After her next imaging, she will be 10 years out.  We would likely just follow her with serial CEA levels, and obtain imaging if the CEA rises.  She will contact us if her thyroid ultrasound is abnormal. She is agreeable to this plan.  All her questions were answered.

## 2024-01-24 NOTE — PROGRESS NOTES
Surgical Oncology Follow Up       [address] [facility] SURGICAL ONCOLOGY [facility]  [address] [facility] [address]    Cammy Bennett  1980  [id number]  [address] [facility] SURGICAL ONCOLOGY [facility]HAUN  [address] [facility]  [facility] [address]    Diagnoses and all orders for this visit:    Family history of thyroid cancer    History of malignant neoplasm of appendix  -     CT abdomen pelvis w contrast; Future  -     BUN; Future  -     CEA; Future  -     Creatinine, serum; Future        Chief Complaint   Patient presents with    Follow-up       Return in about 2 years (around 1/24/2026) for Office Visit, Imaging - See orders, Labs - See Treatment Plan, with Ebony.      Oncology History    No history exists.       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 Margins negative   Treatment History: Laparoscopic appendectomy December 2015   Current Therapy: Observation   Disease Status: MEGA     History of Present Illness: Patient returns in follow-up of her low-grade mucinous appendiceal neoplasm.  She is doing well at this time with no complaints.  She denies any abdominal pain, nausea or vomiting.  No change in appetite or unintentional weight loss.  Her father was recently diagnosed with esophagus cancer and underwent esophagectomy for an early cancer.  Her paternal grandfather also had esophagus cancer.  Her father is in a genetic study at [facility].  CT from January 15, 2024 shows stable lung nodules.  There is no evidence of metastatic disease.  I personally reviewed the films.  CEA continues to be normal at 0.4 ng/mL.    Review of Systems  Complete ROS Surg Onc:   Complete ROS Surg Onc:   Constitutional: The patient denies new or recent history of general fatigue, no recent weight loss, no change in appetite.   Eyes: No complaints of visual problems, no scleral icterus.   ENT: no complaints of ear  pain, no hoarseness, no difficulty swallowing,  no tinnitus and no new masses in head, oral cavity, or neck.   Cardiovascular: No complaints of chest pain, no palpitations, no ankle edema.   Respiratory: No complaints of shortness of breath, no cough.   Gastrointestinal: No complaints of jaundice, no bloody stools, no pale stools.   Genitourinary: No complaints of dysuria, no hematuria, no nocturia, no frequent urination, no urethral discharge.   Musculoskeletal: No complaints of weakness, paralysis, joint stiffness or arthralgias.  Integumentary: No complaints of rash, no new lesions.   Neurological: No complaints of convulsions, no seizures, no dizziness.   Hematologic/Lymphatic: No complaints of easy bruising.   Endocrine:  No hot or cold intolerance.  No polydipsia, polyphagia, or polyuria.  Allergy/immunology:  No environmental allergies.  No food allergies.  Not immunocompromised.  Skin:  No pallor or rash.  No wound.        Patient Active Problem List   Diagnosis    Anxiety    Benign colon polyp    Chronic constipation    GERD without esophagitis    Hyperlipidemia    IBS (irritable bowel syndrome)    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(HCC)    Vitamin D deficiency    History of loop electrical excision procedure (LEEP)    H/O right bundle branch block    Pain of both hip joints    Class 1 obesity due to excess calories without serious comorbidity with body mass index (BMI) of 30.0 to 30.9 in adult    Pain in both knees    Atypical chest pain    Palpitations    PVC (premature ventricular contraction)    Tendinitis of left foot    Wellness examination    History of malignant neoplasm of appendix    Encounter for follow-up examination after completed treatment for malignant neoplasm    Family history of thyroid cancer    Lung nodules    COVID-19    Hoarseness of voice    Tingling    PND (post-nasal drip)     Past Medical History:   Diagnosis Date    Anxiety     last assessed - 19Jan2018    Cancer (HCC) 12/2015     appendix    Colon polyp     Hyperlipidemia     last assessed - 12Dec2017    IBS (irritable bowel syndrome)     Low grade mucinous neoplasm of appendix     last assessed - 19Jan2018    Nausea     Normal delivery     2014 daughter    Palpitations     last assessed - 14Nov2013    Right bundle branch block     last assessed - 15Nov2013    Thyroid cyst     last assessed - 15Oct2012    Vacuum extractor delivery, delivered     2012 daughter    Varicella     childhood    Vitamin D deficiency      Past Surgical History:   Procedure Laterality Date    APPENDECTOMY      COLPOSCOPY W/ BIOPSY / CURETTAGE      Colposcopy Cervix with Biopsy(s)    LACRIMAL DUCT PROBING      surgery of the Lacrimal system    IN COLONOSCOPY FLX DX W/COLLJ SPEC WHEN PFRMD N/A 2/15/2016    Procedure: EGD AND COLONOSCOPY;  Surgeon: Ifeanyi Evans DO;  Location: [facility];  Service: General    SUBTOTAL COLECTOMY      Partial colectomy - with resection of appendix    TEAR DUCT SURGERY      TOOTH EXTRACTION      last assessed - 18Oct2015    TUMOR REMOVAL  12/2015    removed from appendix     Family History   Problem Relation Age of Onset    Heart murmur Mother         type unspecified     Cancer Mother         thyroid    Thyroid cancer Mother 49    Hyperlipidemia Father     Hypertension Father     Esophageal cancer Father     Depression Sister     No Known Problems Daughter     No Known Problems Daughter     No Known Problems Son     Arthritis Maternal Grandmother     Coronary artery disease Maternal Grandmother     Transient ischemic attack Maternal Grandmother     Cancer Maternal Grandmother         thyroid    Hypertension Maternal Grandmother     Stroke Maternal Grandmother     Thyroid cancer Maternal Grandmother 49    No Known Problems Maternal Grandfather     Depression Paternal Grandmother     Cancer Paternal Grandfather         bowel    Heart disease Paternal Grandfather     Hyperlipidemia Paternal Grandfather     Hypertension Paternal Grandfather      Colon cancer Paternal Grandfather 81    Esophageal cancer Paternal Grandfather 92    Anxiety disorder Paternal Grandfather     Cancer Maternal Aunt         lung    Lung cancer Maternal Aunt 50    Heart defect Maternal Aunt     No Known Problems Maternal Aunt     No Known Problems Maternal Aunt     Lake syndrome Paternal Aunt     Cancer Paternal Aunt         lymphoma; unknown age    Lymphoma Paternal Aunt      Social History     Socioeconomic History    Marital status: /Civil Union     Spouse name: Not on file    Number of children: Not on file    Years of education: Not on file    Highest education level: Not on file   Occupational History    Not on file   Tobacco Use    Smoking status: Never    Smokeless tobacco: Never   Vaping Use    Vaping status: Never Used   Substance and Sexual Activity    Alcohol use: Yes     Comment: soc    Drug use: No    Sexual activity: Yes     Partners: Male     Birth control/protection: Male Sterilization   Other Topics Concern    Not on file   Social History Narrative    Activities: Soccer    Always uses seat belt    Drinks coffee    Exercise: Running    Exercises moderately less than 3 times a week         Social Determinants of Health     Financial Resource Strain: Not on file   Food Insecurity: Not on file   Transportation Needs: Not on file   Physical Activity: Not on file   Stress: Not on file   Social Connections: Not on file   Intimate Partner Violence: Not on file   Housing Stability: Not on file       Current Outpatient Medications:     albuterol (ProAir HFA) 90 mcg/act inhaler, Inhale 2 puffs every 6 (six) hours as needed for wheezing, Disp: 18 g, Rfl: 3    Cholecalciferol (VITAMIN D) 2000 units CAPS, Take 3,000 Units by mouth  , Disp: , Rfl:     fluticasone (FLONASE) 50 mcg/act nasal spray, 2 sprays into each nostril daily, Disp: 1 g, Rfl: 0    Multiple Vitamins-Minerals (MULTIVITAMIN GUMMIES ADULT PO), Take by mouth, Disp: , Rfl:     Polyethylene Glycol 3350  (MIRALAX PO), Take 1 packet by mouth daily as needed., Disp: , Rfl:     guaifenesin-codeine (GUAIFENESIN AC) 100-10 MG/5ML liquid, Take 5 mL by mouth daily at bedtime as needed for cough (Patient not taking: Reported on 1/15/2024), Disp: 180 mL, Rfl: 0    mometasone (ELOCON) 0.1 % cream, APPLY TO SKIN TWICE DAILY FOR ONE WEEK, THEN AS NEEDED (Patient not taking: Reported on 5/22/2023), Disp: , Rfl:     sertraline (ZOLOFT) 25 mg tablet, Take 1 tablet (25 mg total) by mouth daily, Disp: 90 tablet, Rfl: 3    sertraline (ZOLOFT) 50 mg tablet, TAKE 1 TABLET BY MOUTH EVERY DAY (Patient not taking: Reported on 7/3/2023), Disp: 90 tablet, Rfl: 2  Allergies   Allergen Reactions    Seasonal Ic [Cholestatin] Allergic Rhinitis    Amoxicillin Hives    Augmentin [Amoxicillin-Pot Clavulanate] Hives    Fluoxetine Other (See Comments)     Other reaction(s): bloating    Penicillins Rash and Hives    Wound Dressings Rash     Vitals:    01/24/24 0759   BP: 110/62   Pulse: 84   Temp: 98.2 °F (36.8 °C)   SpO2: 98%       Physical Exam  Constitutional: General appearance: The Patient is well-developed and well-nourished who appears the stated age in no acute distress. Patient is pleasant and talkative.     HEENT:  Normocephalic.  Sclerae are anicteric. Mucous membranes are moist. Neck is supple without adenopathy. No JVD.     Chest: The lungs are clear to auscultation.     Cardiac: Heart is regular rate.     Abdomen: Abdomen is soft, non-tender, non-distended and without masses.     Extremities: There is no clubbing or cyanosis. There is no edema.  Symmetric.  Neuro: Grossly nonfocal. Gait is normal.     Lymphatic: No evidence of cervical adenopathy bilaterally.   No evidence of axillary adenopathy bilaterally.   Skin: Warm, anicteric.    Psych:  Patient is pleasant and talkative.  Breasts:        Pathology:  [unfilled]    Labs:            Component  Ref Range & Units 1/11/24  8:37 AM 12/17/21 11:21 AM 10/30/20 12:07 PM 10/6/19 11:21 AM  9/12/18  9:54 AM 12/15/16  7:24 AM 12/15/15  9:56 AM   CEA  0.0 - 3.0 ng/mL 0.4 <0.5 CM <0.5 CM <0.5 CM <0.5 CM <0.5 <0.5 R,          Imaging  CT chest abdomen pelvis w contrast    Result Date: 1/21/2024  Narrative: CT CHEST, ABDOMEN AND PELVIS WITH IV CONTRAST INDICATION: Z85.09: Personal history of malignant neoplasm of other digestive organs. COMPARISON: CT dated 12/17/2021 TECHNIQUE: CT examination of the chest, abdomen and pelvis was performed. Multiplanar 2D reformatted images were created from the source data. This examination, like all CT scans performed in the [facility], was performed utilizing techniques to minimize radiation dose exposure, including the use of iterative reconstruction and automated exposure control. Radiation dose length product (DLP) for this visit: 1114 mGy-cm IV Contrast: 85 mL of iohexol (OMNIPAQUE) Enteric Contrast: Not administered. FINDINGS: CHEST LUNGS: Patent central airways. Lung nodules annotated on series 4: 3 mm right middle lobe nodule on image #141, stable. 3 mm right lower lobe nodule on image #181, stable. 3 mm right lower lobe nodule on image #188, stable. PLEURA: Unremarkable. HEART/GREAT VESSELS: Heart is unremarkable for patient's age. No thoracic aortic aneurysm. MEDIASTINUM AND ROMEO: Unremarkable. CHEST WALL AND LOWER NECK: Unremarkable. ABDOMEN LIVER/BILIARY TREE: Unremarkable. GALLBLADDER: No calcified gallstones. No pericholecystic inflammatory change. SPLEEN: Unremarkable. PANCREAS: Unremarkable. ADRENAL GLANDS: Unremarkable. KIDNEYS/URETERS: Unremarkable. No hydronephrosis. STOMACH AND BOWEL: Unremarkable. APPENDIX: Surgically absent. ABDOMINOPELVIC CAVITY: No ascites. No pneumoperitoneum. No lymphadenopathy. VESSELS: There is a circumaortic left renal vein. Normal variant. PELVIS REPRODUCTIVE ORGANS: Unremarkable for patient's age. URINARY BLADDER: Unremarkable. ABDOMINAL WALL/INGUINAL REGIONS: Unremarkable. BONES: No acute fracture or  suspicious osseous lesion.     Impression: No evidence of recurrent or metastatic disease in the chest, abdomen or pelvis. Workstation performed: [id number]     I personally reviewed and interpreted the above laboratory and imaging data.    Discussion/Summary: 43 year-old female status post laparoscopic appendectomy for low-grade mucinous appendiceal neoplasm. There is no evidence of recurrence by imaging, symptomatology, or physical exam. Her CEA level is normal. I did discuss that the lung nodules are stable.  I will see her again in 2 years with repeat imaging and a CEA level.  Patient is up-to-date with colonoscopy, with her next one due in 2026.  If she develops any symptoms in the interim we will obtain the CT and CEA sooner.  After her next imaging, she will be 10 years out.  We would likely just follow her with serial CEA levels, and obtain imaging if the CEA rises.  She will contact us if her thyroid ultrasound is abnormal.  I offered to have her see medical genetics, but since her father is on study [facility], she was told she will be contacted if there are any genetic abnormalities in her family.  She is agreeable to this plan.  All her questions were answered.

## 2024-04-06 ENCOUNTER — OFFICE VISIT (OUTPATIENT)
Dept: URGENT CARE | Age: 44
End: 2024-04-06
Payer: COMMERCIAL

## 2024-04-06 VITALS
OXYGEN SATURATION: 97 % | RESPIRATION RATE: 20 BRPM | SYSTOLIC BLOOD PRESSURE: 124 MMHG | TEMPERATURE: 97.4 F | WEIGHT: 212.8 LBS | BODY MASS INDEX: 33.33 KG/M2 | DIASTOLIC BLOOD PRESSURE: 80 MMHG | HEART RATE: 81 BPM

## 2024-04-06 DIAGNOSIS — J02.9 SORE THROAT: Primary | ICD-10-CM

## 2024-04-06 LAB — S PYO AG THROAT QL: NEGATIVE

## 2024-04-06 PROCEDURE — S9083 URGENT CARE CENTER GLOBAL: HCPCS | Performed by: STUDENT IN AN ORGANIZED HEALTH CARE EDUCATION/TRAINING PROGRAM

## 2024-04-06 PROCEDURE — G0382 LEV 3 HOSP TYPE B ED VISIT: HCPCS | Performed by: STUDENT IN AN ORGANIZED HEALTH CARE EDUCATION/TRAINING PROGRAM

## 2024-04-06 PROCEDURE — 87070 CULTURE OTHR SPECIMN AEROBIC: CPT | Performed by: STUDENT IN AN ORGANIZED HEALTH CARE EDUCATION/TRAINING PROGRAM

## 2024-04-06 PROCEDURE — 96372 THER/PROPH/DIAG INJ SC/IM: CPT | Performed by: STUDENT IN AN ORGANIZED HEALTH CARE EDUCATION/TRAINING PROGRAM

## 2024-04-06 PROCEDURE — 87880 STREP A ASSAY W/OPTIC: CPT | Performed by: STUDENT IN AN ORGANIZED HEALTH CARE EDUCATION/TRAINING PROGRAM

## 2024-04-06 RX ORDER — DEXAMETHASONE SODIUM PHOSPHATE 10 MG/ML
8 INJECTION, SOLUTION INTRAMUSCULAR; INTRAVENOUS ONCE
Status: COMPLETED | OUTPATIENT
Start: 2024-04-06 | End: 2024-04-06

## 2024-04-06 RX ADMIN — DEXAMETHASONE SODIUM PHOSPHATE 8 MG: 10 INJECTION, SOLUTION INTRAMUSCULAR; INTRAVENOUS at 17:54

## 2024-04-06 NOTE — PROGRESS NOTES
[facility]        NAME: Cammy Bennett is a 44 y.o. female  : 1980    MRN: [id number]  DATE: 2024  TIME: 5:40 PM    Assessment and Plan   Sore throat [J02.9]  1. Sore throat  POCT rapid strepA    Throat culture    dexamethasone (PF) (DECADRON) injection 8 mg      Rapid strep negative, with severe throat pain, given dose of Decadron in office today.  Follow-up throat culture.  Further patient instructions as below.      Patient Instructions   Your rapid strep test was negative.  We are sending out your throat swab for culture, if this comes back with group A strep, we will call you to start antibiotics.  You can also follow your results in your MyChart, please call us with any questions.  If this remains negative, it is most likely that your symptoms are due to a virus.  As you are having severe sore throat, we gave you a dose of steroids in the office today.  As we discussed, this may cause some increased energy, anxious energy, insomnia tonight.  The goal is for it to reduce inflammation in the throat to help your symptoms.  You may use warm salt water gargles, warm tea with honey to help with throat.  May continue with Tylenol, Motrin as needed.  If your symptoms or not improving over the coming week, please follow-up with your PCP.  You have any severe symptoms such as unable to swallow, please go to the ER.    Follow up with PCP in 3-5 days.  Proceed to  ER if symptoms worsen.    If tests have been performed at [facility], our office will contact you with results if changes need to be made to the care plan discussed with you at the visit.  [facility]    Chief Complaint     Chief Complaint   Patient presents with    Sore Throat     Reports over the past 3 days. Low grade temps .          History of Present Illness       Patient presents for evaluation of sore throat.  She had bilateral eye crusting, discharge, redness starting early this past  week.  She was started on antibiotic eyedrops that she is still taking from her eye doctor.  Then about 3 days ago, started with throat pain that has been worsening.  She has had some mildly elevated temperatures, though no true fevers.  Elevated up to about 100 F.  She works as a  and has been exposed to many viral illnesses as well as some children have had strep recently.  She has been taking Motrin, but still having quite severe throat pain, feels like glass when she is swallowing.        Review of Systems   Review of Systems   All other systems reviewed and are negative.        Current Medications       Current Outpatient Medications:     albuterol (ProAir HFA) 90 mcg/act inhaler, Inhale 2 puffs every 6 (six) hours as needed for wheezing, Disp: 18 g, Rfl: 3    Cholecalciferol (VITAMIN D) 2000 units CAPS, Take 3,000 Units by mouth  , Disp: , Rfl:     fluticasone (FLONASE) 50 mcg/act nasal spray, 2 sprays into each nostril daily, Disp: 1 g, Rfl: 0    guaifenesin-codeine (GUAIFENESIN AC) 100-10 MG/5ML liquid, Take 5 mL by mouth daily at bedtime as needed for cough, Disp: 180 mL, Rfl: 0    Multiple Vitamins-Minerals (MULTIVITAMIN GUMMIES ADULT PO), Take by mouth, Disp: , Rfl:     Polyethylene Glycol 3350 (MIRALAX PO), Take 1 packet by mouth daily as needed., Disp: , Rfl:     sertraline (ZOLOFT) 25 mg tablet, Take 1 tablet (25 mg total) by mouth daily, Disp: 90 tablet, Rfl: 3    mometasone (ELOCON) 0.1 % cream, , Disp: , Rfl:     sertraline (ZOLOFT) 50 mg tablet, TAKE 1 TABLET BY MOUTH EVERY DAY (Patient not taking: Reported on 4/6/2024), Disp: 90 tablet, Rfl: 2    Current Facility-Administered Medications:     dexamethasone (PF) (DECADRON) injection 8 mg, 8 mg, Intramuscular, Once,     Current Allergies     Allergies as of 04/06/2024 - Reviewed 04/06/2024   Allergen Reaction Noted    Seasonal ic [cholestatin] Allergic Rhinitis 06/20/2014    Amoxicillin Hives 02/15/2016    Augmentin  [amoxicillin-pot clavulanate] Hives 02/15/2016    Fluoxetine Other (See Comments) 05/08/2014    Penicillins Rash and Hives 06/03/2014    Wound dressings Rash 02/08/2014            The following portions of the patient's history were reviewed and updated as appropriate: allergies, current medications, past family history, past medical history, past social history, past surgical history and problem list.     Past Medical History:   Diagnosis Date    Anxiety     last assessed - 19Jan2018    Cancer (HCC) 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    Colon polyp     Hyperlipidemia     last assessed - 12Dec2017    IBS (irritable bowel syndrome)     Low grade mucinous neoplasm of appendix     last assessed - 19Jan2018    Nausea     Normal delivery     2014 daughter    Palpitations     last assessed - 14Nov2013    Right bundle branch block     last assessed - 15Nov2013    Thyroid cyst     last assessed - 15Oct2012    Vacuum extractor delivery, delivered     2012 daughter    Varicella     childhood    Vitamin D deficiency        Past Surgical History:   Procedure Laterality Date    APPENDECTOMY      COLPOSCOPY W/ BIOPSY / CURETTAGE      Colposcopy Cervix with Biopsy(s)    LACRIMAL DUCT PROBING      surgery of the Lacrimal system    OH COLONOSCOPY FLX DX W/COLLJ SPEC WHEN PFRMD N/A 2/15/2016    Procedure: EGD AND COLONOSCOPY;  Surgeon: Ifeanyi Evans DO;  Location: [facility];  Service: General    SUBTOTAL COLECTOMY      Partial colectomy - with resection of appendix    TEAR DUCT SURGERY      TOOTH EXTRACTION      last assessed - 18Oct2015    TUMOR REMOVAL  12/2015    removed from appendix       Family History   Problem Relation Age of Onset    Heart murmur Mother         type unspecified     Cancer Mother         thyroid    Thyroid cancer Mother 49    Hyperlipidemia Father     Hypertension Father     Esophageal cancer Father     Depression Sister     No Known Problems Daughter     No Known Problems Daughter     No Known Problems Son     Arthritis  Maternal Grandmother     Coronary artery disease Maternal Grandmother     Transient ischemic attack Maternal Grandmother     Cancer Maternal Grandmother         thyroid    Hypertension Maternal Grandmother     Stroke Maternal Grandmother     Thyroid cancer Maternal Grandmother 49    No Known Problems Maternal Grandfather     Depression Paternal Grandmother     Cancer Paternal Grandfather         bowel    Heart disease Paternal Grandfather     Hyperlipidemia Paternal Grandfather     Hypertension Paternal Grandfather     Colon cancer Paternal Grandfather 81    Esophageal cancer Paternal Grandfather 92    Anxiety disorder Paternal Grandfather     Cancer Maternal Aunt         lung    Lung cancer Maternal Aunt 50    Heart defect Maternal Aunt     No Known Problems Maternal Aunt     No Known Problems Maternal Aunt     Lake syndrome Paternal Aunt     Cancer Paternal Aunt         lymphoma; unknown age    Lymphoma Paternal Aunt          Medications have been verified.        Objective   /80   Pulse 81   Temp (!) 97.4 °F (36.3 °C) (Temporal)   Resp 20   Wt 96.5 kg (212 lb 12.8 oz)   LMP 03/06/2024   SpO2 97%   BMI 33.33 kg/m²   Patient's last menstrual period was 03/06/2024.       Physical Exam     Physical Exam  Vitals and nursing note reviewed.   Constitutional:       General: She is not in acute distress.     Appearance: Normal appearance. She is not ill-appearing or toxic-appearing.   HENT:      Head: Normocephalic and atraumatic.      Right Ear: Tympanic membrane, ear canal and external ear normal.      Left Ear: Tympanic membrane, ear canal and external ear normal.      Nose: Nose normal.      Mouth/Throat:      Mouth: Mucous membranes are moist.      Pharynx: Posterior oropharyngeal erythema present.      Tonsils: No tonsillar exudate or tonsillar abscesses. 2+ on the right. 2+ on the left.      Comments: Erythema of posterior pharynx and tonsils  Eyes:      General: Lids are normal.         Right eye:  No discharge.         Left eye: No discharge.      Extraocular Movements: Extraocular movements intact.      Conjunctiva/sclera:      Right eye: Right conjunctiva is injected.      Left eye: Left conjunctiva is injected.   Cardiovascular:      Rate and Rhythm: Normal rate and regular rhythm.      Heart sounds: Normal heart sounds.   Pulmonary:      Effort: Pulmonary effort is normal. No respiratory distress.      Breath sounds: Normal breath sounds. No stridor. No wheezing, rhonchi or rales.   Lymphadenopathy:      Cervical: No cervical adenopathy.   Skin:     General: Skin is warm and dry.      Capillary Refill: Capillary refill takes less than 2 seconds.      Findings: No rash.   Neurological:      Mental Status: She is alert.      Gait: Gait normal.   Psychiatric:         Behavior: Behavior normal.

## 2024-04-06 NOTE — PATIENT INSTRUCTIONS
Your rapid strep test was negative.  We are sending out your throat swab for culture, if this comes back with group A strep, we will call you to start antibiotics.  You can also follow your results in your MyChart, please call us with any questions.  If this remains negative, it is most likely that your symptoms are due to a virus.  As you are having severe sore throat, we gave you a dose of steroids in the office today.  As we discussed, this may cause some increased energy, anxious energy, insomnia tonight.  The goal is for it to reduce inflammation in the throat to help your symptoms.  You may use warm salt water gargles, warm tea with honey to help with throat.  May continue with Tylenol, Motrin as needed.  If your symptoms or not improving over the coming week, please follow-up with your PCP.  You have any severe symptoms such as unable to swallow, please go to the ER.

## 2024-04-08 LAB — BACTERIA THROAT CULT: NORMAL

## 2024-04-12 ENCOUNTER — OFFICE VISIT (OUTPATIENT)
Dept: INTERNAL MEDICINE CLINIC | Facility: CLINIC | Age: 44
End: 2024-04-12
Payer: COMMERCIAL

## 2024-04-12 VITALS
SYSTOLIC BLOOD PRESSURE: 124 MMHG | OXYGEN SATURATION: 99 % | BODY MASS INDEX: 33.65 KG/M2 | HEIGHT: 67 IN | HEART RATE: 93 BPM | DIASTOLIC BLOOD PRESSURE: 80 MMHG | WEIGHT: 214.4 LBS

## 2024-04-12 DIAGNOSIS — J02.9 SORE THROAT: Primary | ICD-10-CM

## 2024-04-12 PROCEDURE — 99213 OFFICE O/P EST LOW 20 MIN: CPT | Performed by: INTERNAL MEDICINE

## 2024-04-12 RX ORDER — AZITHROMYCIN 250 MG/1
TABLET, FILM COATED ORAL DAILY
Qty: 6 TABLET | Refills: 0 | Status: SHIPPED | OUTPATIENT
Start: 2024-04-12 | End: 2024-04-17

## 2024-04-12 NOTE — ASSESSMENT & PLAN NOTE
See HPI for details.  Will cover with azithromycin due to patient's reported allergies.  No posterior adenopathy, ears look okay, no signs of abscess

## 2024-04-12 NOTE — PATIENT INSTRUCTIONS
Problem List Items Addressed This Visit          Surgery/Wound/Pain    Sore throat - Primary     See HPI for details.  Will cover with azithromycin due to patient's reported allergies.  No posterior adenopathy, ears look okay, no signs of abscess         Relevant Medications    azithromycin (Zithromax) 250 mg tablet

## 2024-04-12 NOTE — PROGRESS NOTES
Name: Cammy Bennett      : 1980      MRN: [id number]  Encounter Provider: Morris Pickard MD  Encounter Date: 2024   Encounter department: [facility]    Assessment & Plan     1. Sore throat  Assessment & Plan:  See HPI for details.  Will cover with azithromycin due to patient's reported allergies.  No posterior adenopathy, ears look okay, no signs of abscess    Orders:  -     azithromycin (Zithromax) 250 mg tablet; Take 2 tablets (500 mg total) by mouth daily for 1 day, THEN 1 tablet (250 mg total) daily for 4 days.           Subjective     I am seeing patient in coverage today further PCP for an acute issue.  Patient went to the urgent center 6 days ago on  for sore throat for 3 days.  Rapid strep test was done which was negative, throat culture negative for strep.  She was given a dose of steroids.  Patient also had pink eye symptoms before this and saw the eye doctor and was given eye drops for this.  She developed the sore throat after this.  Pt is a  with a lot of sick kids/  Patient coming in today for ongoing sore throat, pain in ears with swallowing.      Sore Throat   Associated symptoms include ear pain. Pertinent negatives include no congestion, coughing or shortness of breath.     Review of Systems   Constitutional:  Negative for chills, fatigue and fever.   HENT:  Positive for ear pain, postnasal drip and sore throat. Negative for congestion and sinus pressure.    Respiratory:  Negative for cough and shortness of breath.    Musculoskeletal:  Negative for arthralgias and myalgias.       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   • Colon polyp    • Hyperlipidemia     last assessed - 2017   • IBS (irritable bowel syndrome)    • Low grade mucinous neoplasm of appendix     last assessed - 2018   • Nausea    • Normal delivery     2014 daughter   • Palpitations      last assessed - 14Nov2013   • Right bundle branch block     last assessed - 15Nov2013   • Thyroid cyst     last assessed - 15Oct2012   • Vacuum extractor delivery, delivered     2012 daughter   • Varicella     childhood   • Vitamin D deficiency      Past Surgical History:   Procedure Laterality Date   • APPENDECTOMY     • COLPOSCOPY W/ BIOPSY / CURETTAGE      Colposcopy Cervix with Biopsy(s)   • LACRIMAL DUCT PROBING      surgery of the Lacrimal system   • [place] COLONOSCOPY FLX DX W/COLLJ SPEC WHEN PFRMD N/A 2/15/2016    Procedure: EGD AND COLONOSCOPY;  Surgeon: Ifeanyi Evans DO;  Location: [facility];  Service: General   • SUBTOTAL COLECTOMY      Partial colectomy - with resection of appendix   • TEAR DUCT SURGERY     • TOOTH EXTRACTION      last assessed - 18Oct2015   • TUMOR REMOVAL  12/2015    removed from appendix     Family History   Problem Relation Age of Onset   • Heart murmur Mother         type unspecified    • Cancer Mother         thyroid   • Thyroid cancer Mother 49   • Hyperlipidemia Father    • Hypertension Father    • Esophageal cancer Father    • Depression Sister    • No Known Problems Daughter    • No Known Problems Daughter    • No Known Problems Son    • Arthritis Maternal Grandmother    • Coronary artery disease Maternal Grandmother    • Transient ischemic attack Maternal Grandmother    • Cancer Maternal Grandmother         thyroid   • Hypertension Maternal Grandmother    • Stroke Maternal Grandmother    • Thyroid cancer Maternal Grandmother 49   • No Known Problems Maternal Grandfather    • Depression Paternal Grandmother    • Cancer Paternal Grandfather         bowel   • Heart disease Paternal Grandfather    • Hyperlipidemia Paternal Grandfather    • Hypertension Paternal Grandfather    • Colon cancer Paternal Grandfather 81   • Esophageal cancer Paternal Grandfather 92   • Anxiety disorder Paternal Grandfather    • Cancer Maternal Aunt         lung   • Lung cancer Maternal Aunt 50   •  Heart defect Maternal Aunt    • No Known Problems Maternal Aunt    • No Known Problems Maternal Aunt    • Lake syndrome Paternal Aunt    • Cancer Paternal Aunt         lymphoma; unknown age   • Lymphoma Paternal Aunt      Social History     Socioeconomic History   • Marital status: /Civil Union     Spouse name: None   • Number of children: None   • Years of education: None   • Highest education level: None   Occupational History   • None   Tobacco Use   • Smoking status: Never   • Smokeless tobacco: Never   Vaping Use   • Vaping status: Never Used   Substance and Sexual Activity   • Alcohol use: Yes     Comment: soc   • Drug use: No   • Sexual activity: Yes     Partners: Male     Birth control/protection: Male Sterilization   Other Topics Concern   • None   Social History Narrative    Activities: Soccer    Always uses seat belt    Drinks coffee    Exercise: Running    Exercises moderately less than 3 times a week         Social Determinants of Health     Financial Resource Strain: Not on file   Food Insecurity: Not on file   Transportation Needs: Not on file   Physical Activity: Not on file   Stress: Not on file   Social Connections: Not on file   Intimate Partner Violence: Not on file   Housing Stability: Not on file     Current Outpatient Medications on File Prior to Visit   Medication Sig   • albuterol (ProAir HFA) 90 mcg/act inhaler Inhale 2 puffs every 6 (six) hours as needed for wheezing   • Cholecalciferol (VITAMIN D) 2000 units CAPS Take 3,000 Units by mouth     • fluticasone (FLONASE) 50 mcg/act nasal spray 2 sprays into each nostril daily   • Multiple Vitamins-Minerals (MULTIVITAMIN GUMMIES ADULT PO) Take by mouth   • Polyethylene Glycol 3350 (MIRALAX PO) Take 1 packet by mouth daily as needed.   • sertraline (ZOLOFT) 25 mg tablet Take 1 tablet (25 mg total) by mouth daily   • guaifenesin-codeine (GUAIFENESIN AC) 100-10 MG/5ML liquid Take 5 mL by mouth daily at bedtime as needed for cough (Patient  "not taking: Reported on 4/12/2024)   • mometasone (ELOCON) 0.1 % cream  (Patient not taking: Reported on 4/6/2024)   • sertraline (ZOLOFT) 50 mg tablet TAKE 1 TABLET BY MOUTH EVERY DAY (Patient not taking: Reported on 4/6/2024)     Allergies   Allergen Reactions   • Seasonal Ic [Cholestatin] Allergic Rhinitis   • Amoxicillin Hives   • Augmentin [Amoxicillin-Pot Clavulanate] Hives   • Fluoxetine Other (See Comments)     Other reaction(s): bloating   • Penicillins Rash and Hives   • Wound Dressings Rash     Immunization History   Administered Date(s) Administered   • COVID-19 PFIZER VACCINE 0.3 ML IM 02/25/2021, 03/18/2021, 12/13/2021   • COVID-19 Pfizer Vac BIVALENT Lance-sucrose 12 Yr+ IM 10/22/2022   • COVID-19 Pfizer mRNA vacc PF lance-sucrose 12 yr and older (Comirnaty) 12/04/2023   • INFLUENZA 10/05/2010, 10/11/2011, 10/04/2012, 10/14/2013, 10/08/2016, 10/01/2017, 10/03/2022   • Influenza Injectable, MDCK, Preservative Free, Quadrivalent, 0.5 mL 09/18/2019   • Influenza Quadrivalent Preservative Free 3 years and older IM 10/09/2014, 10/15/2015, 10/08/2016, 09/29/2018   • Influenza, injectable, quadrivalent, preservative free 0.5 mL 10/01/2020, 10/02/2021   • Influenza, seasonal, injectable 10/01/2017   • Tdap 06/18/2012, 10/09/2014, 04/27/2018       Objective     /80 (BP Location: Left arm, Patient Position: Sitting, Cuff Size: Standard)   Pulse 93   Ht 5' 7\" (1.702 m)   Wt 97.3 kg (214 lb 6.4 oz)   LMP 03/06/2024   SpO2 99%   BMI 33.58 kg/m²     Physical Exam  Constitutional:       General: She is not in acute distress.     Appearance: She is well-developed.   HENT:      Right Ear: Tympanic membrane and ear canal normal.      Left Ear: Tympanic membrane and ear canal normal.      Mouth/Throat:      Mouth: Mucous membranes are moist.      Pharynx: Uvula midline. No oropharyngeal exudate, posterior oropharyngeal erythema or uvula swelling.      Comments: No signs of thrush  Pulmonary:      Effort: " Pulmonary effort is normal. No respiratory distress.      Breath sounds: Normal breath sounds. No stridor. No wheezing.   Musculoskeletal:      Cervical back: Normal range of motion and neck supple.   Lymphadenopathy:      Cervical: No cervical adenopathy.   Neurological:      Mental Status: She is alert.       Morris Pickard MD

## 2024-05-06 ENCOUNTER — HOSPITAL ENCOUNTER (OUTPATIENT)
Dept: RADIOLOGY | Age: 44
Discharge: HOME | End: 2024-05-06
Payer: COMMERCIAL

## 2024-05-06 VITALS — BODY MASS INDEX: 33.59 KG/M2 | HEIGHT: 67 IN | WEIGHT: 214 LBS

## 2024-05-06 DIAGNOSIS — Z12.31 VISIT FOR SCREENING MAMMOGRAM: ICD-10-CM

## 2024-05-06 PROCEDURE — 77067 SCR MAMMO BI INCL CAD: CPT

## 2024-05-06 PROCEDURE — 77063 BREAST TOMOSYNTHESIS BI: CPT

## 2024-06-18 ENCOUNTER — HOSPITAL ENCOUNTER (OUTPATIENT)
Dept: MAMMOGRAPHY | Facility: CLINIC | Age: 44
Discharge: HOME | End: 2024-06-18
Payer: COMMERCIAL

## 2024-06-18 ENCOUNTER — HOSPITAL ENCOUNTER (OUTPATIENT)
Dept: ULTRASOUND IMAGING | Facility: CLINIC | Age: 44
Discharge: HOME | End: 2024-06-18
Payer: COMMERCIAL

## 2024-06-18 VITALS — BODY MASS INDEX: 33.59 KG/M2 | WEIGHT: 214 LBS | HEIGHT: 67 IN

## 2024-06-18 DIAGNOSIS — R92.8 ABNORMAL SCREENING MAMMOGRAM: ICD-10-CM

## 2024-06-18 PROCEDURE — 77065 DX MAMMO INCL CAD UNI: CPT

## 2024-06-18 PROCEDURE — 76642 ULTRASOUND BREAST LIMITED: CPT

## 2024-06-18 PROCEDURE — G0279 TOMOSYNTHESIS, MAMMO: HCPCS

## 2024-06-19 ENCOUNTER — TELEPHONE (OUTPATIENT)
Age: 44
End: 2024-06-19

## 2024-06-19 NOTE — TELEPHONE ENCOUNTER
----- Message from Abigail Sibley DO sent at 6/19/2024  7:56 AM EDT -----  Please call the patient regarding dx mammo is normal resume annual 3d mammo

## 2024-07-09 ENCOUNTER — RA CDI HCC (OUTPATIENT)
Dept: OTHER | Facility: HOSPITAL | Age: 44
End: 2024-07-09

## 2024-07-16 ENCOUNTER — OFFICE VISIT (OUTPATIENT)
Dept: INTERNAL MEDICINE CLINIC | Facility: CLINIC | Age: 44
End: 2024-07-16
Payer: COMMERCIAL

## 2024-07-16 VITALS
DIASTOLIC BLOOD PRESSURE: 80 MMHG | HEART RATE: 70 BPM | BODY MASS INDEX: 33.09 KG/M2 | OXYGEN SATURATION: 98 % | WEIGHT: 210.8 LBS | SYSTOLIC BLOOD PRESSURE: 128 MMHG | HEIGHT: 67 IN

## 2024-07-16 DIAGNOSIS — F41.9 ANXIETY: ICD-10-CM

## 2024-07-16 DIAGNOSIS — H10.13 ALLERGIC CONJUNCTIVITIS OF BOTH EYES: Primary | ICD-10-CM

## 2024-07-16 DIAGNOSIS — Z85.09 HISTORY OF MALIGNANT NEOPLASM OF APPENDIX: ICD-10-CM

## 2024-07-16 DIAGNOSIS — E78.2 MODERATE MIXED HYPERLIPIDEMIA NOT REQUIRING STATIN THERAPY: ICD-10-CM

## 2024-07-16 DIAGNOSIS — E66.09 CLASS 1 OBESITY DUE TO EXCESS CALORIES WITHOUT SERIOUS COMORBIDITY WITH BODY MASS INDEX (BMI) OF 30.0 TO 30.9 IN ADULT: ICD-10-CM

## 2024-07-16 PROCEDURE — 99214 OFFICE O/P EST MOD 30 MIN: CPT | Performed by: INTERNAL MEDICINE

## 2024-07-16 RX ORDER — SERTRALINE HYDROCHLORIDE 25 MG/1
25 TABLET, FILM COATED ORAL DAILY
Start: 2024-07-16 | End: 2024-10-14

## 2024-07-16 RX ORDER — FEXOFENADINE HCL 180 MG/1
180 TABLET ORAL DAILY PRN
Qty: 30 TABLET | Refills: 1 | Status: SHIPPED | OUTPATIENT
Start: 2024-07-16

## 2024-07-16 NOTE — PROGRESS NOTES
Assessment/Plan:    Anxiety  Patient reports me normal situational stress overall her symptoms have improved reports me that with school usually her stress levels go up currently she is off Zoloft the patient will restart the Zoloft if she notices increase in anxiety levels with starting school she can call me if there is any change or worsening no SI    Class 1 obesity due to excess calories without serious comorbidity with body mass index (BMI) of 30.0 to 30.9 in adult  Obesity -I have counseled patient following healthy and balanced diet, I would like the patient to lose weight, I would like the patient exercise routinely; we will continue monitor the patient's progress.    History of malignant neoplasm of appendix  Currently stable and doing well status post partial colectomy being monitored through surgical oncology Dr. Ferguson    Hyperlipidemia  I have counselled the pt to follow a healthy and balanced diet ,and recommend routine exercise.  Low-cholesterol diet profile    Allergic conjunctivitis of both eyes  Patient does report to me intermittent since prior to patient primarily when she is outside she had recently had conjunctivitis treated with antibiotic drops which significantly improved she is currently noticing dry and irritated eyes intermittently which has improved with treatment with Flonase suspect allergic conjunctivitis bilateral Rx for Allegra 180 mg once a day, Flonase 2 sprays each nostril once daily for 10 days if symptoms not improved please notify me         Problem List Items Addressed This Visit        Behavioral Health    Anxiety     Patient reports me normal situational stress overall her symptoms have improved reports me that with school usually her stress levels go up currently she is off Zoloft the patient will restart the Zoloft if she notices increase in anxiety levels with starting school she can call me if there is any change or worsening no SI         Relevant Medications     sertraline (ZOLOFT) 25 mg tablet       Eye    Allergic conjunctivitis of both eyes - Primary     Patient does report to me intermittent since prior to patient primarily when she is outside she had recently had conjunctivitis treated with antibiotic drops which significantly improved she is currently noticing dry and irritated eyes intermittently which has improved with treatment with Flonase suspect allergic conjunctivitis bilateral Rx for Allegra 180 mg once a day, Flonase 2 sprays each nostril once daily for 10 days if symptoms not improved please notify me         Relevant Medications    fexofenadine (ALLEGRA) 180 MG tablet       Oncology    History of malignant neoplasm of appendix     Currently stable and doing well status post partial colectomy being monitored through surgical oncology Dr. Ferguson            Other    Hyperlipidemia     I have counselled the pt to follow a healthy and balanced diet ,and recommend routine exercise.  Low-cholesterol diet profile         Class 1 obesity due to excess calories without serious comorbidity with body mass index (BMI) of 30.0 to 30.9 in adult     Obesity -I have counseled patient following healthy and balanced diet, I would like the patient to lose weight, I would like the patient exercise routinely; we will continue monitor the patient's progress.            RTO in 6 months call with problems  Subjective:      Patient ID: Cammy Bennett is a 44 y.o. female.    HPI 44-year old female coming in for a follow up office visit regarding anxiety, class I obesity, allergic conjunctivitis bilateral, malignant neoplasm of the appendix and moderate mixed hyperlipidemia; the patient reports me compliant taking medications without untoward side effects the.  The patient is here to review his medical condition, update me on the medical condition and the patient reports me no hospitalizations and no ER visits.  No injuries no illnesses she does report to me being treated by eye  specialist for conjunctivitis she was having pasting eyes symptoms improved with antibiotic eyedrops.  She has noted irritated dry eyes intermittent worsened when going outside improved with using Flonase.  She has not been taking allergy medications regularly.  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  Her generalized anxiety disorder has been under control reports her usual uptakes are during the school year currently she has been able to wean off the Zoloft without major recurrence of symptoms she will observe if she has increased anxiety levels when going back to school she will start the Zoloft.  Diet  ok, runnning the pt reports pink eye abx  drop in April , then pred ggt and dry eye working with oph    The following portions of the patient's history were reviewed and updated as appropriate: allergies, current medications, past family history, past medical history, past social history, past surgical history and problem list.    Review of Systems   Constitutional:  Negative for activity change, appetite change and unexpected weight change.   HENT:  Negative for congestion and postnasal drip.    Eyes:  Negative for visual disturbance.   Respiratory:  Negative for cough and shortness of breath.    Cardiovascular:  Negative for chest pain.   Gastrointestinal:  Negative for abdominal pain, diarrhea, nausea and vomiting.   Neurological:  Negative for dizziness, light-headedness and headaches.   Hematological:  Negative for adenopathy.   Psychiatric/Behavioral:  Negative for suicidal ideas.          Objective:    Return in about 6 months (around 1/16/2025).    Procedure: Mammo diagnostic right w 3d & cad    Result Date: 6/18/2024  Narrative: DIAGNOSIS: Abnormal screening mammogram TECHNIQUE: Digital diagnostic mammography was performed. Computer Aided Detection (CAD) analyzed all applicable images. Right breast ultrasound was performed. COMPARISONS:  Prior breast imaging dated: 05/06/2024, 04/26/2023, 04/28/2022, and 04/14/2022 RELEVANT HISTORY: Family Breast Cancer History: No known family history of breast cancer. Family Medical History: Family medical history includes colon cancer in paternal grandfather. Personal History: Hormone history includes birth control. No known relevant surgical history. No known relevant medical history. RISK ASSESSMENT: 5 Year Tyrer-Cuzick: 0.64% 10 Year Tyrer-Cuzick: 1.57% Lifetime Tyrer-Cuzick: 10.73% TISSUE DENSITY: There are scattered areas of fibroglandular density. INDICATION: Cammy Bennett is a 44 y.o. female presenting for abnormal screening mammogram. FINDINGS: RIGHT 1) FOCAL ASYMMETRY [B]: There is a focal asymmetry seen in the upper outer quadrant of the right breast in the middle depth.  On today's diagnostic images, the asymmetry appears less conspicuous and favored to represent overlapping fibroglandular breast tissue.  Targeted ultrasound of the right breast was performed from the 9 to 10 o'clock position which demonstrated normal-appearing fibroglandular breast tissue, without any suspicious sonographic findings.     Impression: No findings of malignancy in the right breast. ASSESSMENT/BI-RADS CATEGORY: Right: 1 - Negative Overall: 1 - Negative RECOMMENDATION:      - Return to routine screening for both breasts. Workstation ID: [id number]     Procedure: US breast right limited (diagnostic)    Result Date: 6/18/2024  Narrative: DIAGNOSIS: Abnormal screening mammogram TECHNIQUE: Digital diagnostic mammography was performed. Computer Aided Detection (CAD) analyzed all applicable images. Right breast ultrasound was performed. COMPARISONS: Prior breast imaging dated: 05/06/2024, 04/26/2023, 04/28/2022, and 04/14/2022 RELEVANT HISTORY: Family Breast Cancer History: No known family history of breast cancer. Family Medical History: Family medical history includes colon cancer in paternal grandfather. Personal History:  Hormone history includes birth control. No known relevant surgical history. No known relevant medical history. RISK ASSESSMENT: 5 Year Tyrer-Cuzick: 0.64% 10 Year Tyrer-Cuzick: 1.57% Lifetime Tyrer-Cuzick: 10.73% TISSUE DENSITY: There are scattered areas of fibroglandular density. INDICATION: Cammy Bennett is a 44 y.o. female presenting for abnormal screening mammogram. FINDINGS: RIGHT 1) FOCAL ASYMMETRY [B]: There is a focal asymmetry seen in the upper outer quadrant of the right breast in the middle depth.  On today's diagnostic images, the asymmetry appears less conspicuous and favored to represent overlapping fibroglandular breast tissue.  Targeted ultrasound of the right breast was performed from the 9 to 10 o'clock position which demonstrated normal-appearing fibroglandular breast tissue, without any suspicious sonographic findings.     Impression: No findings of malignancy in the right breast. ASSESSMENT/BI-RADS CATEGORY: Right: 1 - Negative Overall: 1 - Negative RECOMMENDATION:      - Return to routine screening for both breasts. Workstation ID: [id number]        Allergies   Allergen Reactions   • Seasonal Ic [Cholestatin] Allergic Rhinitis   • Amoxicillin Hives   • Augmentin [Amoxicillin-Pot Clavulanate] Hives   • Fluoxetine Other (See Comments)     Other reaction(s): bloating   • Penicillins Rash and Hives   • Wound Dressings Rash       Past Medical History:   Diagnosis Date   • Anxiety     last assessed - 19Jan2018   • Cancer (HCC) 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   • Colon polyp    • Hyperlipidemia     last assessed - 12Dec2017   • IBS (irritable bowel syndrome)    • Low grade mucinous neoplasm of appendix     last assessed - 19Jan2018   • Nausea    • Normal delivery     2014 daughter   • Palpitations     last assessed - 14Nov2013   • Right bundle branch block     last assessed - 15Nov2013   • Thyroid cyst     last assessed - 15Oct2012   • Vacuum extractor delivery, delivered     2012 daughter   • Varicella      childhood   • Vitamin D deficiency      Past Surgical History:   Procedure Laterality Date   • APPENDECTOMY     • COLPOSCOPY W/ BIOPSY / CURETTAGE      Colposcopy Cervix with Biopsy(s)   • LACRIMAL DUCT PROBING      surgery of the Lacrimal system   • PA COLONOSCOPY FLX DX W/COLLJ SPEC WHEN PFRMD N/A 2/15/2016    Procedure: EGD AND COLONOSCOPY;  Surgeon: Ifeanyi Evans DO;  Location: [facility];  Service: General   • SUBTOTAL COLECTOMY      Partial colectomy - with resection of appendix   • TEAR DUCT SURGERY     • TOOTH EXTRACTION      last assessed - 18Oct2015   • TUMOR REMOVAL  12/2015    removed from appendix     Current Outpatient Medications on File Prior to Visit   Medication Sig Dispense Refill   • albuterol (ProAir HFA) 90 mcg/act inhaler Inhale 2 puffs every 6 (six) hours as needed for wheezing 18 g 3   • Cholecalciferol (VITAMIN D) 2000 units CAPS Take 3,000 Units by mouth       • fluticasone (FLONASE) 50 mcg/act nasal spray 2 sprays into each nostril daily 1 g 0   • Multiple Vitamins-Minerals (MULTIVITAMIN GUMMIES ADULT PO) Take by mouth     • Polyethylene Glycol 3350 (MIRALAX PO) Take 1 packet by mouth daily as needed.     • [DISCONTINUED] guaifenesin-codeine (GUAIFENESIN AC) 100-10 MG/5ML liquid Take 5 mL by mouth daily at bedtime as needed for cough (Patient not taking: Reported on 4/12/2024) 180 mL 0   • [DISCONTINUED] mometasone (ELOCON) 0.1 % cream  (Patient not taking: Reported on 4/6/2024)     • [DISCONTINUED] sertraline (ZOLOFT) 25 mg tablet Take 1 tablet (25 mg total) by mouth daily 90 tablet 3   • [DISCONTINUED] sertraline (ZOLOFT) 50 mg tablet TAKE 1 TABLET BY MOUTH EVERY DAY (Patient not taking: Reported on 4/6/2024) 90 tablet 2     No current facility-administered medications on file prior to visit.     Family History   Problem Relation Age of Onset   • Heart murmur Mother         type unspecified    • Cancer Mother         thyroid   • Thyroid cancer Mother 49   • Hyperlipidemia Father    •  Hypertension Father    • Esophageal cancer Father    • Depression Sister    • No Known Problems Daughter    • No Known Problems Daughter    • No Known Problems Son    • Arthritis Maternal Grandmother    • Coronary artery disease Maternal Grandmother    • Transient ischemic attack Maternal Grandmother    • Cancer Maternal Grandmother         thyroid   • Hypertension Maternal Grandmother    • Stroke Maternal Grandmother    • Thyroid cancer Maternal Grandmother 49   • No Known Problems Maternal Grandfather    • Depression Paternal Grandmother    • Cancer Paternal Grandfather         bowel   • Heart disease Paternal Grandfather    • Hyperlipidemia Paternal Grandfather    • Hypertension Paternal Grandfather    • Colon cancer Paternal Grandfather 81   • Esophageal cancer Paternal Grandfather 92   • Anxiety disorder Paternal Grandfather    • Cancer Maternal Aunt         lung   • Lung cancer Maternal Aunt 50   • Heart defect Maternal Aunt    • No Known Problems Maternal Aunt    • No Known Problems Maternal Aunt    • Lake syndrome Paternal Aunt    • Cancer Paternal Aunt         lymphoma; unknown age   • Lymphoma Paternal Aunt      Social History     Socioeconomic History   • Marital status: /Civil Union     Spouse name: Not on file   • Number of children: Not on file   • Years of education: Not on file   • Highest education level: Not on file   Occupational History   • Not on file   Tobacco Use   • Smoking status: Never   • Smokeless tobacco: Never   Vaping Use   • Vaping status: Never Used   Substance and Sexual Activity   • Alcohol use: Yes     Comment: soc   • Drug use: No   • Sexual activity: Yes     Partners: Male     Birth control/protection: Male Sterilization   Other Topics Concern   • Not on file   Social History Narrative    Activities: Soccer    Always uses seat belt    Drinks coffee    Exercise: Running    Exercises moderately less than 3 times a week         Social Determinants of Health     Financial  "Resource Strain: Not on file   Food Insecurity: Not on file   Transportation Needs: Not on file   Physical Activity: Not on file   Stress: Not on file   Social Connections: Not on file   Intimate Partner Violence: Not on file   Housing Stability: Not on file     Vitals:    07/16/24 1238   BP: 128/80   BP Location: Left arm   Patient Position: Sitting   Cuff Size: Standard   Pulse: 70   SpO2: 98%   Weight: 95.6 kg (210 lb 12.8 oz)   Height: 5' 7\" (1.702 m)     Results for orders placed or performed in visit on 04/06/24   Throat culture    Specimen: Throat   Result Value Ref Range    Throat Culture Negative for beta-hemolytic Streptococcus    POCT rapid strepA   Result Value Ref Range     RAPID STREP A Negative Negative     Weight (last 2 days)     Date/Time Weight    07/16/24 1238 95.6 (210.8)        Body mass index is 33.02 kg/m².  BP      Temp      Pulse     Resp      SpO2        Vitals:    07/16/24 1238   Weight: 95.6 kg (210 lb 12.8 oz)     Vitals:    07/16/24 1238   Weight: 95.6 kg (210 lb 12.8 oz)       /80 (BP Location: Left arm, Patient Position: Sitting, Cuff Size: Standard)   Pulse 70   Ht 5' 7\" (1.702 m)   Wt 95.6 kg (210 lb 12.8 oz)   SpO2 98%   BMI 33.02 kg/m²          Physical Exam  Vitals and nursing note reviewed.   Constitutional:       General: She is not in acute distress.     Appearance: Normal appearance. She is well-developed. She is obese. She is not ill-appearing, toxic-appearing or diaphoretic.   HENT:      Head: Normocephalic.      Mouth/Throat:      Mouth: Oropharynx is clear and moist.   Eyes:      General: No scleral icterus.        Right eye: No discharge.         Left eye: No discharge.      Conjunctiva/sclera: Conjunctivae normal.      Pupils: Pupils are equal, round, and reactive to light.   Cardiovascular:      Rate and Rhythm: Normal rate and regular rhythm.      Heart sounds: Normal heart sounds. No murmur heard.     No friction rub. No gallop.   Pulmonary:      Effort: " No respiratory distress.      Breath sounds: Normal breath sounds. No wheezing or rales.   Abdominal:      General: Bowel sounds are normal. There is no distension.      Palpations: Abdomen is soft. There is no mass.      Tenderness: There is no abdominal tenderness. There is no guarding or rebound.   Musculoskeletal:         General: No deformity or edema.      Cervical back: Neck supple.   Lymphadenopathy:      Cervical: No cervical adenopathy.   Neurological:      Mental Status: She is alert.      Coordination: Coordination normal.   Psychiatric:         Mood and Affect: Mood is anxious. Mood is not depressed.         Thought Content: Thought content does not include suicidal ideation.

## 2024-07-17 NOTE — ASSESSMENT & PLAN NOTE
I have counselled the pt to follow a healthy and balanced diet ,and recommend routine exercise.  Low-cholesterol diet profile

## 2024-07-17 NOTE — ASSESSMENT & PLAN NOTE
Currently stable and doing well status post partial colectomy being monitored through surgical oncology Dr. Ferguson

## 2024-07-17 NOTE — ASSESSMENT & PLAN NOTE
Patient reports me normal situational stress overall her symptoms have improved reports me that with school usually her stress levels go up currently she is off Zoloft the patient will restart the Zoloft if she notices increase in anxiety levels with starting school she can call me if there is any change or worsening no SI

## 2024-07-17 NOTE — ASSESSMENT & PLAN NOTE
Patient does report to me intermittent since prior to patient primarily when she is outside she had recently had conjunctivitis treated with antibiotic drops which significantly improved she is currently noticing dry and irritated eyes intermittently which has improved with treatment with Flonase suspect allergic conjunctivitis bilateral Rx for Allegra 180 mg once a day, Flonase 2 sprays each nostril once daily for 10 days if symptoms not improved please notify me

## 2024-08-15 PROBLEM — H10.13 ALLERGIC CONJUNCTIVITIS OF BOTH EYES: Status: RESOLVED | Noted: 2024-07-16 | Resolved: 2024-08-15

## 2024-10-17 ENCOUNTER — IMMUNIZATIONS (OUTPATIENT)
Dept: INTERNAL MEDICINE CLINIC | Facility: CLINIC | Age: 44
End: 2024-10-17
Payer: COMMERCIAL

## 2024-10-17 DIAGNOSIS — Z23 ENCOUNTER FOR ADMINISTRATION OF VACCINE: Primary | ICD-10-CM

## 2024-10-17 PROCEDURE — G0008 ADMIN INFLUENZA VIRUS VAC: HCPCS

## 2024-10-17 PROCEDURE — 90656 IIV3 VACC NO PRSV 0.5 ML IM: CPT

## 2024-10-24 ENCOUNTER — AMB VIDEO VISIT (OUTPATIENT)
Dept: OTHER | Facility: HOSPITAL | Age: 44
End: 2024-10-24
Payer: COMMERCIAL

## 2024-10-24 DIAGNOSIS — R05.1 ACUTE COUGH: Primary | ICD-10-CM

## 2024-10-24 PROCEDURE — 99212 OFFICE O/P EST SF 10 MIN: CPT | Performed by: NURSE PRACTITIONER

## 2024-10-24 RX ORDER — AZITHROMYCIN 250 MG/1
TABLET, FILM COATED ORAL
Qty: 6 TABLET | Refills: 0 | Status: SHIPPED | OUTPATIENT
Start: 2024-10-24 | End: 2024-10-28

## 2024-10-24 NOTE — CARE ANYWHERE EVISITS
Visit Summary for GUERLINE NIETO - Gender: Female - Date of Birth: 1980  Date: [id number] - Duration: 4 minutes  Patient: GUERLINE NIETO  Provider: Mario AGUERO    Patient Contact Information  Address  [address] [place] [id number]  [phone]    Visit Topics  Cold [Added By: Self - 2024-10-24]    Triage Questions   What is your current physical address in the event of a medical emergency? Answer []  Are you allergic to any medications? Answer []  Are you now or could you be pregnant? Answer []  Do you have any immune system compromise or chronic lung   disease? Answer []  Do you have any vulnerable family members in the home (infant, pregnant, cancer, elderly)? Answer []     Conversation Transcripts  [0A][0A] [Notification] You are connected with Mario AGUERO, Urgent Care Specialist.[0A][Notification] GUERLINE NIETO is located in [place].[0A][Notification] GUERLINE NIETO has shared health history...[0A]    Diagnosis  Acute cough    Procedures  Value: [id number] Code: CPT-4 UNLISTED E&M SERVICE    Medications Prescribed    No prescriptions ordered    Electronically signed by: Mario Ruffin(NPI [id number])

## 2024-10-24 NOTE — PROGRESS NOTES
Virtual Regular Visit  Name: Cammy Bennett      : 1980      MRN: [id number]  Encounter Provider: LACI Mayers  Encounter Date: 10/24/2024   Encounter department: VIRTUAL CARE     Verification of patient location:    Patient is located at Home in the following state in which I hold an active license [place]    Assessment & Plan  Acute cough    Orders:    azithromycin (ZITHROMAX) 250 mg tablet; Take 2 tablets today then 1 tablet daily x 4 days         Encounter provider LACI Mayers    The patient was identified by name and date of birth. Cammy Bennett was informed that this is a telemedicine visit and that the visit is being conducted through the [employer] platform. She agrees to proceed..  My office door was closed. No one else was in the room.  She acknowledged consent and understanding of privacy and security of the video platform. The patient has agreed to participate and understands they can discontinue the visit at any time.    Patient is aware this is a billable service.     History of Present Illness     This is a 44 year old female here today for video visit.  She states she is having cough and congestion.  She states she has had symptoms for several days.  She states she is coughing up mucous that does not taste good.  She does not her son is being treated for pneumonia.  She states she feels flushed.  She had some slight sob.  Felt chills today.          History obtained from : patient  Review of Systems   Constitutional:  Positive for activity change and fatigue.   HENT:  Positive for congestion, rhinorrhea, sinus pressure and sinus pain.    Respiratory:  Positive for cough.    Neurological: Negative.    Psychiatric/Behavioral: Negative.             Objective     There were no vitals taken for this visit.  Physical Exam  Constitutional:       General: She is not in acute distress.     Appearance: Normal appearance. She is not ill-appearing or toxic-appearing.   HENT:      Head:  Normocephalic and atraumatic.      Nose: Congestion present.   Pulmonary:      Effort: Pulmonary effort is normal. No respiratory distress.   Neurological:      Mental Status: She is alert.   Psychiatric:         Mood and Affect: Mood normal.         Behavior: Behavior normal.         Thought Content: Thought content normal.         Judgment: Judgment normal.         Visit Time  Total Visit Duration: 7

## 2024-10-25 NOTE — PATIENT INSTRUCTIONS
Rest and drink extra fluids.  Start antibiotic.  Take probiotic.  OTC cough and cold as needed.  Follow up with PCP if no improvement.  Go to ER with any worsening symptoms.

## 2024-10-29 ENCOUNTER — TELEPHONE (OUTPATIENT)
Age: 44
End: 2024-10-29

## 2024-10-29 ENCOUNTER — OFFICE VISIT (OUTPATIENT)
Dept: INTERNAL MEDICINE CLINIC | Facility: CLINIC | Age: 44
End: 2024-10-29
Payer: COMMERCIAL

## 2024-10-29 ENCOUNTER — HOSPITAL ENCOUNTER (OUTPATIENT)
Dept: RADIOLOGY | Facility: HOSPITAL | Age: 44
Discharge: HOME | End: 2024-10-29
Payer: COMMERCIAL

## 2024-10-29 VITALS
OXYGEN SATURATION: 99 % | WEIGHT: 218 LBS | SYSTOLIC BLOOD PRESSURE: 130 MMHG | DIASTOLIC BLOOD PRESSURE: 72 MMHG | BODY MASS INDEX: 34.14 KG/M2 | HEART RATE: 77 BPM

## 2024-10-29 DIAGNOSIS — J40 BRONCHITIS: ICD-10-CM

## 2024-10-29 DIAGNOSIS — J40 BRONCHITIS: Primary | ICD-10-CM

## 2024-10-29 PROCEDURE — 71046 X-RAY EXAM CHEST 2 VIEWS: CPT

## 2024-10-29 PROCEDURE — 99214 OFFICE O/P EST MOD 30 MIN: CPT | Performed by: INTERNAL MEDICINE

## 2024-10-29 RX ORDER — FLUTICASONE FUROATE 200 UG/1
1 POWDER RESPIRATORY (INHALATION) DAILY
Qty: 30 BLISTER | Refills: 0 | Status: SHIPPED | OUTPATIENT
Start: 2024-10-29 | End: 2024-11-28

## 2024-10-29 RX ORDER — FLUTICASONE FUROATE 200 UG/1
1 POWDER RESPIRATORY (INHALATION) DAILY
Qty: 30 BLISTER | Refills: 0 | Status: SHIPPED | OUTPATIENT
Start: 2024-10-29 | End: 2024-10-29 | Stop reason: SDUPTHER

## 2024-10-29 RX ORDER — BENZONATATE 100 MG/1
100 CAPSULE ORAL 3 TIMES DAILY PRN
Qty: 30 CAPSULE | Refills: 1 | Status: SHIPPED | OUTPATIENT
Start: 2024-10-29

## 2024-10-29 NOTE — TELEPHONE ENCOUNTER
Patient called stating the [facility] in [place] does not have her fluticasone (Arnuity Ellipta) 200 MCG/ACT AEPB inhaler in stock. Patient would like to know if this can be sent over to the [facility] on [facility] instead.

## 2024-10-29 NOTE — PROGRESS NOTES
Ambulatory Visit  Name: Cammy Bennett      : 1980      MRN: [id number]  Encounter Provider: Morris Pickard MD  Encounter Date: 10/29/2024   Encounter department: [facility]    Assessment & Plan  Bronchitis  With patient having rough breath sounds, continue albuterol as needed, will also add steroid inhaler to use.  Discussed with patient that steroid inhaler will take at least 6 hours to start working, but he can help with the inflammation.  Patient can continue with cough medicine, will also give prescription cough medicine, Tessalon Perles.  Will check chest x-ray.  No respiratory distress, pulse ox 99%.  Discussed the possibility of RSV which would just need to run its course and we will give supportive care for this.  Patient was treated with azithromycin, has allergies to Augmentin, so we will go with the above plan for now and not add additional antibiotics    Orders:    fluticasone (Arnuity Ellipta) 200 MCG/ACT AEPB inhaler; Inhale 1 puff daily Rinse mouth after use.    benzonatate (TESSALON PERLES) 100 mg capsule; Take 1 capsule (100 mg total) by mouth 3 (three) times a day as needed for cough    XR chest pa and lateral; Future         History of Present Illness     Patient had a video visit done 2024 for cough, chest congestion.  Pt was treated with Azithromycin, and was using albuterol.  Patient feels like coughing has not improved.  Patient reports having a fever about 3 days ago.  Unsure if discolored mucus, but reports a foul taste to the mucus when she coughs.  No sore throat, no flulike aches, no headache.  Pt reports her soon had pneumonia 10/7 and that he went back to hospital a few days ago.        Review of Systems   Constitutional:  Positive for fever.   HENT:  Negative for sore throat.    Respiratory:  Positive for cough and shortness of breath.      Past Medical History:   Diagnosis Date    Anxiety     last assessed - 2018    Cancer (HCC) 2015     appendix    Colon polyp     Hyperlipidemia     last assessed - 12Dec2017    IBS (irritable bowel syndrome)     Low grade mucinous neoplasm of appendix     last assessed - 19Jan2018    Nausea     Normal delivery     2014 daughter    Palpitations     last assessed - 14Nov2013    Right bundle branch block     last assessed - 15Nov2013    Thyroid cyst     last assessed - 15Oct2012    Vacuum extractor delivery, delivered     2012 daughter    Varicella     childhood    Vitamin D deficiency      Past Surgical History:   Procedure Laterality Date    APPENDECTOMY      COLPOSCOPY W/ BIOPSY / CURETTAGE      Colposcopy Cervix with Biopsy(s)    LACRIMAL DUCT PROBING      surgery of the Lacrimal system    OK COLONOSCOPY FLX DX W/COLLJ SPEC WHEN PFRMD N/A 2/15/2016    Procedure: EGD AND COLONOSCOPY;  Surgeon: Ifeanyi Evans DO;  Location: [facility];  Service: General    SUBTOTAL COLECTOMY      Partial colectomy - with resection of appendix    TEAR DUCT SURGERY      TOOTH EXTRACTION      last assessed - 18Oct2015    TUMOR REMOVAL  12/2015    removed from appendix     Family History   Problem Relation Age of Onset    Heart murmur Mother         type unspecified     Cancer Mother         thyroid    Thyroid cancer Mother 49    Hyperlipidemia Father     Hypertension Father     Esophageal cancer Father     Depression Sister     No Known Problems Daughter     No Known Problems Daughter     No Known Problems Son     Arthritis Maternal Grandmother     Coronary artery disease Maternal Grandmother     Transient ischemic attack Maternal Grandmother     Cancer Maternal Grandmother         thyroid    Hypertension Maternal Grandmother     Stroke Maternal Grandmother     Thyroid cancer Maternal Grandmother 49    No Known Problems Maternal Grandfather     Depression Paternal Grandmother     Cancer Paternal Grandfather         bowel    Heart disease Paternal Grandfather     Hyperlipidemia Paternal Grandfather     Hypertension Paternal  Grandfather     Colon cancer Paternal Grandfather 81    Esophageal cancer Paternal Grandfather 92    Anxiety disorder Paternal Grandfather     Cancer Maternal Aunt         lung    Lung cancer Maternal Aunt 50    Heart defect Maternal Aunt     No Known Problems Maternal Aunt     No Known Problems Maternal Aunt     Lake syndrome Paternal Aunt     Cancer Paternal Aunt         lymphoma; unknown age    Lymphoma Paternal Aunt      Social History     Tobacco Use    Smoking status: Never    Smokeless tobacco: Never   Vaping Use    Vaping status: Never Used   Substance and Sexual Activity    Alcohol use: Yes     Comment: soc    Drug use: No    Sexual activity: Yes     Partners: Male     Birth control/protection: Male Sterilization     Current Outpatient Medications on File Prior to Visit   Medication Sig    albuterol (ProAir HFA) 90 mcg/act inhaler Inhale 2 puffs every 6 (six) hours as needed for wheezing    Cholecalciferol (VITAMIN D) 2000 units CAPS Take 3,000 Units by mouth      fexofenadine (ALLEGRA) 180 MG tablet Take 1 tablet (180 mg total) by mouth daily as needed (allegies)    fluticasone (FLONASE) 50 mcg/act nasal spray 2 sprays into each nostril daily    Multiple Vitamins-Minerals (MULTIVITAMIN GUMMIES ADULT PO) Take by mouth    Polyethylene Glycol 3350 (MIRALAX PO) Take 1 packet by mouth daily as needed.    [] azithromycin (ZITHROMAX) 250 mg tablet Take 2 tablets today then 1 tablet daily x 4 days    sertraline (ZOLOFT) 25 mg tablet Take 1 tablet (25 mg total) by mouth daily     Allergies   Allergen Reactions    Seasonal Ic [Cholestatin] Allergic Rhinitis    Amoxicillin Hives    Augmentin [Amoxicillin-Pot Clavulanate] Hives    Fluoxetine Other (See Comments)     Other reaction(s): bloating    Penicillins Rash and Hives    Wound Dressings Rash     Immunization History   Administered Date(s) Administered    COVID-19 PFIZER VACCINE 0.3 ML IM 2021, 2021, 2021    COVID-19 Pfizer Vac BIVALENT  Lance-sucrose 12 Yr+ IM 10/22/2022    COVID-19 Pfizer mRNA vacc PF lance-sucrose 12 yr and older (Comirnaty) 12/04/2023    Hep B, Adolescent or Pediatric 02/08/2000, 03/14/2000, 08/16/2000    INFLUENZA 10/05/2010, 10/11/2011, 10/04/2012, 10/14/2013, 10/08/2016, 10/01/2017, 10/03/2022, 12/04/2023    Influenza Injectable, MDCK, Preservative Free, Quadrivalent, 0.5 mL 09/18/2019    Influenza Quadrivalent Preservative Free 3 years and older IM 10/09/2014, 10/15/2015, 10/08/2016, 09/29/2018    Influenza, injectable, quadrivalent, preservative free 0.5 mL 10/01/2020, 10/02/2021    Influenza, seasonal, injectable 10/01/2017    Influenza, seasonal, injectable, preservative free 10/17/2024    Tdap 06/18/2012, 10/09/2014, 04/27/2018     Objective     /72 (BP Location: Left arm, Patient Position: Sitting, Cuff Size: Standard)   Pulse 77   Wt 98.9 kg (218 lb)   SpO2 99%   BMI 34.14 kg/m²     Physical Exam  Vitals reviewed.   Constitutional:       General: She is not in acute distress.     Appearance: Normal appearance.   Pulmonary:      Effort: Pulmonary effort is normal. No respiratory distress.      Breath sounds: No rhonchi.      Comments: Slightly rough breath sounds bilaterally  Neurological:      Mental Status: She is alert.

## 2024-10-29 NOTE — ASSESSMENT & PLAN NOTE
With patient having rough breath sounds, continue albuterol as needed, will also add steroid inhaler to use.  Discussed with patient that steroid inhaler will take at least 6 hours to start working, but he can help with the inflammation.  Patient can continue with cough medicine, will also give prescription cough medicine, Tessalon Perles.  Will check chest x-ray.  No respiratory distress, pulse ox 99%.  Discussed the possibility of RSV which would just need to run its course and we will give supportive care for this.  Patient was treated with azithromycin, has allergies to Augmentin, so we will go with the above plan for now and not add additional antibiotics    Orders:    fluticasone (Arnuity Ellipta) 200 MCG/ACT AEPB inhaler; Inhale 1 puff daily Rinse mouth after use.    benzonatate (TESSALON PERLES) 100 mg capsule; Take 1 capsule (100 mg total) by mouth 3 (three) times a day as needed for cough    XR chest pa and lateral; Future

## 2024-10-29 NOTE — PATIENT INSTRUCTIONS
Problem List Items Addressed This Visit          Respiratory    Bronchitis - Primary     With patient having rough breath sounds, continue albuterol as needed, will also add steroid inhaler to use.  Discussed with patient that steroid inhaler will take at least 6 hours to start working, but he can help with the inflammation.  Patient can continue with cough medicine, will also give prescription cough medicine, Tessalon Perles.  Will check chest x-ray.  No respiratory distress, pulse ox 99%.  Discussed the possibility of RSV which would just need to run its course and we will give supportive care for this.  Patient was treated with azithromycin, has allergies to Augmentin, so we will go with the above plan for now and not add additional antibiotics         Relevant Medications    fluticasone (Arnuity Ellipta) 200 MCG/ACT AEPB inhaler    benzonatate (TESSALON PERLES) 100 mg capsule    Other Relevant Orders    XR chest pa and lateral

## 2024-10-30 ENCOUNTER — ANNUAL EXAM (OUTPATIENT)
Dept: OBGYN CLINIC | Facility: CLINIC | Age: 44
End: 2024-10-30
Payer: COMMERCIAL

## 2024-10-30 VITALS
DIASTOLIC BLOOD PRESSURE: 72 MMHG | SYSTOLIC BLOOD PRESSURE: 126 MMHG | HEIGHT: 68 IN | WEIGHT: 219.4 LBS | BODY MASS INDEX: 33.25 KG/M2

## 2024-10-30 DIAGNOSIS — Z12.31 ENCOUNTER FOR SCREENING MAMMOGRAM FOR MALIGNANT NEOPLASM OF BREAST: ICD-10-CM

## 2024-10-30 DIAGNOSIS — N92.0 MENORRHAGIA WITH REGULAR CYCLE: ICD-10-CM

## 2024-10-30 DIAGNOSIS — Z01.419 ENCOUNTER FOR ANNUAL ROUTINE GYNECOLOGICAL EXAMINATION: Primary | ICD-10-CM

## 2024-10-30 PROCEDURE — S0612 ANNUAL GYNECOLOGICAL EXAMINA: HCPCS | Performed by: OBSTETRICS & GYNECOLOGY

## 2024-10-30 RX ORDER — TRANEXAMIC ACID 650 MG/1
1300 TABLET ORAL 3 TIMES DAILY
Qty: 30 TABLET | Refills: 0 | Status: SHIPPED | OUTPATIENT
Start: 2024-10-30 | End: 2024-11-04

## 2024-10-30 NOTE — PROGRESS NOTES
Ambulatory Visit  Name: Cammy Bennett      : 1980      MRN: [id number]  Encounter Provider: Abigail Sibley DO  Encounter Date: 10/30/2024   Encounter department: [facility]    Assessment & Plan  Encounter for annual routine gynecological examination         Encounter for screening mammogram for malignant neoplasm of breast         Menorrhagia with regular cycle    Orders:    Tranexamic Acid 650 MG TABS; Take 2 tablets (1,300 mg total) by mouth 3 (three) times a day for 5 days    Pap smear deferred due to low risk status.  Encouraged self breast examination as well as calcium supplementation.  Continue annual mammogram.  Continue colonoscopy every 5 years, history of borderline appendiceal cancer and continues to follow-up with surgical oncology every 2 years, CT scan every 2 years.  Reviewed menstrual diary.  Discussed treatment options for menorrhagia.  She will try TXA with her next cycle and call with update.  Return to office in 1 year or as needed    History of Present Illness     Cammy Bennett is a 44 y.o. female who presents     This is a pleasant 44-year-old female P3 ( x 3, age 14, 9, 8) presents for her GYN exam.  She states her menstrual cycles are regular every 4 weeks lasting 5 to 6 days described as very heavy day 2/3.  Recent hemoglobin is normal.  She denies any breakthrough bleeding.  No changes in bowel or bladder function.  She has been in a monogamous relationship with her  for over 14 years.  GYN history significant for LEEP procedure in her 20s.  Pap smears have been normal since then, last Pap .  Method of contraception is vasectomy.    Dx mammo and US 2024 nl, scheduled 2024 Hb= 12.9    2023 pap nl    Colon 2021 f/u 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, followed up with surgical oncology   H/o appendiceal ca     History obtained from : patient  Review of Systems   Constitutional:  Negative for fatigue, fever  "and unexpected weight change.   Respiratory:  Negative for cough, chest tightness, shortness of breath and wheezing.    Cardiovascular: Negative.  Negative for chest pain and palpitations.   Gastrointestinal: Negative.  Negative for abdominal distention, abdominal pain, blood in stool, constipation, diarrhea, nausea and vomiting.   Genitourinary: Negative.  Negative for difficulty urinating, dyspareunia, dysuria, flank pain, frequency, genital sores, hematuria, pelvic pain, urgency, vaginal bleeding, vaginal discharge and vaginal pain.   Skin:  Negative for rash.     Current Outpatient Medications on File Prior to Visit   Medication Sig Dispense Refill    albuterol (ProAir HFA) 90 mcg/act inhaler Inhale 2 puffs every 6 (six) hours as needed for wheezing 18 g 3    benzonatate (TESSALON PERLES) 100 mg capsule Take 1 capsule (100 mg total) by mouth 3 (three) times a day as needed for cough 30 capsule 1    Cholecalciferol (VITAMIN D) 2000 units CAPS Take 3,000 Units by mouth        fexofenadine (ALLEGRA) 180 MG tablet Take 1 tablet (180 mg total) by mouth daily as needed (allegies) 30 tablet 1    fluticasone (Arnuity Ellipta) 200 MCG/ACT AEPB inhaler Inhale 1 puff daily Rinse mouth after use. 30 blister 0    fluticasone (FLONASE) 50 mcg/act nasal spray 2 sprays into each nostril daily 1 g 0    Multiple Vitamins-Minerals (MULTIVITAMIN GUMMIES ADULT PO) Take by mouth      Polyethylene Glycol 3350 (MIRALAX PO) Take 1 packet by mouth daily as needed.      sertraline (ZOLOFT) 25 mg tablet Take 1 tablet (25 mg total) by mouth daily       No current facility-administered medications on file prior to visit.          Objective     /72   Ht 5' 8\" (1.727 m)   Wt 99.5 kg (219 lb 6.4 oz)   LMP 10/19/2024 (Approximate)   BMI 33.36 kg/m²     Physical Exam  Constitutional:       Appearance: Normal appearance. She is well-developed.   HENT:      Head: Normocephalic and atraumatic.   Cardiovascular:      Rate and Rhythm: Normal " rate and regular rhythm.   Pulmonary:      Effort: Pulmonary effort is normal.      Breath sounds: Normal breath sounds.   Chest:   Breasts:     Right: No inverted nipple, mass, nipple discharge, skin change or tenderness.      Left: No inverted nipple, mass, nipple discharge, skin change or tenderness.   Abdominal:      General: Bowel sounds are normal. There is no distension.      Palpations: Abdomen is soft.      Tenderness: There is no abdominal tenderness. There is no guarding or rebound.   Genitourinary:     Labia:         Right: No rash, tenderness or lesion.         Left: No rash, tenderness or lesion.       Vagina: Normal. No signs of injury. No vaginal discharge or tenderness.      Cervix: No cervical motion tenderness, discharge, friability, lesion or cervical bleeding.      Uterus: Not enlarged and not tender.       Adnexa:         Right: No mass, tenderness or fullness.          Left: No mass, tenderness or fullness.     Neurological:      Mental Status: She is alert.   Psychiatric:         Behavior: Behavior normal.       Administrative Statements   I have spent a total time of 30 minutes in caring for this patient on the day of the visit/encounter including Prognosis, Risks and benefits of tx options, Instructions for management, Impressions, Counseling / Coordination of care, Documenting in the medical record, Reviewing / ordering tests, medicine, procedures  , and Obtaining or reviewing history  .

## 2025-01-22 ENCOUNTER — OFFICE VISIT (OUTPATIENT)
Dept: INTERNAL MEDICINE CLINIC | Facility: CLINIC | Age: 45
End: 2025-01-22
Payer: COMMERCIAL

## 2025-01-22 VITALS
DIASTOLIC BLOOD PRESSURE: 80 MMHG | BODY MASS INDEX: 33.75 KG/M2 | OXYGEN SATURATION: 98 % | SYSTOLIC BLOOD PRESSURE: 110 MMHG | HEART RATE: 72 BPM | WEIGHT: 222 LBS

## 2025-01-22 DIAGNOSIS — E78.2 MODERATE MIXED HYPERLIPIDEMIA NOT REQUIRING STATIN THERAPY: Primary | ICD-10-CM

## 2025-01-22 DIAGNOSIS — E66.09 CLASS 1 OBESITY DUE TO EXCESS CALORIES WITHOUT SERIOUS COMORBIDITY WITH BODY MASS INDEX (BMI) OF 30.0 TO 30.9 IN ADULT: ICD-10-CM

## 2025-01-22 DIAGNOSIS — Z00.00 WELLNESS EXAMINATION: ICD-10-CM

## 2025-01-22 DIAGNOSIS — F41.9 ANXIETY: ICD-10-CM

## 2025-01-22 DIAGNOSIS — Z13.1 SCREENING FOR DIABETES MELLITUS: ICD-10-CM

## 2025-01-22 DIAGNOSIS — E66.811 CLASS 1 OBESITY DUE TO EXCESS CALORIES WITHOUT SERIOUS COMORBIDITY WITH BODY MASS INDEX (BMI) OF 30.0 TO 30.9 IN ADULT: ICD-10-CM

## 2025-01-22 PROCEDURE — 99213 OFFICE O/P EST LOW 20 MIN: CPT | Performed by: INTERNAL MEDICINE

## 2025-01-22 PROCEDURE — 99396 PREV VISIT EST AGE 40-64: CPT | Performed by: INTERNAL MEDICINE

## 2025-01-22 NOTE — ASSESSMENT & PLAN NOTE
Low-cholesterol diet check lipid profile continue to optimize diet and routine exercise advised  Orders:  •  Comprehensive metabolic panel; Future  •  Hemoglobin A1C; Future  •  Lipid Panel with Direct LDL reflex; Future

## 2025-01-22 NOTE — PROGRESS NOTES
Adult Annual Physical  Name: Cammy Bennett      : 1980      MRN: [id number]  Encounter Provider: Kota Morin DO  Encounter Date: 2025   [facility]    Assessment & Plan  Moderate mixed hyperlipidemia not requiring statin therapy  Low-cholesterol diet check lipid profile continue to optimize diet and routine exercise advised  Orders:  •  Comprehensive metabolic panel; Future  •  Hemoglobin A1C; Future  •  Lipid Panel with Direct LDL reflex; Future    Screening for diabetes mellitus  I have counselled the pt to follow a healthy and balanced diet ,and recommend routine exercise.  Orders:  •  Comprehensive metabolic panel; Future  •  Hemoglobin A1C; Future  •  Lipid Panel with Direct LDL reflex; Future    Wellness examination  Assessment and plan 1.  Health maintenance annual wellness examination overall the patient is clinically stable and doing well, we encouraged the patient to follow a healthy and balanced diet.  We recommend that the patient exercise routinely approximately 30 minutes 5 times per week .  We have reviewed the patient's vaccines and have made recommendations for updates if necessary consider flu shot    .  We will be ordering screening laboratories which are age appropriate.  Return to the office in    6 months call if any problems.       Class 1 obesity due to excess calories without serious comorbidity with body mass index (BMI) of 30.0 to 30.9 in adult  Obesity -I have counseled patient following healthy and balanced diet, I would like the patient to lose weight, I would like the patient exercise routinely; we will continue monitor the patient's progress.       Anxiety  Currently stable and doing well she is currently off the Zoloft we will keep the medication on the active list the patient would prefer in case she has recurrence of symptoms if she has any problems she can notify me     RTO in 6 months call if any  problems  Immunizations and preventive care screenings were discussed with patient today. Appropriate education was printed on patient's after visit summary.    Counseling:  Exercise: the importance of regular exercise/physical activity was discussed. Recommend exercise 3-5 times per week for at least 30 minutes.          History of Present Illness 44-year old female coming in for a follow up office visit regarding hyperlipidemia, class I obesity, anxiety; the patient reports me compliant taking medications without untoward side effects the.  The patient is here to review his medical condition, update me on the medical condition and the patient reports me no hospitalizations and no ER visits.  No injuries no illnesses overall doing very well.  Reviewed laboratories in detail trying to follow healthy balanced diet exercises routinely reports me anxiety levels are doing well currently not on Zoloft.  She is very busy with the kids activities and working.  The patient does have a screening mammogram ordered through her OB/GYN and a CAT scan of the abdomen and pelvis through her surgical oncologist for surveillance monitoring.    Adult Annual Physical:  Patient presents for annual physical.     Diet and Physical Activity:  - Diet/Nutrition: well balanced diet.  - Exercise: 3-4 times a week on average, vigorous cardiovascular exercise and 30-60 minutes on average.    Depression Screening:  - PHQ-2 Score: 0    General Health:  - Sleep: sleeps well and 7-8 hours of sleep on average.  - Hearing: normal hearing right ear and normal hearing left ear.  - Vision: no vision problems.  - Dental: brushes teeth twice daily and regular dental visits.    /GYN Health:  - Follows with GYN: yes.   - Menopause: premenopausal.     Advanced Care Planning:  - Has an advanced directive?: yes    - Has a durable medical POA?: yes      Review of Systems   Constitutional:  Negative for activity change, appetite change and unexpected weight  change.   HENT:  Negative for congestion and postnasal drip.    Eyes:  Negative for visual disturbance.   Respiratory:  Negative for cough and shortness of breath.    Cardiovascular:  Negative for chest pain.   Gastrointestinal:  Negative for abdominal pain, diarrhea, nausea and vomiting.   Neurological:  Negative for dizziness, light-headedness and headaches.   Hematological:  Negative for adenopathy.     Social History     Tobacco Use   • Smoking status: Never   • Smokeless tobacco: Never   Vaping Use   • Vaping status: Never Used   Substance and Sexual Activity   • Alcohol use: Yes     Comment: soc   • Drug use: No   • Sexual activity: Yes     Partners: Male     Birth control/protection: Male Sterilization       Objective   /80 (BP Location: Left arm, Patient Position: Sitting, Cuff Size: Large)   Pulse 72   Wt 101 kg (222 lb)   SpO2 98%   BMI 33.75 kg/m²     Physical Exam  Constitutional:       Appearance: She is well-developed.   HENT:      Head: Normocephalic and atraumatic.      Right Ear: External ear normal.      Left Ear: External ear normal.      Nose: Nose normal.   Eyes:      Pupils: Pupils are equal, round, and reactive to light.   Cardiovascular:      Rate and Rhythm: Normal rate and regular rhythm.      Heart sounds: Normal heart sounds. No murmur heard.  Pulmonary:      Effort: Pulmonary effort is normal.      Breath sounds: Normal breath sounds.   Abdominal:      General: There is no distension.      Palpations: Abdomen is soft.      Tenderness: There is no abdominal tenderness. There is no guarding.

## 2025-01-23 NOTE — ASSESSMENT & PLAN NOTE
Currently stable and doing well she is currently off the Zoloft we will keep the medication on the active list the patient would prefer in case she has recurrence of symptoms if she has any problems she can notify me     RTO in 6 months call if any problems

## 2025-01-23 NOTE — ASSESSMENT & PLAN NOTE
Assessment and plan 1.  Health maintenance annual wellness examination overall the patient is clinically stable and doing well, we encouraged the patient to follow a healthy and balanced diet.  We recommend that the patient exercise routinely approximately 30 minutes 5 times per week .  We have reviewed the patient's vaccines and have made recommendations for updates if necessary consider flu shot    .  We will be ordering screening laboratories which are age appropriate.  Return to the office in    6 months call if any problems.

## 2025-03-20 ENCOUNTER — TELEPHONE (OUTPATIENT)
Age: 45
End: 2025-03-20

## 2025-03-20 NOTE — TELEPHONE ENCOUNTER
Patient called wanting to schedule an appt with Dr Morin for Monday to discuss increased anxiety. I did not see anything available in the near future with him. I informed patient I would send a message to the office to see if there was anything that could be done or if she could possibly be accommodated. Please call patient back.

## 2025-03-24 ENCOUNTER — OFFICE VISIT (OUTPATIENT)
Dept: INTERNAL MEDICINE CLINIC | Facility: CLINIC | Age: 45
End: 2025-03-24
Payer: COMMERCIAL

## 2025-03-24 VITALS
SYSTOLIC BLOOD PRESSURE: 106 MMHG | BODY MASS INDEX: 33.04 KG/M2 | HEIGHT: 68 IN | DIASTOLIC BLOOD PRESSURE: 60 MMHG | WEIGHT: 218 LBS | TEMPERATURE: 98.7 F | OXYGEN SATURATION: 99 % | HEART RATE: 78 BPM

## 2025-03-24 DIAGNOSIS — F41.9 ANXIETY: ICD-10-CM

## 2025-03-24 PROCEDURE — 99213 OFFICE O/P EST LOW 20 MIN: CPT

## 2025-03-24 RX ORDER — SERTRALINE HYDROCHLORIDE 25 MG/1
50 TABLET, FILM COATED ORAL DAILY
Qty: 120 TABLET | Refills: 2 | Status: SHIPPED | OUTPATIENT
Start: 2025-03-24 | End: 2025-06-22

## 2025-03-24 NOTE — LETTER
March 24, 2025     Patient: Cammy Bennett  YOB: 1980  Date of Visit: 3/24/2025      To Whom it May Concern:    Cammy Bennett is under my professional care. Cammy was seen in my office on 3/24/2025. Cammy may return to work on 3/26/25 .    If you have any questions or concerns, please don't hesitate to call.         Sincerely,          Tejal Turner MD        CC: No Recipients

## 2025-03-24 NOTE — LETTER
March 24, 2025     Patient: Cammy Bennett  YOB: 1980  Date of Visit: 3/24/2025      To Whom it May Concern:    Cammy Bennett is under my professional care. Cammy was seen in my office on 3/24/2025. Cammy may return to work on 3/31/25 .    If you have any questions or concerns, please don't hesitate to call.         Sincerely,          Tejal Turner MD        CC: No Recipients

## 2025-03-25 NOTE — PROGRESS NOTES
Name: Cammy Bennett      : 1980      MRN: [id number]  Encounter Provider: Tejal Turner MD  Encounter Date: 3/24/2025   [facility]: [facility]  :  Assessment & Plan  Anxiety  LOUIE SCORE 15  Recent worsening of anxiety due to stress at work and recent passing of grandmother  No suicidal ideations or self harm  Was off zoloft for 6-8 months but restarted zoloft 25 mg daily 2 weeks bk w/o significant improvement   reports that zoloft 50 mg have helped her with anxiety in the past and would like to try that.  Plan  Zoloft 50 mg daily- counseled that SSRIs may take upto 4-6 weeks for its effect  Follow up in 4 weeks   Pt not interested in counseling at this time         Orders:    sertraline (ZOLOFT) 25 mg tablet; Take 2 tablets (50 mg total) by mouth daily           History of Present Illness   HPI  44 y old F here for follow up of anxiety. Patient ha sh/o anxiety in the past , has taken zoloft in the past , tolerated it well . Patient has been off zoloft for the past 8 months as the anxiety was under control. BUT recently due to stress at her job , grandmothers passing recently she's finsing it difficult to deal with anxiety. She restarted taking zoloft 25 mg daily 2-3 weeks back without significant improvement in sx. She reported that zoloft 50 mg have helped her with anxiety in the past and would like to try that.  Review of Systems   Constitutional:  Negative for chills and fever.   HENT:  Negative for sore throat.    Eyes:  Negative for visual disturbance.   Respiratory:  Negative for cough and shortness of breath.    Cardiovascular:  Negative for chest pain and palpitations.   Gastrointestinal:  Negative for abdominal pain, constipation, diarrhea, nausea and vomiting.   Genitourinary:  Negative for frequency, hematuria and urgency.   Neurological:  Negative for headaches.   Psychiatric/Behavioral:  Positive for decreased concentration. The patient is  "nervous/anxious.        Objective   /60 (BP Location: Left arm, Patient Position: Sitting, Cuff Size: Large)   Pulse 78   Temp 98.7 °F (37.1 °C) (Tympanic)   Ht 5' 8\" (1.727 m)   Wt 98.9 kg (218 lb)   SpO2 99%   BMI 33.15 kg/m²      Physical Exam  Constitutional:       Appearance: Normal appearance. She is obese.   HENT:      Mouth/Throat:      Mouth: Mucous membranes are moist.   Eyes:      Extraocular Movements: Extraocular movements intact.      Pupils: Pupils are equal, round, and reactive to light.   Cardiovascular:      Rate and Rhythm: Normal rate and regular rhythm.      Pulses: Normal pulses.      Heart sounds: Normal heart sounds.   Pulmonary:      Effort: Pulmonary effort is normal.      Breath sounds: Normal breath sounds.   Abdominal:      General: Abdomen is flat.      Palpations: Abdomen is soft.   Musculoskeletal:         General: Normal range of motion.   Skin:     General: Skin is warm.   Neurological:      Mental Status: She is alert and oriented to person, place, and time. Mental status is at baseline.   Psychiatric:         Mood and Affect: Mood normal.         Behavior: Behavior normal.         "

## 2025-03-25 NOTE — ASSESSMENT & PLAN NOTE
LOUIE SCORE 15  Recent worsening of anxiety due to stress at work and recent passing of grandmother  No suicidal ideations or self harm  Was off zoloft for 6-8 months but restarted zoloft 25 mg daily 2 weeks bk w/o significant improvement   reports that zoloft 50 mg have helped her with anxiety in the past and would like to try that.  Plan  Zoloft 50 mg daily- counseled that SSRIs may take upto 4-6 weeks for its effect  Follow up in 4 weeks   Pt not interested in counseling at this time         Orders:    sertraline (ZOLOFT) 25 mg tablet; Take 2 tablets (50 mg total) by mouth daily

## 2025-04-16 DIAGNOSIS — F41.9 ANXIETY: ICD-10-CM

## 2025-04-17 RX ORDER — SERTRALINE HYDROCHLORIDE 25 MG/1
50 TABLET, FILM COATED ORAL DAILY
Qty: 180 TABLET | Refills: 1 | Status: SHIPPED | OUTPATIENT
Start: 2025-04-17

## 2025-04-28 ENCOUNTER — OFFICE VISIT (OUTPATIENT)
Dept: INTERNAL MEDICINE CLINIC | Facility: CLINIC | Age: 45
End: 2025-04-28
Payer: COMMERCIAL

## 2025-04-28 VITALS
OXYGEN SATURATION: 98 % | WEIGHT: 222.8 LBS | DIASTOLIC BLOOD PRESSURE: 68 MMHG | BODY MASS INDEX: 33.88 KG/M2 | SYSTOLIC BLOOD PRESSURE: 124 MMHG | HEART RATE: 75 BPM

## 2025-04-28 DIAGNOSIS — L30.9 HAND ECZEMA: Primary | ICD-10-CM

## 2025-04-28 DIAGNOSIS — F41.9 ANXIETY: ICD-10-CM

## 2025-04-28 PROCEDURE — 99213 OFFICE O/P EST LOW 20 MIN: CPT

## 2025-04-28 RX ORDER — SERTRALINE HYDROCHLORIDE 25 MG/1
50 TABLET, FILM COATED ORAL DAILY
Qty: 180 TABLET | Refills: 2 | Status: SHIPPED | OUTPATIENT
Start: 2025-04-28

## 2025-04-28 RX ORDER — TRIAMCINOLONE ACETONIDE 1 MG/G
CREAM TOPICAL 2 TIMES DAILY
Qty: 15 G | Refills: 0 | Status: SHIPPED | OUTPATIENT
Start: 2025-04-28 | End: 2025-05-03

## 2025-04-28 RX ORDER — AMMONIUM LACTATE 12 G/100G
LOTION TOPICAL 2 TIMES DAILY PRN
Qty: 396 G | Refills: 0 | Status: SHIPPED | OUTPATIENT
Start: 2025-04-28

## 2025-04-28 NOTE — PATIENT INSTRUCTIONS
Start applying kenalog cream twice  a day on fingers for not more than 5 days  Start using lac hydrin twice a day

## 2025-04-28 NOTE — ASSESSMENT & PLAN NOTE
History of anxiety  Restarted on Zoloft 50 mg 5 weeks back  Tolerating it well, anxiety under control currently   no suicidal ideation  Restarted running and exercising  Plan   continue Zoloft 50 mg daily  Follow-up in 6 months   Not interested in counseling at this time            Orders:    sertraline (ZOLOFT) 25 mg tablet; Take 2 tablets (50 mg total) by mouth daily

## 2025-04-28 NOTE — ASSESSMENT & PLAN NOTE
Complains of dry patches with redness in the fingers bilaterally   Washes hands frequently at work with sanitizers and soap  History of hand eczema in the past with improvement with low-dose steroids  The dry patches are secondary to hand eczema probably  Plan  Lac-Hydrin twice daily topically  Kenalog cream twice a day for 5 days  Counseled regarding using soaps with moisturizer when washing hands    Orders:    ammonium lactate (LAC-HYDRIN) 12 % lotion; Apply topically 2 (two) times a day as needed for dry skin    triamcinolone (KENALOG) 0.1 % cream; Apply topically 2 (two) times a day for 5 days

## 2025-04-28 NOTE — PROGRESS NOTES
Name: Cammy Bennett      : 1980      MRN: [id number]  Encounter Provider: Tejal Turner MD  Encounter Date: 2025   [facility] department: [facility]  :  Assessment & Plan  Hand eczema  Complains of dry patches with redness in the fingers bilaterally   Washes hands frequently at work with sanitizers and soap  History of hand eczema in the past with improvement with low-dose steroids  The dry patches are secondary to hand eczema probably  Plan  Lac-Hydrin twice daily topically  Kenalog cream twice a day for 5 days  Counseled regarding using soaps with moisturizer when washing hands    Orders:    ammonium lactate (LAC-HYDRIN) 12 % lotion; Apply topically 2 (two) times a day as needed for dry skin    triamcinolone (KENALOG) 0.1 % cream; Apply topically 2 (two) times a day for 5 days    Anxiety  History of anxiety  Restarted on Zoloft 50 mg 5 weeks back  Tolerating it well, anxiety under control currently   no suicidal ideation  Restarted running and exercising  Plan   continue Zoloft 50 mg daily  Follow-up in 6 months   Not interested in counseling at this time            Orders:    sertraline (ZOLOFT) 25 mg tablet; Take 2 tablets (50 mg total) by mouth daily           History of Present Illness   HPI  44-year-old female here for follow-up of anxiety.  Has history of anxiety in the past and was on Zoloft which was stopped later as the anxiety was under control.  But last month due to stress at her job, her grandmother's passing she was restarted on the Zoloft 50 mg daily and she is here for follow-up.  Patient reports that she feels much better, energy levels and sleep is better after restarting the Zoloft 50 mg daily .She has restarted running and exercising after the weather got better.  She does not feel the need for counseling at this time.  She also complains of eczema in her fingers with dry patches and some skin breaks along with itching at night.  She has had  history of and eczema in the past and used low-dose steroid in the past which helped.  She reports using lots of hand  and washing her hands frequently at her job.  Review of Systems   Constitutional:  Negative for chills and fever.   HENT:  Negative for sore throat.    Eyes:  Negative for visual disturbance.   Respiratory:  Negative for cough and shortness of breath.    Cardiovascular:  Negative for chest pain and palpitations.   Gastrointestinal:  Negative for abdominal pain, constipation, diarrhea, nausea and vomiting.   Genitourinary:  Negative for frequency, hematuria and urgency.   Neurological:  Negative for headaches.       Objective   /68 (BP Location: Right arm, Patient Position: Sitting, Cuff Size: Large)   Pulse 75   Wt 101 kg (222 lb 12.8 oz)   SpO2 98%   BMI 33.88 kg/m²      Physical Exam  HENT:      Mouth/Throat:      Mouth: Mucous membranes are moist.      Pharynx: Oropharynx is clear.   Eyes:      Extraocular Movements: Extraocular movements intact.      Pupils: Pupils are equal, round, and reactive to light.   Cardiovascular:      Rate and Rhythm: Normal rate and regular rhythm.   Pulmonary:      Effort: Pulmonary effort is normal.      Breath sounds: Normal breath sounds.   Abdominal:      General: Abdomen is flat.      Palpations: Abdomen is soft.   Skin:     General: Skin is warm.      Capillary Refill: Capillary refill takes less than 2 seconds.      Comments: Dry patches on bilateral fingers , with redness   Neurological:      Mental Status: She is alert and oriented to person, place, and time. Mental status is at baseline.   Psychiatric:         Mood and Affect: Mood normal.         Behavior: Behavior normal.

## 2025-04-29 ENCOUNTER — TELEPHONE (OUTPATIENT)
Age: 45
End: 2025-04-29

## 2025-04-29 NOTE — TELEPHONE ENCOUNTER
PA for Ammonium lactate 12% lotion SUBMITTED to [facility]    via    []CMM-KEY:    [x]Surescripts-Case ID #    []Availity-Auth ID #  NDC #    []Faxed to plan   []Other website    []Phone call Case ID #       [x]PA sent as URGENT    All office notes, labs and other pertaining documents and studies sent. Clinical questions answered. Awaiting determination from insurance company.     Turnaround time for your insurance to make a decision on your Prior Authorization can take 7-21 business days.

## 2025-04-30 NOTE — TELEPHONE ENCOUNTER
PA Ammonium lactate 12% lotion Excluded    Reason:(Screenshot if applicable)        Message sent to office clinical pool Yes

## 2025-05-01 ENCOUNTER — APPOINTMENT (OUTPATIENT)
Dept: URGENT CARE | Age: 45
End: 2025-05-01
Payer: OTHER MISCELLANEOUS

## 2025-05-01 PROCEDURE — G0382 LEV 3 HOSP TYPE B ED VISIT: HCPCS

## 2025-05-01 PROCEDURE — 99283 EMERGENCY DEPT VISIT LOW MDM: CPT

## 2025-05-12 ENCOUNTER — HOSPITAL ENCOUNTER (OUTPATIENT)
Dept: RADIOLOGY | Age: 45
Discharge: HOME | End: 2025-05-12
Payer: COMMERCIAL

## 2025-05-12 VITALS — BODY MASS INDEX: 33.65 KG/M2 | HEIGHT: 68 IN | WEIGHT: 222 LBS

## 2025-05-12 DIAGNOSIS — Z12.31 VISIT FOR SCREENING MAMMOGRAM: ICD-10-CM

## 2025-05-12 PROCEDURE — 77067 SCR MAMMO BI INCL CAD: CPT

## 2025-05-12 PROCEDURE — 77063 BREAST TOMOSYNTHESIS BI: CPT

## 2025-06-11 ENCOUNTER — OFFICE VISIT (OUTPATIENT)
Dept: INTERNAL MEDICINE CLINIC | Facility: CLINIC | Age: 45
End: 2025-06-11
Payer: COMMERCIAL

## 2025-06-11 VITALS
BODY MASS INDEX: 34.37 KG/M2 | SYSTOLIC BLOOD PRESSURE: 110 MMHG | OXYGEN SATURATION: 99 % | HEIGHT: 68 IN | WEIGHT: 226.8 LBS | DIASTOLIC BLOOD PRESSURE: 64 MMHG | HEART RATE: 54 BPM

## 2025-06-11 DIAGNOSIS — M25.562 ACUTE PAIN OF LEFT KNEE: Primary | ICD-10-CM

## 2025-06-11 PROCEDURE — 99213 OFFICE O/P EST LOW 20 MIN: CPT | Performed by: INTERNAL MEDICINE

## 2025-06-11 RX ORDER — NAPROXEN 500 MG/1
500 TABLET ORAL 2 TIMES DAILY PRN
Qty: 20 TABLET | Refills: 0 | Status: SHIPPED | OUTPATIENT
Start: 2025-06-11

## 2025-06-11 NOTE — PROGRESS NOTES
"Name: Cammy Bennett      : 1980      MRN: [id number]  Encounter Provider: Benji Díaz MD  Encounter Date: 2025   Enc[facility] department: [facility]  :  Assessment & Plan  Acute pain of left knee  Pain swelling appears to be located above the left knee-and is possibly due to quadriceps tendinitis versus bursitis.  She has been given a prescription for naproxen to take twice a day as needed for pain and inflammation.  I recommend that she aggressively ice the affected area for 15 minutes at a time over the next 24 to 48 hours.  Recommended rest and leg elevation.  A referral has been made to physical therapy to help strengthen the muscles around her knee.  Orders:  •  naproxen (NAPROSYN) 500 mg tablet; Take 1 tablet (500 mg total) by mouth 2 (two) times a day as needed for mild pain (with meals)  •  Ambulatory Referral to Physical Therapy; Future           History of Present Illness   HPI  Patient presents today as an acute visit.  She reports last night she was playing soccer.  She cannot recall a specific time while playing when she injured her knee.  This morning she woke up stating the area above her knee was swollen and tender to touch.  She denies experiencing any trauma while playing soccer yesterday.  She reports over the past 2 weeks she has been making a concerted effort to be more active and she is started a new run-walk program in addition to playing soccer.  For pain she has been taking ibuprofen in addition to icing her knee.    Review of Systems  All other systems negative except for pertinent findings noted in HPI.       Objective   /64 (BP Location: Left arm, Patient Position: Sitting, Cuff Size: Large)   Pulse (!) 54   Ht 5' 8\" (1.727 m)   Wt 103 kg (226 lb 12.8 oz)   LMP 2025 (Approximate)   SpO2 99%   BMI 34.48 kg/m²      Physical Exam  General: NAD  HEENT: NCAT, EOMI, normal conjunctiva  Cardiovascular: RRR, normal S1 and S2, no " Scar revision LEFT axillary incision   m/r/g  Pulmonary: Normal respiratory effort, no wheezes, rales or rhonchi  Musculoskeletal: Localized swelling with tenderness to palpation over the left quadriceps tendon, anterior and posterior drawer sign absent, mild tenderness to palpation over left medial joint line of knee  Skin: Normal skin color, no rashes   Psychiatric: Normal mood and affect

## 2025-06-11 NOTE — PATIENT INSTRUCTIONS
- Please rest your knee over the next several days.  You may go for easy walks.  -A prescription for an anti-inflammatory called naproxen has been sent to your pharmacy.  Take it twice a day as needed with food for pain and inflammation.  -A referral has been made to physical therapy to help with strengthening the muscles and tendons surrounding your knee.  -Over the next 24 to 48 hours please aggressively ice your knee for 15 minutes at a time.

## 2025-06-17 ENCOUNTER — EVALUATION (OUTPATIENT)
Dept: PHYSICAL THERAPY | Age: 45
End: 2025-06-17
Attending: INTERNAL MEDICINE
Payer: COMMERCIAL

## 2025-06-17 DIAGNOSIS — M25.562 ACUTE PAIN OF LEFT KNEE: Primary | ICD-10-CM

## 2025-06-17 PROCEDURE — 97161 PT EVAL LOW COMPLEX 20 MIN: CPT

## 2025-06-17 PROCEDURE — 97110 THERAPEUTIC EXERCISES: CPT

## 2025-06-17 NOTE — PROGRESS NOTES
PT Evaluation     Today's date: 2025  Patient name: Cammy Bennett  : 1980  MRN: [id number]  Referring provider: Benji Díaz,*  Dx:   Encounter Diagnosis     ICD-10-CM    1. Acute pain of left knee  M25.562 Ambulatory Referral to Physical Therapy                     Assessment  Impairments: abnormal or restricted ROM, activity intolerance, impaired physical strength, lacks appropriate home exercise program and pain with function    Assessment details: Patient is a 45 year old female presenting to PT with c/c of L knee pain, onset a few weeks ago, no TESSA. Patient recently began running/walking program, and playing soccer again, and noted pain and swelling after this. Evaluation demonstrates deficits in knee flexion AROM secondary to pain, as well as some deficits in LE strength. Some swelling present around joint line. Valgus/varus and ligament testing (-). Suspect overuse type injury. Patient will benefit from skilled outpt PT to address deficits and improve abilities with all functional tasks.   Understanding of Dx/Px/POC: good     Prognosis: good    Goals  STG (4 weeks):  1) Education: Patient to demonstrate compliance with attendance of therapy sessions and performance of introductory HEP.   2) Pain: Patient to report day to day pain levels <3/10, allowing for improvements with various functional tasks including squatting, kneeling, caring for children.     LTG (8 weeks):   1) Education: Patient to demonstrate compliance with attendance of therapy sessions and performance of progressive HEP, allowing for transition to self-management of symptoms.  2) Pain: Patient to report <1/10 pain during her day to day activities, allowing for improvements with various functional tasks including squatting, kneeling, caring for children.   3) FOTO: Patient to score >/= expected FOTO score, signifying improvements in functional abilities.   4) ROM: Patient to demonstrate pain-free full L knee ROM equal to  that of contralateral LE, allowing for improvements with squatting/kneeling.   5) Strength: Patient to demonstrate 5/5 L LE strength, allowing for improvements with all functional tasks.     Plan  Patient would benefit from: skilled physical therapy  Planned modality interventions: cryotherapy, electrical stimulation/Russian stimulation, TENS, thermotherapy: hydrocollator packs, low level laser therapy and high voltage pulsed current: pain management    Planned therapy interventions: abdominal trunk stabilization, IASTM, joint mobilization, manual therapy, kinesiology taping, activity modification, neuromuscular re-education, balance/weight bearing training, balance, patient/caregiver education, flexibility, functional ROM exercises, home exercise program, therapeutic exercise, therapeutic activities, stretching, strengthening and gait training    Frequency: 1x week  Duration in weeks: 8  Plan of Care beginning date: 2025  Plan of Care expiration date: 8/15/2025  Treatment plan discussed with: patient and PTA        Subjective Evaluation    History of Present Illness  Mechanism of injury: Patient presents to PT for evaluation with c/c of L knee pain, acute in nature, onset a couple weeks ago, no TESSA. Patient began exercising around this time, walking and jogging routine, as well as playing soccer. Day after soccer, had pain and swelling knee. No TESSA during soccer. Patient reporting difficulties now with knee flexion tasks, including kneeling, squatting, and caring for her 3 children. No major pain at rest, and no numbness/tingling, no buckling or clicking or locking up. Not using any pain medication.   Patient Goals  Patient goals for therapy: decreased pain          Objective     Tenderness     Additional Tenderness Details  Lateral quad tendon superolateral patella    Active Range of Motion   Left Knee   Flexion: 110 degrees with pain  Extensor la degrees     Mobility   Patellar Mobility:   Left Knee    WFL: medial, lateral, superior and inferior.     Strength/Myotome Testing     Left Hip   Planes of Motion   Flexion: 4+  Abduction: 4    Left Knee   Flexion: 4  Extension: 4    Tests     Left Knee   Negative anterior drawer, lateral Esther, medial Esther, valgus stress test at 30 degrees and varus stress test at 30 degrees.     General Comments:      Knee Comments  Deficits in quad mm flexibility              Precautions: L knee      Manuals 06/17                                                                Neuro Re-Ed                                                                                                        Ther Ex             Recumbent Bike 7'            HEP (SLR, S/L hip abd, LAQ) NB                                                                                          Ther Activity                                       Gait Training                                       Modalities

## 2025-06-17 NOTE — HOME EXERCISE EDUCATION
Program_ID:[id number]   Access Code: [email or web address]  URL: [email or web address]  Date: 06-  Prepared By: Santhosh Solomon    Program Notes      Exercises      - Seated Long Arc Quad - 2 x daily - 5 x weekly - 2 sets - 10 reps - 5s hold      - Supine Active Straight Leg Raise - 2 x daily - 5 x weekly - 2 sets - 10 reps      - Sidelying Hip Abduction - 2 x daily - 5 x weekly - 2 sets - 10 reps      - Recumbent Bike - 1 x daily - 5 x weekly -  sets -  reps - 10min hold

## 2025-06-24 ENCOUNTER — OFFICE VISIT (OUTPATIENT)
Dept: PHYSICAL THERAPY | Age: 45
End: 2025-06-24
Attending: INTERNAL MEDICINE
Payer: COMMERCIAL

## 2025-06-24 DIAGNOSIS — M25.562 ACUTE PAIN OF LEFT KNEE: Primary | ICD-10-CM

## 2025-06-24 PROCEDURE — 97110 THERAPEUTIC EXERCISES: CPT | Performed by: SPECIALIST/TECHNOLOGIST

## 2025-06-24 PROCEDURE — 97530 THERAPEUTIC ACTIVITIES: CPT | Performed by: SPECIALIST/TECHNOLOGIST

## 2025-06-24 PROCEDURE — 97140 MANUAL THERAPY 1/> REGIONS: CPT | Performed by: SPECIALIST/TECHNOLOGIST

## 2025-06-24 NOTE — PROGRESS NOTES
Daily Note     Today's date: 2025  Patient name: Cammy Bennett  : 1980  MRN: [id number]  Referring provider: Benji Díaz,*  Dx:   Encounter Diagnosis     ICD-10-CM    1. Acute pain of left knee  M25.562                      Subjective: Pt reprots slight improvement in symptoms since IE, swelling persists, lateral motions remain bothersome.       Objective: See treatment diary below      Assessment: Joint effusion present. Decreased ROM into flexion, pt would benefit from continued manual interventions. PRE's targeting quads this visit primarily.  Tolerated treatment well. Patient demonstrated fatigue post treatment, exhibited good technique with therapeutic exercises, and would benefit from continued PT      Plan: Continue per plan of care.  Progress treatment as tolerated.       Precautions: L knee      Manuals            Prone Quad Stretch  KM           PROM Ext  KM                                     Neuro Re-Ed                                                                                                        Ther Ex             Recumbent Bike 7' 10'           HEP (SLR, S/L hip abd, LAQ) NB                         SAQ  5# 30x           LAQ  5# 30x           T[facility] [employer]  20# 30x                                     Ther Activity             Sled Pulls  25# 3x50ft pulls           Disc Slides Lateral/ Posterior  15x ea           Gait Training                                       Modalities

## 2025-07-01 ENCOUNTER — OFFICE VISIT (OUTPATIENT)
Dept: PHYSICAL THERAPY | Age: 45
End: 2025-07-01
Attending: INTERNAL MEDICINE
Payer: COMMERCIAL

## 2025-07-01 DIAGNOSIS — M25.562 ACUTE PAIN OF LEFT KNEE: Primary | ICD-10-CM

## 2025-07-01 PROCEDURE — 97530 THERAPEUTIC ACTIVITIES: CPT

## 2025-07-01 PROCEDURE — 97110 THERAPEUTIC EXERCISES: CPT

## 2025-07-01 NOTE — PROGRESS NOTES
Daily Note     Today's date: 2025  Patient name: Cammy Bennett  : 1980  MRN: [id number]  Referring provider: Benji Díaz,*  Dx:   Encounter Diagnosis     ICD-10-CM    1. Acute pain of left knee  M25.562                      Subjective: Patient presents to PT reporting some improvements in symptoms since last seen. Less swelling, less pain, and was fairly active with walking and being on feet this past week.       Objective: See treatment diary below      Assessment: Tolerated treatment fair. Patient would benefit from continued PT. Interventions continued today to improve quad strength, prox hip strength, and mm flexibility. Patient tolerates interventions well. Does appear to have decreased swelling, and improved AROM today. Progressed PREs today, which patient tolerated well without exacerbation. Will continue to progress patient as able to tolerate to improve abilities with all functional tasks.       Plan: Progress treatment as tolerated.       Precautions: L knee      Manuals           Prone Quad Stretch  KM NB          PROM Ext  KM                                     Neuro Re-Ed                                                                                                        Ther Ex             Recumbent Bike 7' 10' 10'          HEP (SLR, S/L hip abd, LAQ) NB                         SAQ  5# 30x 5#  3 x 10          LAQ  5# 30x 5#  3 x 10          TKE [place]  20# 30x Not today          Leg press S/L   3 x 10  45#          Hip abduction cybex   3 x 10  40#          Ther Activity             Sled Pulls  25# 3x50ft pulls 40#  5 x 50ft push/pull          Disc Slides Lateral/ Posterior  15x ea 10x ea          Gait Training                                       Modalities

## 2025-07-08 ENCOUNTER — OFFICE VISIT (OUTPATIENT)
Dept: PHYSICAL THERAPY | Age: 45
End: 2025-07-08
Attending: INTERNAL MEDICINE
Payer: COMMERCIAL

## 2025-07-08 DIAGNOSIS — M25.562 ACUTE PAIN OF LEFT KNEE: Primary | ICD-10-CM

## 2025-07-08 PROCEDURE — 97110 THERAPEUTIC EXERCISES: CPT

## 2025-07-08 PROCEDURE — 97530 THERAPEUTIC ACTIVITIES: CPT

## 2025-07-08 NOTE — PROGRESS NOTES
Daily Note     Today's date: 2025  Patient name: Cammy Bennett  : 1980  MRN: [id number]  Referring provider: Benji Díaz,*  Dx:   Encounter Diagnosis     ICD-10-CM    1. Acute pain of left knee  M25.562                      Subjective: Knee felt good after last treatment session. Went for a walk and jogged for a short period of time during this - felt good in the moment with no pain or clicking/catching, but had swelling later that night. Swelling has since calmed down.       Objective: See treatment diary below      Assessment: Tolerated treatment well. Patient would benefit from continued PT. Interventions continued to address deficits in knee ROM and LE strength. Tolerates interventions well. Patellar crepitus noted during SAQ. Patient noted less discomfort during quad stretch compared to previously. Able to increase load with various interventions today and progress patient with good challenge. Patient will continue to benefit from skilled outpt PT to address deficits and improve abilities with all functional tasks.       Plan: Progress treatment as tolerated.       Precautions: L knee      Manuals          Prone Quad Stretch  KM NB NB         PROM Ext  KM                                     Neuro Re-Ed             Bosu step ups FWD/LAT    20x ea                                                                                       Ther Ex             Recumbent Bike 7' 10' 10' 10'         HEP (SLR, S/L hip abd, LAQ) NB                         SAQ  5# 30x 5#  3 x 10 5#  3 x 10         LAQ  5# 30x 5#  3 x 10 5#  3 x 10         TKE Dillan  20# 30x Not today          Leg press S/L   3 x 10  45# 3 x 10  50#  B/L         Hip abduction cybex   3 x 10  40# 3 x 10   45#         [place]illan walks BKWD/SS    10x ea  25#, 20#         Ther Activity             Sled Pulls  25# 3x50ft pulls 40#  5 x 50ft push/pull 40#  5 x 50ft         Disc Slides Lateral/ Posterior  15x ea 10x ea 10x ea          Gait Training                                       Modalities

## 2025-07-15 ENCOUNTER — OFFICE VISIT (OUTPATIENT)
Dept: PHYSICAL THERAPY | Age: 45
End: 2025-07-15
Attending: INTERNAL MEDICINE
Payer: COMMERCIAL

## 2025-07-15 DIAGNOSIS — M25.562 ACUTE PAIN OF LEFT KNEE: Primary | ICD-10-CM

## 2025-07-15 PROCEDURE — 97110 THERAPEUTIC EXERCISES: CPT | Performed by: SPECIALIST/TECHNOLOGIST

## 2025-07-15 PROCEDURE — 97140 MANUAL THERAPY 1/> REGIONS: CPT | Performed by: SPECIALIST/TECHNOLOGIST

## 2025-07-15 PROCEDURE — 97530 THERAPEUTIC ACTIVITIES: CPT | Performed by: SPECIALIST/TECHNOLOGIST

## 2025-08-06 ENCOUNTER — OFFICE VISIT (OUTPATIENT)
Dept: PHYSICAL THERAPY | Age: 45
End: 2025-08-06
Attending: INTERNAL MEDICINE
Payer: COMMERCIAL

## 2025-08-06 DIAGNOSIS — M25.562 ACUTE PAIN OF LEFT KNEE: Primary | ICD-10-CM

## 2025-08-06 PROCEDURE — 97110 THERAPEUTIC EXERCISES: CPT

## 2025-08-13 ENCOUNTER — OFFICE VISIT (OUTPATIENT)
Dept: PHYSICAL THERAPY | Age: 45
End: 2025-08-13
Attending: INTERNAL MEDICINE
Payer: COMMERCIAL